# Patient Record
Sex: FEMALE | Race: WHITE | NOT HISPANIC OR LATINO | Employment: STUDENT | URBAN - METROPOLITAN AREA
[De-identification: names, ages, dates, MRNs, and addresses within clinical notes are randomized per-mention and may not be internally consistent; named-entity substitution may affect disease eponyms.]

---

## 2017-11-28 ENCOUNTER — OFFICE VISIT (OUTPATIENT)
Dept: FAMILY MEDICINE CLINIC | Facility: CLINIC | Age: 42
End: 2017-11-28

## 2017-11-28 VITALS
BODY MASS INDEX: 23.99 KG/M2 | WEIGHT: 162 LBS | RESPIRATION RATE: 18 BRPM | HEART RATE: 73 BPM | HEIGHT: 69 IN | OXYGEN SATURATION: 99 % | SYSTOLIC BLOOD PRESSURE: 98 MMHG | DIASTOLIC BLOOD PRESSURE: 64 MMHG

## 2017-11-28 DIAGNOSIS — J06.9 ACUTE URI: Primary | ICD-10-CM

## 2017-11-28 DIAGNOSIS — J01.10 ACUTE NON-RECURRENT FRONTAL SINUSITIS: ICD-10-CM

## 2017-11-28 PROCEDURE — 99213 OFFICE O/P EST LOW 20 MIN: CPT | Performed by: NURSE PRACTITIONER

## 2017-11-28 RX ORDER — AZITHROMYCIN 250 MG/1
TABLET, FILM COATED ORAL
Qty: 6 TABLET | Refills: 0 | Status: SHIPPED | OUTPATIENT
Start: 2017-11-28 | End: 2018-08-28

## 2017-11-28 NOTE — PROGRESS NOTES
"Subjective   Lucille Reynaga is a 41 y.o. female.     Chief Complaint   Patient presents with   • Sinusitis     x 6 days, sinus drainage, head pain, cough, fatigued     Social History   Substance Use Topics   • Smoking status: Never Smoker   • Smokeless tobacco: Not on file   • Alcohol use Not on file       URI    This is a new problem. The current episode started in the past 7 days (6 days). The problem has been unchanged. Associated symptoms include congestion, coughing, ear pain (right), headaches and sinus pain. She has tried antihistamine (neti pot) for the symptoms. The treatment provided mild relief.     Review of Systems   Constitutional: Positive for chills and fatigue. Negative for fever.   HENT: Positive for congestion, ear pain (right) and sinus pain.    Respiratory: Positive for cough.    Neurological: Positive for headaches.   All other systems reviewed and are negative.    Physical Exam   Gen: mildly ill appearing, alert  Ears: Tm's bulging without redness  Nose:  Congestion  Throat:  Red without exudate, some drainage, tonsils okay  Neck: no LAD  Lung: good air movement, regular RR  Heart: RR without murmur  Skin: no rash.    The following portions of the patient's history were reviewed and updated as appropriate: allergies, current medications,past medical hx, family hx, past social history an...    Chief Complaint   Patient presents with   • Sinusitis     x 6 days, sinus drainage, head pain, cough, fatigued       Vitals:    11/28/17 1409   BP: 98/64   BP Location: Left arm   Patient Position: Sitting   Cuff Size: Adult   Pulse: 73   Resp: 18   SpO2: 99%   Weight: 162 lb (73.5 kg)   Height: 69\" (175.3 cm)       Assessment/Plan   Problems Addressed this Visit        Respiratory    Acute URI - Primary    Relevant Medications    azithromycin (ZITHROMAX) 250 MG tablet      Other Visit Diagnoses     Acute non-recurrent frontal sinusitis        Relevant Medications    azithromycin (ZITHROMAX) 250 MG " tablet               Tylenol or Advil as needed for pain, fever, muscle aches  Plenty of fluids  Hand washing discussed  Off work or school note given if needed.  Warm tea for throat.      Cecilia JI  Family Practice  [place], [place]  [facility]

## 2019-11-18 ENCOUNTER — OFFICE VISIT (OUTPATIENT)
Dept: FAMILY MEDICINE CLINIC | Age: 44
End: 2019-11-18
Payer: COMMERCIAL

## 2019-11-18 VITALS
OXYGEN SATURATION: 98 % | SYSTOLIC BLOOD PRESSURE: 110 MMHG | WEIGHT: 200 LBS | HEART RATE: 74 BPM | BODY MASS INDEX: 30.31 KG/M2 | HEIGHT: 68 IN | DIASTOLIC BLOOD PRESSURE: 80 MMHG

## 2019-11-18 DIAGNOSIS — E28.2 PCOS (POLYCYSTIC OVARIAN SYNDROME): Primary | ICD-10-CM

## 2019-11-18 DIAGNOSIS — F31.9 BIPOLAR 1 DISORDER (HCC): ICD-10-CM

## 2019-11-18 DIAGNOSIS — N94.3 PMS (PREMENSTRUAL SYNDROME): ICD-10-CM

## 2019-11-18 DIAGNOSIS — N94.6 DYSMENORRHEA: ICD-10-CM

## 2019-11-18 PROCEDURE — 99205 OFFICE O/P NEW HI 60 MIN: CPT | Performed by: FAMILY MEDICINE

## 2019-11-18 RX ORDER — LITHIUM CARBONATE 600 MG/1
600 CAPSULE ORAL DAILY
COMMUNITY

## 2019-11-18 RX ORDER — METFORMIN HYDROCHLORIDE 500 MG/1
1500 TABLET, EXTENDED RELEASE ORAL
Qty: 90 TABLET | Refills: 5 | Status: SHIPPED | OUTPATIENT
Start: 2019-11-18 | End: 2020-04-06

## 2019-11-18 RX ORDER — ESCITALOPRAM OXALATE 20 MG/1
20 TABLET ORAL DAILY
COMMUNITY

## 2019-11-18 SDOH — HEALTH STABILITY: MENTAL HEALTH: HOW OFTEN DO YOU HAVE A DRINK CONTAINING ALCOHOL?: MONTHLY OR LESS

## 2019-11-18 SDOH — HEALTH STABILITY: MENTAL HEALTH: HOW MANY STANDARD DRINKS CONTAINING ALCOHOL DO YOU HAVE ON A TYPICAL DAY?: 1 OR 2

## 2019-11-18 ASSESSMENT — PATIENT HEALTH QUESTIONNAIRE - PHQ9
1. LITTLE INTEREST OR PLEASURE IN DOING THINGS: 0
SUM OF ALL RESPONSES TO PHQ9 QUESTIONS 1 & 2: 0
SUM OF ALL RESPONSES TO PHQ QUESTIONS 1-9: 0
SUM OF ALL RESPONSES TO PHQ QUESTIONS 1-9: 0
2. FEELING DOWN, DEPRESSED OR HOPELESS: 0

## 2019-11-18 ASSESSMENT — ENCOUNTER SYMPTOMS
EYES NEGATIVE: 1
RESPIRATORY NEGATIVE: 1
ALLERGIC/IMMUNOLOGIC NEGATIVE: 1
GASTROINTESTINAL NEGATIVE: 1

## 2020-04-06 ENCOUNTER — TELEMEDICINE (OUTPATIENT)
Dept: FAMILY MEDICINE CLINIC | Age: 45
End: 2020-04-06
Payer: COMMERCIAL

## 2020-04-06 PROBLEM — E28.2 PCOS (POLYCYSTIC OVARIAN SYNDROME): Status: ACTIVE | Noted: 2020-04-06

## 2020-04-06 PROBLEM — E28.8 OTHER OVARIAN DYSFUNCTION: Status: ACTIVE | Noted: 2020-04-06

## 2020-04-06 PROBLEM — N97.9 FEMALE INFERTILITY: Status: ACTIVE | Noted: 2020-04-06

## 2020-04-06 PROCEDURE — 99214 OFFICE O/P EST MOD 30 MIN: CPT | Performed by: FAMILY MEDICINE

## 2020-04-06 RX ORDER — LETROZOLE 2.5 MG/1
TABLET, FILM COATED ORAL
Qty: 8 TABLET | Refills: 5 | Status: SHIPPED | OUTPATIENT
Start: 2020-04-06 | End: 2020-09-23

## 2020-04-06 NOTE — PROGRESS NOTES
20 mg by mouth daily       No current facility-administered medications for this visit. There is no immunization history on file for this patient. Social History     Tobacco Use    Smoking status: Never Smoker    Smokeless tobacco: Never Used   Substance Use Topics    Alcohol use: Yes     Alcohol/week: 1.0 standard drinks     Types: 1 Glasses of wine per week     Frequency: Monthly or less     Drinks per session: 1 or 2    Drug use: Never        Review of Systems no new problems. Objective:   Physical Exam  Constitutional:       General: She is not in acute distress. Appearance: Normal appearance. She is not ill-appearing. Pulmonary:      Effort: Pulmonary effort is normal.   Skin:     General: Skin is warm. Neurological:      General: No focal deficit present. Mental Status: She is alert and oriented to person, place, and time. Mental status is at baseline. Psychiatric:         Mood and Affect: Mood normal.         Behavior: Behavior normal.         Thought Content: Thought content normal.         Judgment: Judgment normal.         Assessment:      1. PCOS (polycystic ovarian syndrome)  - no direct therapy presently. Cycles under 35 days most of the time. Could add OTC supplements to her efforts to eat healthy and be active, if she wishes. Did not discuss that at length this visit. 2. Other ovarian dysfunction- luteal progesterone defect 2/20  - new diagnosis for her. discussed [employer] treatment for this- luteal Prometrium 200mg peak plus 3-12 each cycle, starting this current cycle. Start femara 20 mg day 2 after neg upt. discussed mechanism of this drug for aiding ovulation  Monitor peak plus 7 progesterone and estradiol levels each cycle, starting NEXT cycle. 3. Female infertility (TTC since 12/18)  - unable to do SFA and HSG now anyway due to [facility] [employer] closures.   Will provide orders for these if not pregnant with medical therapy as above by the time of her next visit after 3 cycles. Plan:      F/u after 3 femara cycles. I spent a total of 25 minutes face to face with the patient and greater than 50% of the time was spent in discussing disease pathophysiology, motivational counseling, answering questions, addressing concerns, reviewing labs and/or other studies with the patient as well as coordinating care.             Osman Ma MD

## 2020-09-23 ENCOUNTER — TELEMEDICINE (OUTPATIENT)
Dept: FAMILY MEDICINE CLINIC | Age: 45
End: 2020-09-23
Payer: COMMERCIAL

## 2020-09-23 PROCEDURE — 99214 OFFICE O/P EST MOD 30 MIN: CPT | Performed by: FAMILY MEDICINE

## 2020-09-23 RX ORDER — LETROZOLE 2.5 MG/1
TABLET, FILM COATED ORAL
Qty: 10 TABLET | Refills: 5 | Status: SHIPPED | OUTPATIENT
Start: 2020-09-23

## 2020-09-23 RX ORDER — METFORMIN HYDROCHLORIDE 500 MG/1
1500 TABLET, EXTENDED RELEASE ORAL
Qty: 90 TABLET | Refills: 5 | Status: SHIPPED | OUTPATIENT
Start: 2020-09-23

## 2020-09-23 NOTE — PROGRESS NOTES
2020    TELEHEALTH EVALUATION -- Audio/Visual (During [facility]-66 public health emergency)    HPI:    Carlie Fleming (:  1975) has requested an audio/video evaluation for the following concern(s):    Chief Complaint   Patient presents with    Other     fertility check      virtual video visit with Tani Hylton and George Maddox for PCOS and luteal dysfunction. Seeking pregnancy since . Last visit . Still charting Spivey method. I do not have a picture of it to review. PCOS: having menstrual cycles regularly, 26-32 days. No meds for this. Adhering to lower carb diet, walking daily. Is seeing functional nutritionist, has eliminated gluten and dairy from diet. Current treatment: Femara 20 mg day 2, progesterone 200 mg peak plus 3-12. Last peak plus 7 done at [address] [place] [address] in [place] have been very good:   Component      Latest Ref Rng & Units 8/10/2020 8/10/2020 2020 2020           2:15 PM  2:15 PM  2:34 PM  2:34 PM   Progesterone      ng/mL  21.7  23.0   Estradiol, Sensitive      pg/mL 156  156        She does feels less dysmenorrhea since starting luteal progesterone therapy. She has been on 500 mg of B6 for mucus, but stopped this at the recommendation of her [address] [place] [address] doctor out of concern for peripheral neuropathy. Mucus has decreased since stopping it. Last cycle only had 3 days of mucus, so that is very limited. She did not feel any symptoms of neuropathy (no numbness, tingling or weakness)    She and her  have not yet completed SFA/HSG. Needs orders for these. She is seeing functional medicine specialist for fatigue, allergies  Seeing neurologist for chronic migraines.     Review of Systems As above     Patient Active Problem List   Diagnosis    Bipolar 1 disorder ([facility] [address] - on Lithium; Dr Derrick Hanna PCOS (polycystic ovarian syndrome)    Other ovarian dysfunction- luteal progesterone defect     Female infertility (TTC since )        Prior to pregnant after 6-9 months   - add B6 500 mg back to her regimen for mucus. Return in about 3 months (around 12/23/2020) for follow up fertility. Rebeca Rausch is a 38 y.o. female being evaluated by a Virtual Visit (video visit) encounter to address concerns as mentioned above. A caregiver was present when appropriate. Due to this being a TeleHealth encounter (During [facility] public health emergency), evaluation of the following organ systems was limited: Vitals/Constitutional/EENT/Resp/CV/GI//MS/Neuro/Skin/Heme-Lymph-Imm. Pursuant to the emergency declaration under the [address] [place] [address], [address] [place] [address] authority and the [employer] and [employer] Act, this Virtual Visit was conducted with patient's (and/or legal guardian's) consent, to reduce the patient's risk of exposure to COVID-19 and provide necessary medical care. The patient (and/or legal guardian) has also been advised to contact this office for worsening conditions or problems, and seek emergency medical treatment and/or call 911 if deemed necessary. Patient identification was verified at the start of the visit: Yes    Total time spent on this encounter: 21 or more minutes were spent on the digital evaluation and management of this patient. Services were provided through a video synchronous discussion virtually to substitute for in-person clinic visit. Patient and provider were located at their individual homes. --Cathey Eisenmenger, MD on 9/23/2020 at 11:35 AM    An electronic signature was used to authenticate this note.

## 2020-11-30 ENCOUNTER — OFFICE VISIT (OUTPATIENT)
Dept: ORTHOPEDIC SURGERY | Facility: CLINIC | Age: 45
End: 2020-11-30

## 2020-11-30 VITALS — HEART RATE: 82 BPM | BODY MASS INDEX: 30.31 KG/M2 | OXYGEN SATURATION: 98 % | HEIGHT: 68 IN | WEIGHT: 199.96 LBS

## 2020-11-30 DIAGNOSIS — M25.572 ACUTE LEFT ANKLE PAIN: Primary | ICD-10-CM

## 2020-11-30 DIAGNOSIS — S82.65XA CLOSED NONDISPLACED FRACTURE OF LATERAL MALLEOLUS OF LEFT FIBULA, INITIAL ENCOUNTER: ICD-10-CM

## 2020-11-30 PROCEDURE — 27786 TREATMENT OF ANKLE FRACTURE: CPT | Performed by: PHYSICIAN ASSISTANT

## 2020-11-30 PROCEDURE — 99214 OFFICE O/P EST MOD 30 MIN: CPT | Performed by: PHYSICIAN ASSISTANT

## 2020-11-30 NOTE — PROGRESS NOTES
[facility] Note    Subjective     Chief Complaint   Patient presents with   • Left Ankle - Pain     Twisted while loading car 11/28/2020   DOI: 11/28/2020    HPI  Lucille Mejía is a 44 y.o. female.  Patient presents for evaluation of her left ankle.  Yesterday when she was loading her car she had a twisting injury that resulted in significant pain and swelling to the lateral aspect of the ankle.  She noted a pop when she had the injury.  She had difficulty with weightbearing.  She was seen in the urgent care yesterday and diagnosed with a distal fibula fracture.  She was placed in a short boot and referred to orthopedics for further evaluation and treatment.    Currently she endorses a pain scale of 1-2/10 at rest and 9-10/10 when weightbearing.  Severity the pain mild to severe depending on activity.  Quality pain stabbing, shooting.  All pain is localized lateral aspect of the ankle.  Pain worse with walking or standing.  Patient denies any numbness or tingling into the extremity.      Patient does report history of significant ankle sprain when younger.    No reported fever, chills, night sweats or other constitutional symptoms.    Past Medical History:   Diagnosis Date   • Bipolar 1 disorder (CMS/HCC)    • Depression       Past Surgical History:   Procedure Laterality Date   • FOOT SURGERY     • LIPOMA EXCISION        History reviewed. No pertinent family history.  Social History     Socioeconomic History   • Marital status: Single     Spouse name: Not on file   • Number of children: Not on file   • Years of education: Not on file   • Highest education level: Not on file   Tobacco Use   • Smoking status: Never Smoker   • Smokeless tobacco: Never Used   Substance and Sexual Activity   • Alcohol use: No   • Drug use: Defer   • Sexual activity: Defer      Current Outpatient Medications on File Prior to Visit   Medication Sig Dispense Refill   • Ascorbic Acid (VITAMIN C) 500 MG capsule  "Take  by mouth.     • Cholecalciferol (VITAMIN D3) 5000 UNIT/ML liquid Take  by mouth.     • escitalopram (LEXAPRO) 20 MG tablet Take  by mouth.     • letrozole (FEMARA) 2.5 MG tablet Take  by mouth Daily.     • lithium carbonate 300 MG capsule Take 300 mg by mouth 3 (Three) Times a Day With Meals.     • Progesterone 100 MG suppository Insert  into the vagina.     • Pyrantel Pamoate 144 (50 Base) MG/ML suspension Take 20 mL by mouth and repeat in two weeks. 40 mL 0     No current facility-administered medications on file prior to visit.       Allergies   Allergen Reactions   • Amoxicillin-Pot Clavulanate    • Dexamethasone    • Sulfa Antibiotics         The following portions of the patient's history were reviewed and updated as appropriate: allergies, current medications, past family history, past medical history, past social history, past surgical history and problem list.    Review of Systems   Constitutional: Negative.    HENT: Negative.    Eyes: Negative.    Respiratory: Negative.    Cardiovascular: Negative.    Gastrointestinal: Negative.    Endocrine: Negative.    Genitourinary: Negative.    Musculoskeletal: Positive for arthralgias.   Skin: Negative.    Allergic/Immunologic: Negative.    Neurological: Negative.    Hematological: Negative.    Psychiatric/Behavioral: Negative.         Objective      Physical Exam  Pulse 82   Ht 171.5 cm (67.52\")   Wt 90.7 kg (199 lb 15.3 oz)   LMP 11/15/2020   SpO2 98%   BMI 30.84 kg/m²     Body mass index is 30.84 kg/m².    GENERAL APPEARANCE: awake, alert & oriented x 3, in no acute distress and well developed, well nourished  PSYCH: normal mood and affect  LUNGS:  breathing nonlabored, no wheezing  EYES: sclera anicteric, pupils equal  CARDIOVASCULAR: palpable pulses. Capillary refill less than 2 seconds  INTEGUMENTARY: skin intact, no clubbing, cyanosis  NEUROLOGIC:  Normal Sensation         Ortho Exam  Peripheral Vascular:  Lower Extremity:  Inspection:  Left--rapid " capillary refill  Palpation:  Dorsalis pedis pulse:  Left--normal    Neurologic  Sensory:    Light Touch:     Left foot:  Dorsal intact and plantar intact    Overall Assessment of Muscle Strength and Tone:  Lower Extremities:       Left:  Tibialis anterior--5/5    Gastroc soleus--5/5    EHL--5/5    FHL--5/5    Musculoskeletal  Lower Extremity  Ankle/Foot:  Inspection and Palpation:     Left:  Tenderness: Distal fibula, lateral ankle.  Negative to medial ankle.    Swelling: Lateral ankle    Effusion:  None    Crepitus:  None     ROM: All ranges of motion causes exacerbation of pain   Left: Plantarflexion--30    Dorsiflexion--10    Inversion--5    Eversion--5    Instability:     Left: Anterior drawer test--negative laxity but positive pain    Squeeze test--negative    Unable to heel or toe walk secondary to pain.      Imaging/Studies  Dr. Banegas and I reviewed plain film imaging performed on 11/29/2020.    EXAMINATION: XR ANKLE 3+ VW LEFT-      INDICATION: twisted left ankle yesterday, swollen and unable to bear  weight.  tender lateral mallelous.      COMPARISON: NONE     FINDINGS: There is cortical irregularity of the distal fibula below the  level of the ankle mortise, with the appearance of fracture. There is  significant overlying soft tissue edema. No prior comparison is  available and fracture fragments appear somewhat sclerotic. Ankle  mortise is symmetric otherwise with an intact talar dome. No additional  acute fracture or dislocation is identified.     IMPRESSION:  Probable acute fracture of the distal fibula below the ankle  mortise with considerable overlying soft tissue edema.     This report was finalized on 11/29/2020 4:31 PM by Sb Storm.    Assessment/Plan        ICD-10-CM ICD-9-CM   1. Acute left ankle pain  M25.572 719.47   2. Closed nondisplaced fracture of lateral malleolus of left fibula, initial encounter  S82.65XA 824.2       No orders of the defined types were placed in this  encounter.       -Left ankle pain due to a nondisplaced fracture distal fibula/lateral malleolus.  -Patient to continue wearing short boot.  -Recommend crutches to assist with ambulation.  Patient reports she has crutches but she is also ordered a knee scooter.  At this time she is weightbearing as tolerated with the boot.  -Discussed the importance of ice and elevation to help with swelling control.  -Recommend Tylenol/acetaminophen and ibuprofen as needed for pain and swelling control.  -Also recommended compression socks to help assist with inflammation/swelling control.  Patient was provided a handout on where she could purchase these.  -Follow-up 4 weeks for repeat evaluation, sooner if issues arise or symptoms worsen/change.  Patient will require repeat imaging of her left ankle at that appointment.  Anticipate being able to transition to [employer] [facility] for lace up ankle brace at that appointment.  Also anticipate possible referral to PT if needed.  -Questions and concerns answered.    History, exam and imaging all discussed Dr. Banegas who agrees with the above assessment and plan.      Medical Decision Making  Management Options : over-the-counter medicine and close treatment of fracture or dislocation  Data/Risk: radiology tests    Nolvia Song PA-C  11/30/20  16:21 EST         EMR Dragon/Transcription disclaimer:  Much of this encounter note is an electronic transcription of spoken language to printed text. Electronic transcription of spoken language may permit erroneous, or at times, nonsensical words or phrases to be inadvertently transcribed. Although I have reviewed the note for such errors, some may still exist.

## 2020-12-10 ENCOUNTER — HOSPITAL ENCOUNTER (EMERGENCY)
Facility: HOSPITAL | Age: 45
Discharge: HOME | End: 2020-12-10
Attending: EMERGENCY MEDICINE | Admitting: EMERGENCY MEDICINE

## 2020-12-10 VITALS
TEMPERATURE: 98.6 F | HEIGHT: 67 IN | OXYGEN SATURATION: 97 % | RESPIRATION RATE: 16 BRPM | DIASTOLIC BLOOD PRESSURE: 77 MMHG | WEIGHT: 200 LBS | HEART RATE: 78 BPM | SYSTOLIC BLOOD PRESSURE: 107 MMHG | BODY MASS INDEX: 31.39 KG/M2

## 2020-12-10 DIAGNOSIS — T78.40XA ALLERGIC REACTION, INITIAL ENCOUNTER: Primary | ICD-10-CM

## 2020-12-10 PROCEDURE — 25010000002 DIPHENHYDRAMINE PER 50 MG: Performed by: EMERGENCY MEDICINE

## 2020-12-10 PROCEDURE — 99284 EMERGENCY DEPT VISIT MOD MDM: CPT

## 2020-12-10 PROCEDURE — 96374 THER/PROPH/DIAG INJ IV PUSH: CPT

## 2020-12-10 PROCEDURE — 99283 EMERGENCY DEPT VISIT LOW MDM: CPT

## 2020-12-10 PROCEDURE — 96375 TX/PRO/DX INJ NEW DRUG ADDON: CPT

## 2020-12-10 PROCEDURE — 63710000001 DIPHENHYDRAMINE PER 50 MG: Performed by: EMERGENCY MEDICINE

## 2020-12-10 RX ORDER — EPINEPHRINE 0.3 MG/.3ML
0.3 INJECTION SUBCUTANEOUS ONCE
Qty: 1 EACH | Refills: 0 | Status: SHIPPED | OUTPATIENT
Start: 2020-12-10 | End: 2020-12-11

## 2020-12-10 RX ORDER — FAMOTIDINE 10 MG/ML
20 INJECTION, SOLUTION INTRAVENOUS ONCE
Status: COMPLETED | OUTPATIENT
Start: 2020-12-10 | End: 2020-12-10

## 2020-12-10 RX ORDER — DIPHENHYDRAMINE HCL 25 MG
25 CAPSULE ORAL ONCE
Status: COMPLETED | OUTPATIENT
Start: 2020-12-10 | End: 2020-12-10

## 2020-12-10 RX ORDER — SODIUM CHLORIDE 0.9 % (FLUSH) 0.9 %
10 SYRINGE (ML) INJECTION AS NEEDED
Status: DISCONTINUED | OUTPATIENT
Start: 2020-12-10 | End: 2020-12-10 | Stop reason: HOSPADM

## 2020-12-10 RX ORDER — DIPHENHYDRAMINE HYDROCHLORIDE 50 MG/ML
25 INJECTION INTRAMUSCULAR; INTRAVENOUS ONCE
Status: COMPLETED | OUTPATIENT
Start: 2020-12-10 | End: 2020-12-10

## 2020-12-10 RX ADMIN — DIPHENHYDRAMINE HYDROCHLORIDE 25 MG: 50 INJECTION INTRAMUSCULAR; INTRAVENOUS at 05:49

## 2020-12-10 RX ADMIN — FAMOTIDINE 20 MG: 10 INJECTION INTRAVENOUS at 05:51

## 2020-12-10 RX ADMIN — DIPHENHYDRAMINE HYDROCHLORIDE 25 MG: 25 CAPSULE ORAL at 06:49

## 2020-12-10 NOTE — ED PROVIDER NOTES
Subjective   44-year-old female with a history of mental illness presents for evaluation of sensation of tongue swelling.  The patient states that she recently ordered prepackaged delivered food off the Internet.  She ate it for the first time last night and states that she enjoyed her beef and broccoli dinner that she made with her freshly prepared ingredients.  She felt well when she went to bed but approximately 2 hours before coming to the emergency department began experiencing a sensation of swelling of her tongue.  She felt that her airway could potentially be compromised and subsequently came to the emergency department to be evaluated.  She denies any wheezing.  No rashes.  She was able to tolerate p.o. at home before coming to the emergency department.  Aside from her new foods, she denies any other new environmental exposures or potential triggers for her symptoms.  She states that her symptoms improved spontaneously prior to coming to the emergency department.          Review of Systems   HENT:        Tongue swelling sensation   All other systems reviewed and are negative.      Past Medical History:   Diagnosis Date   • Bipolar 1 disorder (CMS/HCC)    • Depression        Allergies   Allergen Reactions   • Amoxicillin-Pot Clavulanate    • Dexamethasone    • Sulfa Antibiotics        Past Surgical History:   Procedure Laterality Date   • FOOT SURGERY     • LIPOMA EXCISION         History reviewed. No pertinent family history.    Social History     Socioeconomic History   • Marital status: Single     Spouse name: Not on file   • Number of children: Not on file   • Years of education: Not on file   • Highest education level: Not on file   Tobacco Use   • Smoking status: Never Smoker   • Smokeless tobacco: Never Used   Substance and Sexual Activity   • Alcohol use: No   • Drug use: Defer   • Sexual activity: Defer           Objective   Physical Exam  Vitals signs and nursing note reviewed.   Constitutional:   "     General: She is not in acute distress.     Appearance: She is well-developed. She is not diaphoretic.      Comments: Well-appearing female in no acute distress   HENT:      Head: Normocephalic and atraumatic.      Comments: Oropharynx is clear, no tongue swelling noted, no mucous membrane lesions present, uvula is midline, normal voice  Eyes:      Pupils: Pupils are equal, round, and reactive to light.   Neck:      Musculoskeletal: Normal range of motion and neck supple.   Cardiovascular:      Rate and Rhythm: Normal rate and regular rhythm.      Heart sounds: Normal heart sounds. No murmur. No friction rub. No gallop.    Pulmonary:      Effort: Pulmonary effort is normal. No respiratory distress.      Breath sounds: Normal breath sounds. No wheezing or rales.   Abdominal:      General: Bowel sounds are normal. There is no distension.      Palpations: Abdomen is soft. There is no mass.      Tenderness: There is no abdominal tenderness. There is no guarding or rebound.   Musculoskeletal: Normal range of motion.      Right lower leg: No edema.      Left lower leg: No edema.   Skin:     General: Skin is warm and dry.      Findings: No erythema or rash.   Neurological:      General: No focal deficit present.      Mental Status: She is alert and oriented to person, place, and time.   Psychiatric:         Mood and Affect: Mood normal.         Thought Content: Thought content normal.         Judgment: Judgment normal.         Procedures           ED Course  ED Course as of Dec 10 0839   Thu Dec 10, 2020   0837 44-year-old female with a history of mental illness presents for evaluation of \"tongue swelling sensation\" this morning that she attributes to a potential food allergy.  On arrival to the ED, the patient is very well-appearing.  Vital signs are reassuring.  Her oropharynx is clear with no signs of tongue swelling.  Voice is normal.  No airway compromise noted.  Her lungs are clear.  No rash present.  The patient " was given Benadryl and Pepcid and observed in the emergency department for approximately 3 hours.  She had no airway issues throughout her stay in the emergency department.  I feel that she is appropriate for discharge at this time.  I encouraged her to use Benadryl as directed as needed. Additionally, she was given a prescription for an EpiPen as needed.  I advised her to follow-up with her primary care physician within the next week.  Agreeable with plan and given appropriate strict return precautions.    [DD]      ED Course User Index  [DD] Gary Maradiaga MD                                   No results found for this or any previous visit (from the past 24 hour(s)).  Note: In addition to lab results from this visit, the labs listed above may include labs taken at another facility or during a different encounter within the last 24 hours. Please correlate lab times with ED admission and discharge times for further clarification of the services performed during this visit.    No orders to display     Vitals:    12/10/20 0700 12/10/20 0818 12/10/20 0820 12/10/20 0823   BP: 100/76  107/77    BP Location:       Patient Position:       Pulse: 77 78 78    Resp:    16   Temp:       TempSrc:       SpO2: 98% 97%     Weight:       Height:         Medications   sodium chloride 0.9 % flush 10 mL (has no administration in time range)   diphenhydrAMINE (BENADRYL) injection 25 mg (25 mg Intravenous Given 12/10/20 0549)   famotidine (PEPCID) injection 20 mg (20 mg Intravenous Given 12/10/20 0551)   diphenhydrAMINE (BENADRYL) capsule 25 mg (25 mg Oral Given 12/10/20 0649)     ECG/EMG Results (last 24 hours)     ** No results found for the last 24 hours. **        No orders to display               MDM    Final diagnoses:   Allergic reaction, initial encounter            Gary Maradiaga MD  12/10/20 0876

## 2020-12-10 NOTE — DISCHARGE INSTRUCTIONS
Follow up with one of the [facility] Primary Care Providers below to setup primary care. If you need assistance coordinating a primary care appointment with a [facility] Primary Care Provider, please contact the Primary Care Coordinators at [phone] for appointment scheduling.    [facility], Primary Care   [address] , [address]   [place], [place] [address] [phone]    [facility] Internal Medicine & Endocrinology  [address], [address]  [place] [address] [phone]    [facility] Family Medicine  [address], [address]   [place] [address] [phone]    [facility] Primary Care  [address], [address]  [place], [place] [address] [phone]    [facility], Primary Care,   [address], [address]   [place], [place] [address] [phone]    [facility] Primary Care  [address]  [place], [place] [id number] [phone]    [facility], Primary Care  [address], [address]  [place], [place] [address] [phone]    [facility] Internal Medicine & Pediatrics  [address], [address]   [place] [address] [phone]    [facility], Primary Care  [address] [address] [phone]      [facility] Primary Care  [address], [address]   [place] [address] [phone]    [facility] Family Medicine  [address] [place] [address] [phone]

## 2020-12-11 ENCOUNTER — HOSPITAL ENCOUNTER (EMERGENCY)
Facility: HOSPITAL | Age: 45
Discharge: HOME | End: 2020-12-11
Attending: EMERGENCY MEDICINE | Admitting: EMERGENCY MEDICINE

## 2020-12-11 ENCOUNTER — APPOINTMENT (OUTPATIENT)
Dept: CT IMAGING | Facility: HOSPITAL | Age: 45
End: 2020-12-11

## 2020-12-11 VITALS
HEIGHT: 68 IN | TEMPERATURE: 98.3 F | DIASTOLIC BLOOD PRESSURE: 74 MMHG | BODY MASS INDEX: 30.31 KG/M2 | OXYGEN SATURATION: 96 % | HEART RATE: 80 BPM | SYSTOLIC BLOOD PRESSURE: 118 MMHG | WEIGHT: 200 LBS | RESPIRATION RATE: 18 BRPM

## 2020-12-11 DIAGNOSIS — R09.89 GLOBUS PHARYNGEUS: Primary | ICD-10-CM

## 2020-12-11 LAB
ALBUMIN SERPL-MCNC: 4.2 G/DL (ref 3.5–5.2)
ALBUMIN/GLOB SERPL: 1.1 G/DL
ALP SERPL-CCNC: 82 U/L (ref 39–117)
ALT SERPL W P-5'-P-CCNC: 19 U/L (ref 1–33)
ANION GAP SERPL CALCULATED.3IONS-SCNC: 10 MMOL/L (ref 5–15)
AST SERPL-CCNC: 18 U/L (ref 1–32)
BASOPHILS # BLD AUTO: 0.03 10*3/MM3 (ref 0–0.2)
BASOPHILS NFR BLD AUTO: 0.3 % (ref 0–1.5)
BILIRUB SERPL-MCNC: 0.3 MG/DL (ref 0–1.2)
BUN SERPL-MCNC: 9 MG/DL (ref 6–20)
BUN/CREAT SERPL: 15.3 (ref 7–25)
CALCIUM SPEC-SCNC: 10 MG/DL (ref 8.6–10.5)
CHLORIDE SERPL-SCNC: 106 MMOL/L (ref 98–107)
CO2 SERPL-SCNC: 24 MMOL/L (ref 22–29)
CREAT SERPL-MCNC: 0.59 MG/DL (ref 0.57–1)
DEPRECATED RDW RBC AUTO: 41.7 FL (ref 37–54)
EOSINOPHIL # BLD AUTO: 0.44 10*3/MM3 (ref 0–0.4)
EOSINOPHIL NFR BLD AUTO: 4.1 % (ref 0.3–6.2)
ERYTHROCYTE [DISTWIDTH] IN BLOOD BY AUTOMATED COUNT: 12.7 % (ref 12.3–15.4)
GFR SERPL CREATININE-BSD FRML MDRD: 111 ML/MIN/1.73
GLOBULIN UR ELPH-MCNC: 3.8 GM/DL
GLUCOSE SERPL-MCNC: 81 MG/DL (ref 65–99)
HCT VFR BLD AUTO: 45.6 % (ref 34–46.6)
HGB BLD-MCNC: 14.7 G/DL (ref 12–15.9)
IMM GRANULOCYTES # BLD AUTO: 0.05 10*3/MM3 (ref 0–0.05)
IMM GRANULOCYTES NFR BLD AUTO: 0.5 % (ref 0–0.5)
LYMPHOCYTES # BLD AUTO: 2.02 10*3/MM3 (ref 0.7–3.1)
LYMPHOCYTES NFR BLD AUTO: 18.6 % (ref 19.6–45.3)
MCH RBC QN AUTO: 29 PG (ref 26.6–33)
MCHC RBC AUTO-ENTMCNC: 32.2 G/DL (ref 31.5–35.7)
MCV RBC AUTO: 89.9 FL (ref 79–97)
MONOCYTES # BLD AUTO: 0.69 10*3/MM3 (ref 0.1–0.9)
MONOCYTES NFR BLD AUTO: 6.4 % (ref 5–12)
NEUTROPHILS NFR BLD AUTO: 7.63 10*3/MM3 (ref 1.7–7)
NEUTROPHILS NFR BLD AUTO: 70.1 % (ref 42.7–76)
NRBC BLD AUTO-RTO: 0 /100 WBC (ref 0–0.2)
PLATELET # BLD AUTO: 274 10*3/MM3 (ref 140–450)
PMV BLD AUTO: 10.2 FL (ref 6–12)
POTASSIUM SERPL-SCNC: 4.3 MMOL/L (ref 3.5–5.2)
PROT SERPL-MCNC: 8 G/DL (ref 6–8.5)
RBC # BLD AUTO: 5.07 10*6/MM3 (ref 3.77–5.28)
SODIUM SERPL-SCNC: 140 MMOL/L (ref 136–145)
WBC # BLD AUTO: 10.86 10*3/MM3 (ref 3.4–10.8)

## 2020-12-11 PROCEDURE — 85025 COMPLETE CBC W/AUTO DIFF WBC: CPT | Performed by: NURSE PRACTITIONER

## 2020-12-11 PROCEDURE — 80053 COMPREHEN METABOLIC PANEL: CPT | Performed by: NURSE PRACTITIONER

## 2020-12-11 PROCEDURE — 25010000002 IOPAMIDOL 61 % SOLUTION: Performed by: EMERGENCY MEDICINE

## 2020-12-11 PROCEDURE — 96375 TX/PRO/DX INJ NEW DRUG ADDON: CPT

## 2020-12-11 PROCEDURE — 25010000002 DIPHENHYDRAMINE PER 50 MG: Performed by: NURSE PRACTITIONER

## 2020-12-11 PROCEDURE — 99284 EMERGENCY DEPT VISIT MOD MDM: CPT

## 2020-12-11 PROCEDURE — 70491 CT SOFT TISSUE NECK W/DYE: CPT

## 2020-12-11 PROCEDURE — 96374 THER/PROPH/DIAG INJ IV PUSH: CPT

## 2020-12-11 RX ORDER — DIPHENHYDRAMINE HYDROCHLORIDE 50 MG/ML
25 INJECTION INTRAMUSCULAR; INTRAVENOUS ONCE
Status: COMPLETED | OUTPATIENT
Start: 2020-12-11 | End: 2020-12-11

## 2020-12-11 RX ORDER — FAMOTIDINE 10 MG/ML
20 INJECTION, SOLUTION INTRAVENOUS ONCE
Status: COMPLETED | OUTPATIENT
Start: 2020-12-11 | End: 2020-12-11

## 2020-12-11 RX ORDER — SODIUM CHLORIDE 0.9 % (FLUSH) 0.9 %
10 SYRINGE (ML) INJECTION AS NEEDED
Status: DISCONTINUED | OUTPATIENT
Start: 2020-12-11 | End: 2020-12-11 | Stop reason: HOSPADM

## 2020-12-11 RX ADMIN — SODIUM CHLORIDE 500 ML: 9 INJECTION, SOLUTION INTRAVENOUS at 20:42

## 2020-12-11 RX ADMIN — DIPHENHYDRAMINE HYDROCHLORIDE 25 MG: 50 INJECTION INTRAMUSCULAR; INTRAVENOUS at 19:32

## 2020-12-11 RX ADMIN — IOPAMIDOL 75 ML: 612 INJECTION, SOLUTION INTRAVENOUS at 20:24

## 2020-12-11 RX ADMIN — FAMOTIDINE 20 MG: 10 INJECTION INTRAVENOUS at 19:32

## 2020-12-12 NOTE — DISCHARGE INSTRUCTIONS
Continue to avoid any high histamine foods of concern.  Continue Benadryl 25 mg 4 times a day for the next 3 days as well as Pepcid.  Follow-up with your primary care provider to monitor your recovery.  Thank you and Ingrid Pitts    Follow up with one of the [facility] Primary Care Providers below to setup primary care. If you need assistance coordinating a primary care appointment with a [facility] Primary Care Provider, please contact the Primary Care Coordinators at [phone] for appointment scheduling.    [facility], Primary Care   [address], [address]   [place] [address]  [phone]    [facility] Internal Medicine & Endocrinology  [address], [address]  [place] [address] [phone]    [facility] Family Medicine  [address], [address]   [place] [address] [phone]    [facility] Primary Care  [address], [address]  [place], [place] [id number] [phone]    [facility], Primary Care,   [address], [address]   [place], [place] [address] [phone]    [facility] Primary Care  [address], [address]  [place], [place] [id number] [phone]    [facility], Primary Care  [address], [address]  [place], [place] [address] [phone]    [facility] Internal Medicine & Pediatrics  [address], [address]   [place] [address] [phone]    [facility], Primary Care  [address], [place] [address] [phone]      [facility] Primary Care  [address], [address]   [place] [address] [phone]    [facility] Family Medicine  [address] [place] [address] [address]. [place] [id number] [phone]  Follow up with one of the physician centers below to setup primary  care.    [facility], [phone], [address], [address], [place], [facility]    [facility], [phone], [address], [address] [place] [address], [phone], [address] [place] [address];     [facility], [phone], [address] [place] [address]

## 2020-12-12 NOTE — ED PROVIDER NOTES
"Subjective   Patient presents to the emergency department with sensation of \"a golf ball in the back of my throat\".  Patient states that she was seen in the emergency department yesterday brought by EMS for suspected allergic reaction.  Patient states that she has a longstanding \"histamine problem\".  Which she describes as a \"histamine intolerance\" for which she is seen by functional medicine providers out of [facility].  \"Basically it is like I have a constant low level allergic reaction\".  Patient states that she consumed a new food the night before last.  Patient states that she did some Internet reading and that this substance, called TAMARI, \"has 10 times amount of histamine as other foods\".  She states that when she was seen in the ER yesterday, she responded very well to Pepcid and Benadryl.  However, \"last night I woke up gasping for air and was relieved only by sitting upright and putting my travel pillow around my neck\".  Patient states she took 20 mL of liquid Benadryl and she thinks that the liquid Benadryl \"mixed with the phlegm in my throat and made it difficult to swallow\".  Patient believes that she continues to have allergic reaction with the sensation of foreign body and swelling in her tongue and throat.      History provided by:  Patient   used: No    Allergic Reaction  Presenting symptoms: difficulty swallowing    Presenting symptoms: no swelling and no wheezing    Severity:  Mild  Duration:  2 days  Prior allergic episodes:  Food/nut allergies  Context: not animal exposure, not chemicals, not dairy/milk products and not eggs    Relieved by: Benadryl and Pepcid yesterday.  Worsened by:  Nothing      Review of Systems   HENT: Positive for trouble swallowing.    Respiratory: Positive for shortness of breath. Negative for cough, choking, wheezing and stridor.    Cardiovascular: Negative for chest pain and leg swelling.   All other systems reviewed and are " negative.      Past Medical History:   Diagnosis Date   • Bipolar 1 disorder (CMS/HCC)    • Depression        Allergies   Allergen Reactions   • Amoxicillin-Pot Clavulanate    • Dexamethasone    • Sulfa Antibiotics        Past Surgical History:   Procedure Laterality Date   • FOOT SURGERY     • LIPOMA EXCISION         History reviewed. No pertinent family history.    Social History     Socioeconomic History   • Marital status: Single     Spouse name: Not on file   • Number of children: Not on file   • Years of education: Not on file   • Highest education level: Not on file   Tobacco Use   • Smoking status: Never Smoker   • Smokeless tobacco: Never Used   Substance and Sexual Activity   • Alcohol use: No   • Drug use: Defer   • Sexual activity: Defer           Objective   Physical Exam  Vitals signs and nursing note reviewed.   Constitutional:       General: She is not in acute distress.     Appearance: Normal appearance. She is well-developed. She is not ill-appearing.      Comments: Patient is a verbose and detailed historian.  Her vital signs are normal.  She is oxygenating very well requiring no airway support.   HENT:      Head: Normocephalic.      Right Ear: External ear normal.      Left Ear: External ear normal.      Nose: Nose normal.      Mouth/Throat:      Mouth: Mucous membranes are moist.      Pharynx: No oropharyngeal exudate.   Eyes:      Conjunctiva/sclera: Conjunctivae normal.   Neck:      Musculoskeletal: Normal range of motion and neck supple. No muscular tenderness.   Cardiovascular:      Rate and Rhythm: Normal rate and regular rhythm.      Heart sounds: No murmur.   Pulmonary:      Effort: Pulmonary effort is normal. No tachypnea, accessory muscle usage or respiratory distress.      Breath sounds: Normal breath sounds. No stridor.   Abdominal:      General: Bowel sounds are normal. There is no distension.      Palpations: Abdomen is soft.      Tenderness: There is no abdominal tenderness.    Musculoskeletal: Normal range of motion.         General: No swelling, tenderness or deformity.   Skin:     General: Skin is warm and dry.      Capillary Refill: Capillary refill takes less than 2 seconds.      Coloration: Skin is not pale.      Findings: No lesion.   Neurological:      General: No focal deficit present.      Mental Status: She is alert and oriented to person, place, and time.      Comments: No Neurosensory deficit or focal weakness     Psychiatric:         Mood and Affect: Mood is anxious.         Behavior: Behavior normal.         Thought Content: Thought content normal.         Procedures           ED Course  ED Course as of Dec 11 2102   Fri Dec 11, 2020   1800 Perc and wells score negative.       [MS]   2050 Patient's work-up is reassuring.  I had a discussion with her about parameters for concern that would warrant return to the emergency department.  Patient understands and concurs with outpatient plan of care while maintaining Pepcid and Benadryl for the next several days.    [MS]      ED Course User Index  [MS] RoxyTri, MARGY            Recent Results (from the past 24 hour(s))   Comprehensive Metabolic Panel    Collection Time: 12/11/20  7:22 PM    Specimen: Blood   Result Value Ref Range    Glucose 81 65 - 99 mg/dL    BUN 9 6 - 20 mg/dL    Creatinine 0.59 0.57 - 1.00 mg/dL    Sodium 140 136 - 145 mmol/L    Potassium 4.3 3.5 - 5.2 mmol/L    Chloride 106 98 - 107 mmol/L    CO2 24.0 22.0 - 29.0 mmol/L    Calcium 10.0 8.6 - 10.5 mg/dL    Total Protein 8.0 6.0 - 8.5 g/dL    Albumin 4.20 3.50 - 5.20 g/dL    ALT (SGPT) 19 1 - 33 U/L    AST (SGOT) 18 1 - 32 U/L    Alkaline Phosphatase 82 39 - 117 U/L    Total Bilirubin 0.3 0.0 - 1.2 mg/dL    eGFR Non African Amer 111 >60 mL/min/1.73    Globulin 3.8 gm/dL    A/G Ratio 1.1 g/dL    BUN/Creatinine Ratio 15.3 7.0 - 25.0    Anion Gap 10.0 5.0 - 15.0 mmol/L   CBC Auto Differential    Collection Time: 12/11/20  7:22 PM    Specimen: Blood    Result Value Ref Range    WBC 10.86 (H) 3.40 - 10.80 10*3/mm3    RBC 5.07 3.77 - 5.28 10*6/mm3    Hemoglobin 14.7 12.0 - 15.9 g/dL    Hematocrit 45.6 34.0 - 46.6 %    MCV 89.9 79.0 - 97.0 fL    MCH 29.0 26.6 - 33.0 pg    MCHC 32.2 31.5 - 35.7 g/dL    RDW 12.7 12.3 - 15.4 %    RDW-SD 41.7 37.0 - 54.0 fl    MPV 10.2 6.0 - 12.0 fL    Platelets 274 140 - 450 10*3/mm3    Neutrophil % 70.1 42.7 - 76.0 %    Lymphocyte % 18.6 (L) 19.6 - 45.3 %    Monocyte % 6.4 5.0 - 12.0 %    Eosinophil % 4.1 0.3 - 6.2 %    Basophil % 0.3 0.0 - 1.5 %    Immature Grans % 0.5 0.0 - 0.5 %    Neutrophils, Absolute 7.63 (H) 1.70 - 7.00 10*3/mm3    Lymphocytes, Absolute 2.02 0.70 - 3.10 10*3/mm3    Monocytes, Absolute 0.69 0.10 - 0.90 10*3/mm3    Eosinophils, Absolute 0.44 (H) 0.00 - 0.40 10*3/mm3    Basophils, Absolute 0.03 0.00 - 0.20 10*3/mm3    Immature Grans, Absolute 0.05 0.00 - 0.05 10*3/mm3    nRBC 0.0 0.0 - 0.2 /100 WBC     Note: In addition to lab results from this visit, the labs listed above may include labs taken at another facility or during a different encounter within the last 24 hours. Please correlate lab times with ED admission and discharge times for further clarification of the services performed during this visit.    CT Soft Tissue Neck With Contrast   Final Result   Normal CT of the neck soft tissue.               Signer Name: Vinh Song MD    Signed: 12/11/2020 8:39 PM    Workstation Name: [id number]     [facility]        Vitals:    12/11/20 1849 12/11/20 2000 12/11/20 2011 12/11/20 2046   BP:  110/72  118/74   BP Location:    Left arm   Patient Position:    Lying   Pulse: 85 82  80   Resp:       Temp:       TempSrc:       SpO2: 98% 99% 98% 96%   Weight:       Height:         Medications   sodium chloride 0.9 % flush 10 mL (has no administration in time range)   sodium chloride 0.9 % bolus 500 mL (500 mL Intravenous New Bag 12/11/20 2042)   diphenhydrAMINE (BENADRYL) injection 25 mg (25 mg  Intravenous Given 12/11/20 1932)   famotidine (PEPCID) injection 20 mg (20 mg Intravenous Given 12/11/20 1932)   iopamidol (ISOVUE-300) 61 % injection 100 mL (75 mL Intravenous Given 12/11/20 2024)     ECG/EMG Results (last 24 hours)     ** No results found for the last 24 hours. **        No orders to display                                         MDM    Final diagnoses:   Globus pharyngeus            Tri Ramsey APRN  12/11/20 2101       Tri Ramsey APRN  12/11/20 2102

## 2020-12-28 ENCOUNTER — OFFICE VISIT (OUTPATIENT)
Dept: ORTHOPEDIC SURGERY | Facility: CLINIC | Age: 45
End: 2020-12-28

## 2020-12-28 VITALS — OXYGEN SATURATION: 98 % | HEIGHT: 68 IN | WEIGHT: 199.96 LBS | HEART RATE: 120 BPM | BODY MASS INDEX: 30.31 KG/M2

## 2020-12-28 DIAGNOSIS — S82.65XD CLOSED NONDISPLACED FRACTURE OF LATERAL MALLEOLUS OF LEFT FIBULA WITH ROUTINE HEALING, SUBSEQUENT ENCOUNTER: Primary | ICD-10-CM

## 2020-12-28 PROCEDURE — 99024 POSTOP FOLLOW-UP VISIT: CPT | Performed by: PHYSICIAN ASSISTANT

## 2020-12-28 NOTE — PROGRESS NOTES
"    [facility] [place] Orthopa[facility] Surgery Clinic Note        Subjective     CC: Follow-up (4 weeks follow up Closed nondisplaced fracture of lateral malleolus of left fibula DOI: 11/28/2020)      ADELA Mejía is a 45 y.o. female.  Patient returns for follow-up evaluation of her left ankle.  She is when wearing the boot as directed.  She notes improvement of symptoms.    Currently she endorses a pain scale of 1-2/10.  No reported numbness or tingling into the extremity.  She still notes discomfort/pain with walking, prolonged standing and trying to climb stairs.    Overall, patient's symptoms are improved.    ROS:    Constiutional:Pt denies fever, chills, nausea, or vomiting.  MSK:as above        Objective      Past Medical History  Past Medical History:   Diagnosis Date   • Bipolar 1 disorder (CMS/HCC)    • Depression    • Personal history of other medical treatment 12/2020    pt states she has \"histamine intolerance\"         Physical Exam  Pulse 120   Ht 171.5 cm (67.52\")   Wt 90.7 kg (199 lb 15.3 oz)   SpO2 98%   BMI 30.84 kg/m²     Body mass index is 30.84 kg/m².    Patient is well nourished and well developed.        Ortho Exam  Left ankle  Skin: Grossly intact without any redness, warmth or swelling.  Tenderness: Very minimal discomfort noted distal tip of fibula.  No tenderness noted medially.  Motion: Dorsiflexion 10 degrees, plantarflexion 40 degrees.  Motor/sensory: Grossly intact C5-T1.      Imaging/Labs/EMG Reviewed:  Ordered left ankle plain films.  Imaging read by Dr. Hdz.    Imaging Results (Last 24 Hours)     Procedure Component Value Units Date/Time    XR Ankle 3+ View Left [[id number]] Resulted: 12/28/20 1358     Updated: 12/28/20 1359    Narrative:      Left Ankle Radiographs  Indication: left ankle pain  Views: AP, mortise and lateral of the left ankle    Comparison: 11/29/2020    Findings:   Small avulsion fracture at the tip of the lateral malleolus, with old   appearing calcification " adjacent to this at the tip of the lateral   malleolus, with no significant change compared to the previous films, with   old appearing changes in the medial malleolus.          Assessment:  1. Closed nondisplaced fracture of lateral malleolus of left fibula with routine healing, subsequent encounter        Plan:  1. Nondisplaced fracture distal fibula/lateral malleolus, stable and healing.  2. Patient was transitioned from a short boot to an Aircast stirrup today.  3. May continue with weightbearing as tolerated.  4. No formal PT as her symptoms are improving.  If she continues to note weakness/pain/discomfort asked her next visit then will consider formal PT referral at that time.  5. Patient is wearing compression socks, recommend she continue with these.  6. Follow-up 4 weeks for repeat evaluation to include preclinic imaging of her left ankle.  7. Questions and concerns answered.      Nolvia Song PA-C  12/28/20  14:18 CHRIS Healy disclaimer:  Much of this encounter note is an electronic transcription/translation of spoken language to printed text. The electronic translation of spoken language may permit erroneous, or at times, nonsensical words or phrases to be inadvertently transcribed; Although I have reviewed the note for such errors, some may still exist.

## 2021-01-25 ENCOUNTER — OFFICE VISIT (OUTPATIENT)
Dept: ORTHOPEDIC SURGERY | Facility: CLINIC | Age: 46
End: 2021-01-25

## 2021-01-25 VITALS — BODY MASS INDEX: 29.1 KG/M2 | HEIGHT: 68 IN | WEIGHT: 192 LBS | HEART RATE: 105 BPM | OXYGEN SATURATION: 95 %

## 2021-01-25 DIAGNOSIS — S82.65XD CLOSED NONDISPLACED FRACTURE OF LATERAL MALLEOLUS OF LEFT FIBULA WITH ROUTINE HEALING, SUBSEQUENT ENCOUNTER: ICD-10-CM

## 2021-01-25 DIAGNOSIS — Z09 FRACTURE FOLLOW-UP: Primary | ICD-10-CM

## 2021-01-25 PROCEDURE — 99024 POSTOP FOLLOW-UP VISIT: CPT | Performed by: PHYSICIAN ASSISTANT

## 2021-01-25 NOTE — PROGRESS NOTES
"    [facility] [place] [place] Orthopaedic Surgery Clinic Note        Subjective     Follow-up (Closed nondisplaced fracture of lateral malleolus of left fibula with routine healing)  DOI: 11/28/2020    ADELA Mejía is a 45 y.o. female.  Patient returns for follow-up evaluation of her left ankle.  She notes significant improvement of symptoms.  She is gone from wearing the Aircast stirrup to a neoprene brace to the ankle.    She endorses a pain scale maximum of 1/10.  No numbness or tingling into the extremity.  No mechanical symptoms to the ankle.  She still has some stiffness.  Symptoms are worse with walking and standing but they have improved.    Overall, patient's symptoms are improved.    No reported fever, chills, night sweats or other constitutional symptoms.        Objective      Physical Exam  Pulse 105   Ht 171.5 cm (67.52\")   Wt 87.1 kg (192 lb)   SpO2 95%   BMI 29.61 kg/m²     Body mass index is 29.61 kg/m².        Ortho Exam  Left ankle  Skin: Grossly intact without redness, warmth or swelling.  Tenderness: No tenderness noted to the tip of the fibula.  She does have some mild discomfort noted to ATFL.  Motion: 10 degrees dorsiflexion to 40 degrees plantarflexion.  Strength: 4+/5 peroneals, 5/5 anterior tib, gastroc, EHL, FHL.  Motor/sensory: Grossly intact C5-T1.      Imaging/Studies  Ordered left ankle plain films.  Imaging read by Dr. Banegas.    Imaging Results (Last 24 Hours)     Procedure Component Value Units Date/Time    XR Ankle 3+ View Left [[id number]] Resulted: 01/25/21 1546     Updated: 01/25/21 1547    Narrative:      Left Ankle X-Ray  Indication: Pain  Views: AP, Lateral, Mortise    Findings:   Healing distal fibula avulsion fracture  No bony lesion  Soft tissues normal  Normal joint spaces with mortise well-aligned, no evidence of syndesmosis   widening     prior studies available for comparison.            Assessment:  1. Fracture follow-up    2. Closed nondisplaced fracture of lateral " malleolus of left fibula with routine healing, subsequent encounter        Plan:  1. Nondisplaced fracture left distal fibula, stable with continued healing noted.  2. Patient may continue with her neoprene brace for stability and support.  3. I offered to send the patient to formal PT she politely declined.  I did show her the [employer] website with ankle rehab stretching and strengthening exercises.  If she changes her mind regarding formal PT she will contact the clinic.  4. Recommend continue use of compression socks.  5. Recommend over-the-counter pain medication as needed.  6. Follow-up 6 weeks for repeat evaluation, sooner if issues arise or symptoms worsen/change.  Please do imaging of the left ankle.  7. Questions and concerns answered.      Nolvia Song PA-C  01/26/21  09:19 EST      Dragon disclaimer:  Much of this encounter note is an electronic transcription/translation of spoken language to printed text. The electronic translation of spoken language may permit erroneous, or at times, nonsensical words or phrases to be inadvertently transcribed; Although I have reviewed the note for such errors, some may still exist.

## 2021-02-03 ENCOUNTER — OFFICE VISIT (OUTPATIENT)
Dept: INTERNAL MEDICINE | Facility: CLINIC | Age: 46
End: 2021-02-03

## 2021-02-03 VITALS
BODY MASS INDEX: 30.92 KG/M2 | OXYGEN SATURATION: 97 % | TEMPERATURE: 97 F | DIASTOLIC BLOOD PRESSURE: 74 MMHG | SYSTOLIC BLOOD PRESSURE: 112 MMHG | RESPIRATION RATE: 19 BRPM | HEIGHT: 67 IN | HEART RATE: 91 BPM | WEIGHT: 197 LBS

## 2021-02-03 DIAGNOSIS — G43.009 MIGRAINE WITHOUT AURA AND WITHOUT STATUS MIGRAINOSUS, NOT INTRACTABLE: ICD-10-CM

## 2021-02-03 DIAGNOSIS — R53.83 OTHER FATIGUE: ICD-10-CM

## 2021-02-03 DIAGNOSIS — F31.9 BIPOLAR 1 DISORDER (HCC): Primary | ICD-10-CM

## 2021-02-03 DIAGNOSIS — Z91.018 FOOD ALLERGY: ICD-10-CM

## 2021-02-03 PROBLEM — E28.2 PCOS (POLYCYSTIC OVARIAN SYNDROME): Status: ACTIVE | Noted: 2020-04-06

## 2021-02-03 PROBLEM — E28.8 OTHER OVARIAN DYSFUNCTION: Status: ACTIVE | Noted: 2020-04-06

## 2021-02-03 PROCEDURE — 99213 OFFICE O/P EST LOW 20 MIN: CPT | Performed by: PHYSICIAN ASSISTANT

## 2021-02-03 RX ORDER — LITHIUM CARBONATE 300 MG/1
2 TABLET, FILM COATED, EXTENDED RELEASE ORAL DAILY
COMMUNITY
Start: 2021-01-14 | End: 2021-02-03 | Stop reason: SDUPTHER

## 2021-02-03 RX ORDER — SACCHAROMYCES BOULARDII 250 MG
250 CAPSULE ORAL 2 TIMES DAILY
COMMUNITY
End: 2022-09-30

## 2021-02-03 RX ORDER — DIPHENHYDRAMINE HCL 25 MG
25 TABLET ORAL EVERY 6 HOURS PRN
COMMUNITY

## 2021-02-03 NOTE — PROGRESS NOTES
"Chief Complaint   Patient presents with   • Establish Care     Previous PCP    • Immunizations     Discuss COVID        Subjective       History of Present Illness     Lucille Mejía is a 45 y.o. female. She presents as a new patient to establish care. Current concerns include fatigue, and possible histamine intolerance.     1) Fatigue, chronic. She has seen [facility] Dr. Mariola Casarez MD in functional medicine via telehealth as well as nutritionist. They are working on a possible diagnosis of histamine intolerance. Pt is allergic to ragweed, but has not had food allergy testing. She reports that she has had a reaction to tamari, an asian spice and was seen at [facility] ED on 12/10/2021 and again 12/11/2021 when she had an anaphylactic reaction and couldn't breathe and had swelling of her tongue. She had an unremarkable CMP, CBC with tiny elevation in WBC at 10.86 which is almost negligible. She feels she also has reactions to other foods but no anaphylaxis or rash. She feels that her sx may be related to \"histamine intolerance\" or \"mast cell activation syndrome.\" She does take benadryl and pepcid which help with food reactions. She will continue to follow with [facility] functional medicine on these issues.     2) Chronic migraines, has about 1 migraine per week. She uses TENs unit specifically for migraines, and also takes excedrin migraine which helps. Had acupuncture for this a few times which helped initially but no longer providing relief.     3) Bipolar disorder, managed by Dr. Ledesma in psychiatry out of [place], [place] and currently well-controlled on Lexapro and lithium.       Are you currently seeing any other doctors or specialists? Nolvia Song-- orthopedics; Dr. Ledesma-- psychiatry ([place]); Dr. Mariola Casarez-- functional medicine at [facility]   Are you currently taking any OTC medications or herbal medications? Only as noted in med list     Sleep: 8-10 hours/ night "   Diet: very healthy, per pt  Exercise: walking 2 days/ week     Most recent colonoscopy: n/a, never had  Most recent mammogram: 2017, normal per pt   Most recent pap smear: ?5 years, normal per pt  First day of last menses: regular, consistent, LMP 1/20/2021    Regular dental visits: Yes, UTD  Regular eye exams: Yes, UTD, wears reading glasses only       The following portions of the patient's history were reviewed and updated as appropriate: allergies, current medications, past family history, past medical history, past social history, past surgical history and problem list.    Allergies   Allergen Reactions   • Amoxicillin-Pot Clavulanate GI Intolerance   • Dexamethasone Mental Status Change     Manic episodes   • Sulfa Antibiotics Hives     Social History     Tobacco Use   • Smoking status: Never Smoker   • Smokeless tobacco: Never Used   Substance Use Topics   • Alcohol use: No     Past Surgical History:   Procedure Laterality Date   • FOOT SURGERY Right     Love's neuroma   • LIPOMA EXCISION       History reviewed. No pertinent family history.      Current Outpatient Medications:   •  diphenhydrAMINE (BENADRYL) 25 MG tablet, Take 25 mg by mouth Every 6 (Six) Hours As Needed for Itching., Disp: , Rfl:   •  escitalopram (LEXAPRO) 20 MG tablet, Take  by mouth., Disp: , Rfl:   •  lithium carbonate 300 MG capsule, Take 600 mg by mouth Daily., Disp: , Rfl:   •  saccharomyces boulardii (FLORASTOR) 250 MG capsule, Take 250 mg by mouth 2 (Two) Times a Day. PRN, Disp: , Rfl:   •  Ascorbic Acid (VITAMIN C) 500 MG capsule, Take  by mouth., Disp: , Rfl:   •  Cholecalciferol (VITAMIN D3) 5000 UNIT/ML liquid, Take  by mouth., Disp: , Rfl:   •  EPINEPHRINE HCL IJ, Inject  as directed., Disp: , Rfl:   •  letrozole (FEMARA) 2.5 MG tablet, Take  by mouth Every 30 (Thirty) Days. Pt states on 2nd day of her period is when she takes this medication, Disp: , Rfl:   •  Progesterone 100 MG suppository, Insert  into the vagina.,  Disp: , Rfl:     Patient Active Problem List   Diagnosis   • Fatigue   • Allergic rhinitis   • Bipolar 1 disorder (CMS/HCC)   • Other ovarian dysfunction       Review of Systems   Constitutional: Positive for fatigue. Negative for chills and fever.   HENT: Negative for congestion, ear pain, sore throat and trouble swallowing.    Eyes: Negative for pain and visual disturbance.   Respiratory: Negative for cough, shortness of breath and wheezing.    Cardiovascular: Negative for chest pain and palpitations.   Gastrointestinal: Negative for abdominal pain, diarrhea, nausea and vomiting.   Genitourinary: Negative for dysuria and hematuria.   Musculoskeletal: Negative for back pain.   Skin: Negative for rash.   Allergic/Immunologic: Positive for environmental allergies and food allergies. Negative for immunocompromised state.   Neurological: Positive for headache. Negative for dizziness, syncope and weakness.   Psychiatric/Behavioral: Positive for depressed mood (well-controlled). Negative for sleep disturbance. The patient is not nervous/anxious.        Objective   Vitals:    02/03/21 1447   BP: 112/74   Pulse: 91   Resp: 19   Temp: 97 °F (36.1 °C)   SpO2: 97%     Physical Exam  Constitutional:       Appearance: Normal appearance. She is well-developed.   HENT:      Head: Normocephalic and atraumatic.      Right Ear: Tympanic membrane, ear canal and external ear normal.      Left Ear: Tympanic membrane, ear canal and external ear normal.      Nose: Nose normal.      Mouth/Throat:      Mouth: Mucous membranes are moist.      Pharynx: Oropharynx is clear.   Eyes:      Conjunctiva/sclera: Conjunctivae normal.   Neck:      Musculoskeletal: Neck supple.      Thyroid: No thyromegaly.      Vascular: No carotid bruit.   Cardiovascular:      Rate and Rhythm: Normal rate and regular rhythm.      Heart sounds: No murmur.   Pulmonary:      Effort: Pulmonary effort is normal.      Breath sounds: Normal breath sounds. No wheezing or  "rales.   Abdominal:      Palpations: Abdomen is soft. There is no mass.      Tenderness: There is no abdominal tenderness.   Lymphadenopathy:      Cervical: No cervical adenopathy.   Skin:     Findings: No rash.   Neurological:      Mental Status: She is alert.   Psychiatric:         Behavior: Behavior normal.               Assessment/Plan   Diagnoses and all orders for this visit:    1. Bipolar 1 disorder (CMS/HCC) (Primary)    2. Other fatigue    3. Migraine without aura and without status migrainosus, not intractable    4. Food allergy      Patient will continue to follow with her functional medicine doctor for \"histamine intolerance.\" If she needs further labs, I'm happy to order these for her but at the present time it does not appear she has any distinct allergies to food except for tamari, which would likely not appear on any of our food allergen panels. Pt will let me know if she would like additional food allergy testing in the future, although I feel this is best managed by her MD at [facility] who has been working with her on these issues.   Continue all routine meds.              Return in about 6 months (around 8/3/2021) for Annual physical.  "

## 2021-03-18 ENCOUNTER — OFFICE VISIT (OUTPATIENT)
Dept: ORTHOPEDIC SURGERY | Facility: CLINIC | Age: 46
End: 2021-03-18

## 2021-03-18 VITALS
BODY MASS INDEX: 29.7 KG/M2 | HEIGHT: 67 IN | SYSTOLIC BLOOD PRESSURE: 108 MMHG | WEIGHT: 189.2 LBS | DIASTOLIC BLOOD PRESSURE: 77 MMHG | HEART RATE: 76 BPM

## 2021-03-18 DIAGNOSIS — S93.492D SPRAIN OF ANTERIOR TALOFIBULAR LIGAMENT OF LEFT ANKLE, SUBSEQUENT ENCOUNTER: ICD-10-CM

## 2021-03-18 DIAGNOSIS — S82.65XD CLOSED NONDISPLACED FRACTURE OF LATERAL MALLEOLUS OF LEFT FIBULA WITH ROUTINE HEALING, SUBSEQUENT ENCOUNTER: Primary | ICD-10-CM

## 2021-03-18 PROCEDURE — 99024 POSTOP FOLLOW-UP VISIT: CPT | Performed by: PHYSICIAN ASSISTANT

## 2021-03-18 NOTE — PROGRESS NOTES
"    [facility] Note        Subjective     CC: Follow-up (7 weeks follow up; Closed nondisplaced fracture of lateral malleolus of left fibula with routine healing (DOI: 11/28/2020))      ADELA Mejía is a 45 y.o. female.  Patient returns for follow-up evaluation of her left ankle.  She has been working with PT.  She does note improvement of her symptoms.  She still has some mild discomfort to the ankle.  No reported numbness or tingling.    Patient endorses a pain scale of 2/10.  She still has some mild associated swelling to the lateral aspect of the ankle.  It usually worse with walking or stair climbing.    Overall, patient's symptoms are improved.    ROS:    Constiutional:Pt denies fever, chills, nausea, or vomiting.  MSK:as above        Objective      Past Medical History  Past Medical History:   Diagnosis Date   • Bipolar 1 disorder (CMS/[facility])    • Depression    • Personal history of other medical treatment 12/2020    pt states she has \"histamine intolerance\"         Physical Exam  /77   Pulse 76   Ht 170.2 cm (67.01\")   Wt 85.8 kg (189 lb 3.2 oz)   BMI 29.63 kg/m²     Body mass index is 29.63 kg/m².    Patient is well nourished and well developed.        Ortho Exam  Left ankle  Skin: Grossly intact without redness, warmth or swelling.  Tenderness: No tenderness noted to the tip of the fibula.  She does have some mild discomfort noted to ATFL and along peroneal tendons.  Motion: 10 degrees dorsiflexion to 40 degrees plantarflexion.  Strength: 4+/5 peroneals, 5/5 anterior tib, gastroc, EHL, FHL.  Testing: Anterior drawer, talar tilt and forced external rotation negative for any laxity.  Motor/sensory: Grossly intact C5-T1.      Imaging/Labs/EMG Reviewed:  Ordered left ankle plain films.  Imaging read by Dr. Meyers.    Indication: Closed treatment left ankle fracture     Comparison: Todays xrays were compared to previous xrays from 1/25/2021     IMPRESSION:      Left " ankle three views: Radiographs demonstrate consolidated lateral malleolar ankle fracture.  Ankle mortise remains symmetric.  No significant changes in alignment compared to prior radiographs.      Assessment:  1. Closed nondisplaced fracture of lateral malleolus of left fibula with routine healing, subsequent encounter    2. Sprain of anterior talofibular ligament of left ankle, subsequent encounter        Plan:  1. Nondisplaced fracture left distal fibula, healed.  2. Sprain ATFL, stable.  3. Patient to continue working with formal PT until she is met all their discharge criteria.  4. Recommend continue use of compression socks.  5. Recommend over-the-counter pain medication as needed.  6. Follow-up as needed.  7. Questions and concerns answered.      Nolvia Song PA-C  03/22/21  12:58 EDT      Dragon disclaimer:  Much of this encounter note is an electronic transcription/translation of spoken language to printed text. The electronic translation of spoken language may permit erroneous, or at times, nonsensical words or phrases to be inadvertently transcribed; Although I have reviewed the note for such errors, some may still exist.

## 2021-08-06 ENCOUNTER — OFFICE VISIT (OUTPATIENT)
Dept: INTERNAL MEDICINE | Facility: CLINIC | Age: 46
End: 2021-08-06

## 2021-08-06 VITALS
WEIGHT: 185.2 LBS | HEART RATE: 93 BPM | OXYGEN SATURATION: 97 % | TEMPERATURE: 98 F | BODY MASS INDEX: 28.07 KG/M2 | SYSTOLIC BLOOD PRESSURE: 108 MMHG | DIASTOLIC BLOOD PRESSURE: 70 MMHG | RESPIRATION RATE: 19 BRPM | HEIGHT: 68 IN

## 2021-08-06 DIAGNOSIS — L29.0 ANAL ITCHING: ICD-10-CM

## 2021-08-06 DIAGNOSIS — Z00.00 ENCOUNTER FOR HEALTH MAINTENANCE EXAMINATION: Primary | ICD-10-CM

## 2021-08-06 PROBLEM — M79.7 FIBROMYALGIA: Status: ACTIVE | Noted: 2021-07-14

## 2021-08-06 PROBLEM — D89.40 MAST CELL ACTIVATION SYNDROME: Status: ACTIVE | Noted: 2021-07-14

## 2021-08-06 PROCEDURE — 99396 PREV VISIT EST AGE 40-64: CPT | Performed by: PHYSICIAN ASSISTANT

## 2021-08-06 NOTE — PROGRESS NOTES
Chief Complaint   Patient presents with   • Annual Exam     non-fasting, w/o pap Seen at [facility]   • GI Problem     Discuss pin worms       Subjective       History of Present Illness     Lucille Mejía is a 45 y.o. female. She presents for her annual physical. Current concerns include pinworms. 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 She has itching at rectal region. She has had stool study previously but no pinworms found, through [facility] functional medicine doctor. Recently tried mebendozole which helped, and has also tried albendazole, Flagyl, Ivermetctin, Pyrental pamoate in the past with limited success.    Are you currently seeing any other doctors or specialists? Dr. Casarez-- Functional Medicine at [facility]; Betsy Thomason; Esha Ledesma-- psychiatry; [facility]   Are you currently taking any OTC medications or herbal medications? As noted in med list    Most recent colonoscopy: n/a  Most recent mammogram: 2017, per pt  Most recent pap smear: 5+ years ago   First day of last menses: regular, consistent, 7/19/2021 LMP    Regular dental visits: Yes, UTD  Regular eye exams: Yes, UTD    PHQ-2 Depression Screening  Little interest or pleasure in doing things? 0   Feeling down, depressed, or hopeless? 0   PHQ-2 Total Score 0         The following portions of the patient's history were reviewed and updated as appropriate: allergies, current medications, past medical history, past social history and problem list.    Allergies   Allergen Reactions   • Amoxicillin-Pot Clavulanate GI Intolerance   • Dexamethasone Mental Status Change     Manic episodes   • Sulfa Antibiotics Hives     Social History     Tobacco Use   • Smoking status: Never Smoker   • Smokeless tobacco: Never Used   Substance Use Topics   • Alcohol use: No     Past Surgical History:   Procedure Laterality Date   • FOOT SURGERY Right     Love's neuroma   •  LIPOMA EXCISION       History reviewed. No pertinent family history.      Current Outpatient Medications:   •  Cholecalciferol (VITAMIN D3) 5000 UNIT/ML liquid, Take  by mouth., Disp: , Rfl:   •  diphenhydrAMINE (BENADRYL) 25 MG tablet, Take 25 mg by mouth Every 6 (Six) Hours As Needed for Itching., Disp: , Rfl:   •  EPINEPHRINE HCL IJ, Inject  as directed., Disp: , Rfl:   •  escitalopram (LEXAPRO) 20 MG tablet, Take  by mouth., Disp: , Rfl:   •  letrozole (FEMARA) 2.5 MG tablet, Take  by mouth Every 30 (Thirty) Days. Pt states on 2nd day of her period is when she takes this medication, Disp: , Rfl:   •  lithium (LITHOBID) 300 MG CR tablet, , Disp: , Rfl:   •  lithium carbonate 300 MG capsule, Take 600 mg by mouth Daily., Disp: , Rfl:   •  Progesterone 100 MG suppository, Insert  into the vagina., Disp: , Rfl:   •  saccharomyces boulardii (FLORASTOR) 250 MG capsule, Take 250 mg by mouth 2 (Two) Times a Day. PRN, Disp: , Rfl:     Patient Active Problem List   Diagnosis   • Fatigue   • Allergic rhinitis   • Bipolar 1 disorder (CMS/HCC)   • Other ovarian dysfunction   • Fibromyalgia   • Mast cell activation syndrome (CMS/HCC)       Review of Systems   Constitutional: Positive for fatigue. Negative for chills and fever.   HENT: Positive for congestion and sinus pressure. Negative for ear pain and sore throat.    Eyes: Negative for blurred vision and pain.   Respiratory: Positive for cough. Negative for shortness of breath and wheezing.         +BROWN   Cardiovascular: Negative for chest pain, palpitations and leg swelling.   Gastrointestinal: Negative for abdominal pain, blood in stool, diarrhea, nausea and vomiting.        +anal itching   Endocrine: Negative for cold intolerance and heat intolerance.   Genitourinary: Negative for breast lump, breast pain and hematuria.   Musculoskeletal: Positive for back pain and myalgias. Negative for neck pain.   Allergic/Immunologic: Positive for environmental allergies and  immunocompromised state.   Psychiatric/Behavioral: Positive for depressed mood. The patient is nervous/anxious.        Objective   Vitals:    08/06/21 1117   BP: 108/70   Pulse: 93   Resp: 19   Temp: 98 °F (36.7 °C)   SpO2: 97%     Physical Exam  Vitals reviewed.   Constitutional:       Appearance: She is well-developed.   HENT:      Head: Normocephalic and atraumatic.      Right Ear: Tympanic membrane, ear canal and external ear normal. No tenderness.      Left Ear: Tympanic membrane, ear canal and external ear normal. No tenderness.      Nose: Nose normal.      Mouth/Throat:      Mouth: Mucous membranes are moist. No oral lesions.      Pharynx: Oropharynx is clear. Uvula midline.   Eyes:      General: No scleral icterus.     Conjunctiva/sclera: Conjunctivae normal.      Pupils: Pupils are equal, round, and reactive to light.   Neck:      Thyroid: No thyroid mass or thyromegaly.      Vascular: No carotid bruit.      Trachea: Trachea normal.   Cardiovascular:      Rate and Rhythm: Normal rate and regular rhythm.      Pulses: Normal pulses.           Radial pulses are 2+ on the right side and 2+ on the left side.        Dorsalis pedis pulses are 2+ on the right side and 2+ on the left side.      Heart sounds: Normal heart sounds. No murmur heard.   No gallop.    Pulmonary:      Effort: Pulmonary effort is normal.      Breath sounds: Normal breath sounds. No wheezing or rales.   Chest:      Chest wall: No deformity or tenderness.      Breasts:         Right: No mass.         Left: No mass.   Abdominal:      General: Bowel sounds are normal. There is no distension.      Palpations: Abdomen is soft. There is no mass.      Tenderness: There is no abdominal tenderness.   Musculoskeletal:         General: No deformity. Normal range of motion.      Cervical back: Normal range of motion and neck supple.   Lymphadenopathy:      Cervical: No cervical adenopathy.   Skin:     General: Skin is warm and dry.      Findings: No  rash.      Comments: No atypical nevi.    Neurological:      Mental Status: She is alert and oriented to person, place, and time.   Psychiatric:         Behavior: Behavior normal.               Assessment/Plan   Diagnoses and all orders for this visit:    1. Encounter for health maintenance examination (Primary)    2. Anal itching        Pt declines mammogram today and is aware of the risks of undiagnosed breast cancer.   Labs performed 6/25/2021 at OSH and reviewed with pt today.   Pt concern for ongoing pinworms infection. She would like to see GI for this. We discussed that it would be best to confirm her dx before referral to GI. She is going to call me to place ova/ parasite stool study when she feels she is in a pinworms flare, as her sx have been better since recent mebendozole tx. We also discussed she may have pruritis ani without pinworms, so I would want to investigate this further. Pt with good understanding and in agreement with plan.            Patient education discussed during this visit:  - avoidance of texting while driving and the need for wearing seatbelt  - use of sunscreen  - healthy sleep habits and appropriate amount of sleep  - H2O consumption, well-balanced diet  - exercise routine which includes at least 150 minutes of cardio per week + muscle strengthening exercises  - immunizations including annual flu vaccination      Return in about 6 months (around 2/6/2022) for Follow up.

## 2021-08-12 ENCOUNTER — LAB (OUTPATIENT)
Dept: INTERNAL MEDICINE | Facility: CLINIC | Age: 46
End: 2021-08-12

## 2021-08-12 DIAGNOSIS — B80 PINWORMS: Primary | ICD-10-CM

## 2021-08-12 DIAGNOSIS — B80 PINWORMS: ICD-10-CM

## 2021-08-12 PROCEDURE — 87177 OVA AND PARASITES SMEARS: CPT | Performed by: PHYSICIAN ASSISTANT

## 2021-08-12 PROCEDURE — 87209 SMEAR COMPLEX STAIN: CPT | Performed by: PHYSICIAN ASSISTANT

## 2021-08-16 LAB
O+P SPEC MICRO: NORMAL
O+P STL TRI STN: NORMAL

## 2022-03-31 ENCOUNTER — OFFICE VISIT (OUTPATIENT)
Dept: INTERNAL MEDICINE | Facility: CLINIC | Age: 47
End: 2022-03-31

## 2022-03-31 VITALS
HEIGHT: 68 IN | DIASTOLIC BLOOD PRESSURE: 60 MMHG | TEMPERATURE: 98 F | OXYGEN SATURATION: 97 % | WEIGHT: 189 LBS | SYSTOLIC BLOOD PRESSURE: 110 MMHG | BODY MASS INDEX: 28.64 KG/M2 | RESPIRATION RATE: 16 BRPM | HEART RATE: 87 BPM

## 2022-03-31 DIAGNOSIS — G43.009 MIGRAINE WITHOUT AURA AND WITHOUT STATUS MIGRAINOSUS, NOT INTRACTABLE: ICD-10-CM

## 2022-03-31 DIAGNOSIS — R53.82 CHRONIC FATIGUE: Primary | ICD-10-CM

## 2022-03-31 DIAGNOSIS — D89.40 MAST CELL ACTIVATION SYNDROME: ICD-10-CM

## 2022-03-31 PROCEDURE — 99213 OFFICE O/P EST LOW 20 MIN: CPT | Performed by: PHYSICIAN ASSISTANT

## 2022-08-09 ENCOUNTER — OFFICE VISIT (OUTPATIENT)
Dept: INTERNAL MEDICINE | Facility: CLINIC | Age: 47
End: 2022-08-09

## 2022-08-09 VITALS
DIASTOLIC BLOOD PRESSURE: 68 MMHG | BODY MASS INDEX: 31.33 KG/M2 | OXYGEN SATURATION: 97 % | RESPIRATION RATE: 16 BRPM | HEART RATE: 87 BPM | SYSTOLIC BLOOD PRESSURE: 98 MMHG | WEIGHT: 199.6 LBS | TEMPERATURE: 98 F | HEIGHT: 67 IN

## 2022-08-09 DIAGNOSIS — Z11.59 ENCOUNTER FOR HEPATITIS C SCREENING TEST FOR LOW RISK PATIENT: ICD-10-CM

## 2022-08-09 DIAGNOSIS — D89.40 MAST CELL ACTIVATION SYNDROME: ICD-10-CM

## 2022-08-09 DIAGNOSIS — R53.83 OTHER FATIGUE: ICD-10-CM

## 2022-08-09 DIAGNOSIS — Z13.220 SCREENING FOR LIPID DISORDERS: ICD-10-CM

## 2022-08-09 DIAGNOSIS — Z12.31 ENCOUNTER FOR SCREENING MAMMOGRAM FOR MALIGNANT NEOPLASM OF BREAST: ICD-10-CM

## 2022-08-09 DIAGNOSIS — Z00.00 ENCOUNTER FOR HEALTH MAINTENANCE EXAMINATION: Primary | ICD-10-CM

## 2022-08-09 DIAGNOSIS — M79.7 FIBROMYALGIA: ICD-10-CM

## 2022-08-09 PROCEDURE — 99396 PREV VISIT EST AGE 40-64: CPT | Performed by: PHYSICIAN ASSISTANT

## 2022-08-09 RX ORDER — CARIPRAZINE 1.5 MG/1
CAPSULE, GELATIN COATED ORAL
COMMUNITY
Start: 2022-07-08

## 2022-08-09 RX ORDER — KETOTIFEN FUMARATE 100 %
POWDER (GRAM) MISCELLANEOUS
Status: CANCELLED | OUTPATIENT
Start: 2022-08-09

## 2022-08-09 RX ORDER — DIVALPROEX SODIUM 250 MG/1
250-500 TABLET, EXTENDED RELEASE ORAL DAILY
COMMUNITY
Start: 2022-05-18

## 2022-08-09 RX ORDER — NALTREXONE HYDROCHLORIDE 50 MG/1
0.25 TABLET, FILM COATED ORAL DAILY
Status: CANCELLED | OUTPATIENT
Start: 2022-08-09

## 2022-08-09 NOTE — PROGRESS NOTES
Chief Complaint   Patient presents with   • Annual Exam   • Depression       Subjective       History of Present Illness     Lucille Mejía is a 46 y.o. female. She presents for an annual examination.      Migraines  The patient states that her migraines have improved since starting Depakote.    Depression   She reports experiencing significant fatigue and depression. She confirms that she is still seeing her psychiatrist, Dr. Esha Ledesma. Changes to her medications have been made. She states that she has anxiety, but the anxiety is associated with depression. She feels like she is going through perimenopause, but her menstrual periods have been regular. She expresses that she is having low moods, and her thoughts tend to be negative. She is still taking lithium 300 mg, Vraylar 1.5 mg 3 days a week, and Lexapro. The patient is still taking naltrexone as well.    Diarrhea   She notes intermittent diarrhea, but she tends to associate it with MCAS, a food reaction or her nervous system. She avoids certain food to prevent diarrhea. She notes having diarrhea yesterday, but associates it with stress from traveling.     Mast cell activation syndrome.  She sees Betsy Thomason, a naturopathic provider, for her mast cell activation syndrome.    Health maintenance   Her diet is somewhat healthy. She walks 5 to 6 days a week, but she gets occasional fatigue. She denies any chest pain or shortness of breath. She is up to date with her Pap smear. She notes breast tenderness when she is on her menstrual period. Her last mammogram was in 2017. She denies having a colonoscopy. She is up to date on her dental visits and eye exams.     Are you currently seeing any other doctors or specialists? Dr. Casarez recently left practice, pt has not yet re-established with new MD-- Functional Medicine at [facility]; Betsy Thomason; Esha Ledesma-- psychiatry; EdwigeD-- OBGYN   Are you currently taking any OTC medications or  herbal medications? As noted in med list    Diet: somewhat healthy, per pt  Exercise: walking 5 days/ week       Most recent colonoscopy: never had   Most recent mammogram: 2017  Most recent pap smear: 6/17/2021  First day of last menses: regular, consistent     Regular dental visits: Yes, UTD  Regular eye exams: Yes, UTD    The following portions of the patient's history were reviewed and updated as appropriate: allergies, current medications, past family history, past medical history, past social history and problem list.    Allergies   Allergen Reactions   • Amoxicillin-Pot Clavulanate GI Intolerance   • Dexamethasone Mental Status Change     Manic episodes   • Sulfa Antibiotics Hives     Social History     Tobacco Use   • Smoking status: Never Smoker   • Smokeless tobacco: Never Used   Substance Use Topics   • Alcohol use: No     Past Surgical History:   Procedure Laterality Date   • FOOT SURGERY Right     Love's neuroma   • LIPOMA EXCISION       History reviewed. No pertinent family history.      Current Outpatient Medications:   •  diphenhydrAMINE (BENADRYL) 25 MG tablet, Take 25 mg by mouth Every 6 (Six) Hours As Needed for Itching., Disp: , Rfl:   •  divalproex (DEPAKOTE ER) 250 MG 24 hr tablet, Take 250-500 mg by mouth Daily., Disp: , Rfl:   •  escitalopram (LEXAPRO) 20 MG tablet, Take  by mouth., Disp: , Rfl:   •  Ketotifen Fumarate powder, , Disp: , Rfl:   •  NALTREXONE HCL PO, Take 0.25 mg by mouth Daily., Disp: , Rfl:   •  Vraylar 1.5 MG capsule capsule, , Disp: , Rfl:   •  EPINEPHRINE HCL IJ, Inject  as directed., Disp: , Rfl:   •  lithium (LITHOBID) 300 MG CR tablet, , Disp: , Rfl:   •  lithium carbonate 150 MG capsule, , Disp: , Rfl:   •  saccharomyces boulardii (FLORASTOR) 250 MG capsule, Take 250 mg by mouth 2 (Two) Times a Day. PRN, Disp: , Rfl:     Patient Active Problem List   Diagnosis   • Fatigue   • Allergic rhinitis   • Bipolar 1 disorder (HCC)   • Other ovarian dysfunction   •  Fibromyalgia   • Mast cell activation syndrome (HCC)       Review of Systems   Constitutional: Positive for fatigue. Negative for chills and fever.   HENT: Negative for congestion, ear pain and sore throat.    Eyes: Negative for pain.   Respiratory: Negative for cough, shortness of breath and wheezing.    Cardiovascular: Negative for chest pain and palpitations.   Gastrointestinal: Positive for diarrhea. Negative for abdominal pain, nausea and vomiting.   Genitourinary: Negative for breast lump, breast pain, dysuria and hematuria.   Musculoskeletal: Positive for myalgias. Negative for back pain.   Skin: Negative for rash.   Neurological: Positive for headache. Negative for dizziness, syncope and weakness.   Psychiatric/Behavioral: Positive for depressed mood and stress. The patient is not nervous/anxious.        Objective   Vitals:    08/09/22 1006   BP: 98/68   Pulse: 87   Resp: 16   Temp: 98 °F (36.7 °C)   SpO2: 97%     Body mass index is 31.33 kg/m².    Physical Exam  Constitutional:       Appearance: Normal appearance. She is well-developed.   HENT:      Head: Normocephalic and atraumatic.      Right Ear: Tympanic membrane, ear canal and external ear normal.      Left Ear: Tympanic membrane, ear canal and external ear normal.      Nose: Nose normal.   Eyes:      Conjunctiva/sclera: Conjunctivae normal.      Pupils: Pupils are equal, round, and reactive to light.   Neck:      Thyroid: No thyromegaly.      Vascular: No carotid bruit.   Cardiovascular:      Rate and Rhythm: Normal rate and regular rhythm.      Heart sounds: No murmur heard.  Pulmonary:      Effort: Pulmonary effort is normal.      Breath sounds: Normal breath sounds. No wheezing or rales.   Abdominal:      Tenderness: There is no abdominal tenderness.   Musculoskeletal:      Cervical back: Normal range of motion and neck supple.   Lymphadenopathy:      Cervical: No cervical adenopathy.   Skin:     Findings: No rash.   Psychiatric:         Behavior:  Behavior normal.               Assessment & Plan   Diagnoses and all orders for this visit:    1. Encounter for health maintenance examination (Primary)  -     CBC & Differential; Future  -     Comprehensive Metabolic Panel; Future  -     Hepatitis C Antibody; Future  -     Lipid Panel; Future  -     Vitamin D 25 Hydroxy; Future  -     TSH; Future    2. Mast cell activation syndrome (HCC)    3. Fibromyalgia  -     CBC & Differential; Future  -     Comprehensive Metabolic Panel; Future    4. Other fatigue  -     CBC & Differential; Future  -     Comprehensive Metabolic Panel; Future  -     Vitamin D 25 Hydroxy; Future  -     TSH; Future    5. Encounter for screening mammogram for malignant neoplasm of breast  -     Mammo Screening Digital Tomosynthesis Bilateral With CAD; Future    6. Encounter for hepatitis C screening test for low risk patient  -     Hepatitis C Antibody; Future    7. Screening for lipid disorders  -     Lipid Panel; Future        1. Fatigue  - She will obtain fasting labs within the next week.    2. Depression  - She will continue to follow up with her psychiatrist.    3. Migraines  - She will continue to take Depakote as prescribed.       Patient education discussed during this visit:  - avoidance of texting while driving and the need for wearing seatbelt  - use of sunscreen  - healthy sleep habits and appropriate amount of sleep  - H2O consumption, well-balanced diet  - exercise routine which includes at least 150 minutes of cardio per week + muscle strengthening exercises  - immunizations including annual flu vaccination      Return in about 6 months (around 2/9/2023) for Follow up- Shanika .     Transcribed from ambient dictation for Daisha Randhawa PA-C by Lucinda Shafer .  08/09/22   15:23 EDT    Patient verbalized consent to the visit recording.  I have personally performed the services described in this document as transcribed by the above individual, and it is both accurate and complete.   Daisha Randhawa PA-C  8/11/2022  15:40 EDT

## 2022-08-12 ENCOUNTER — LAB (OUTPATIENT)
Dept: INTERNAL MEDICINE | Facility: CLINIC | Age: 47
End: 2022-08-12

## 2022-08-12 DIAGNOSIS — Z11.59 ENCOUNTER FOR HEPATITIS C SCREENING TEST FOR LOW RISK PATIENT: ICD-10-CM

## 2022-08-12 DIAGNOSIS — Z13.220 SCREENING FOR LIPID DISORDERS: ICD-10-CM

## 2022-08-12 DIAGNOSIS — R53.83 OTHER FATIGUE: ICD-10-CM

## 2022-08-12 DIAGNOSIS — Z00.00 ENCOUNTER FOR HEALTH MAINTENANCE EXAMINATION: ICD-10-CM

## 2022-08-12 DIAGNOSIS — M79.7 FIBROMYALGIA: ICD-10-CM

## 2022-08-12 LAB
ALBUMIN SERPL-MCNC: 4.3 G/DL (ref 3.5–5.2)
ALBUMIN/GLOB SERPL: 2 G/DL
ALP SERPL-CCNC: 57 U/L (ref 39–117)
ALT SERPL W P-5'-P-CCNC: 38 U/L (ref 1–33)
ANION GAP SERPL CALCULATED.3IONS-SCNC: 12.1 MMOL/L (ref 5–15)
AST SERPL-CCNC: 29 U/L (ref 1–32)
BASOPHILS # BLD AUTO: 0.02 10*3/MM3 (ref 0–0.2)
BASOPHILS NFR BLD AUTO: 0.3 % (ref 0–1.5)
BILIRUB SERPL-MCNC: 0.3 MG/DL (ref 0–1.2)
BUN SERPL-MCNC: 7 MG/DL (ref 6–20)
BUN/CREAT SERPL: 11.9 (ref 7–25)
CALCIUM SPEC-SCNC: 9.4 MG/DL (ref 8.6–10.5)
CHLORIDE SERPL-SCNC: 103 MMOL/L (ref 98–107)
CO2 SERPL-SCNC: 21.9 MMOL/L (ref 22–29)
CREAT SERPL-MCNC: 0.59 MG/DL (ref 0.57–1)
DEPRECATED RDW RBC AUTO: 42.1 FL (ref 37–54)
EGFRCR SERPLBLD CKD-EPI 2021: 113.4 ML/MIN/1.73
EOSINOPHIL # BLD AUTO: 0.47 10*3/MM3 (ref 0–0.4)
EOSINOPHIL NFR BLD AUTO: 6.5 % (ref 0.3–6.2)
ERYTHROCYTE [DISTWIDTH] IN BLOOD BY AUTOMATED COUNT: 12.4 % (ref 12.3–15.4)
GLOBULIN UR ELPH-MCNC: 2.2 GM/DL
GLUCOSE SERPL-MCNC: 77 MG/DL (ref 65–99)
HCT VFR BLD AUTO: 40 % (ref 34–46.6)
HGB BLD-MCNC: 13.1 G/DL (ref 12–15.9)
IMM GRANULOCYTES # BLD AUTO: 0.06 10*3/MM3 (ref 0–0.05)
IMM GRANULOCYTES NFR BLD AUTO: 0.8 % (ref 0–0.5)
LYMPHOCYTES # BLD AUTO: 1.73 10*3/MM3 (ref 0.7–3.1)
LYMPHOCYTES NFR BLD AUTO: 23.9 % (ref 19.6–45.3)
MCH RBC QN AUTO: 30.3 PG (ref 26.6–33)
MCHC RBC AUTO-ENTMCNC: 32.8 G/DL (ref 31.5–35.7)
MCV RBC AUTO: 92.4 FL (ref 79–97)
MONOCYTES # BLD AUTO: 0.65 10*3/MM3 (ref 0.1–0.9)
MONOCYTES NFR BLD AUTO: 9 % (ref 5–12)
NEUTROPHILS NFR BLD AUTO: 4.3 10*3/MM3 (ref 1.7–7)
NEUTROPHILS NFR BLD AUTO: 59.5 % (ref 42.7–76)
NRBC BLD AUTO-RTO: 0 /100 WBC (ref 0–0.2)
PLATELET # BLD AUTO: 264 10*3/MM3 (ref 140–450)
PMV BLD AUTO: 10.8 FL (ref 6–12)
POTASSIUM SERPL-SCNC: 4.6 MMOL/L (ref 3.5–5.2)
PROT SERPL-MCNC: 6.5 G/DL (ref 6–8.5)
RBC # BLD AUTO: 4.33 10*6/MM3 (ref 3.77–5.28)
SODIUM SERPL-SCNC: 137 MMOL/L (ref 136–145)
WBC NRBC COR # BLD: 7.23 10*3/MM3 (ref 3.4–10.8)

## 2022-08-12 PROCEDURE — 80061 LIPID PANEL: CPT | Performed by: PHYSICIAN ASSISTANT

## 2022-08-12 PROCEDURE — 86803 HEPATITIS C AB TEST: CPT | Performed by: PHYSICIAN ASSISTANT

## 2022-08-12 PROCEDURE — 82306 VITAMIN D 25 HYDROXY: CPT | Performed by: PHYSICIAN ASSISTANT

## 2022-08-12 PROCEDURE — 80050 GENERAL HEALTH PANEL: CPT | Performed by: PHYSICIAN ASSISTANT

## 2022-08-13 LAB
25(OH)D3 SERPL-MCNC: 27.5 NG/ML (ref 30–100)
CHOLEST SERPL-MCNC: 255 MG/DL (ref 0–200)
HCV AB SER DONR QL: NORMAL
HDLC SERPL-MCNC: 45 MG/DL (ref 40–60)
LDLC SERPL CALC-MCNC: 164 MG/DL (ref 0–100)
LDLC/HDLC SERPL: 3.58 {RATIO}
TRIGL SERPL-MCNC: 244 MG/DL (ref 0–150)
TSH SERPL DL<=0.05 MIU/L-ACNC: 3.32 UIU/ML (ref 0.27–4.2)
VLDLC SERPL-MCNC: 46 MG/DL (ref 5–40)

## 2022-08-18 ENCOUNTER — TELEPHONE (OUTPATIENT)
Dept: INTERNAL MEDICINE | Facility: CLINIC | Age: 47
End: 2022-08-18

## 2022-08-18 PROBLEM — E55.9 VITAMIN D DEFICIENCY: Status: ACTIVE | Noted: 2022-08-18

## 2022-08-18 PROBLEM — E78.49 OTHER HYPERLIPIDEMIA: Status: ACTIVE | Noted: 2022-08-18

## 2022-08-18 NOTE — TELEPHONE ENCOUNTER
Please call patient let her know she has mild Vit D deficiency, which could be contributing to her fatigue. Please see if she is taking is taking a Vit D supplement. If not, would like her to begin OTC Vit D3, 2000 units daily with breakfast to improve this.   Her cholesterol is also quite high, placing her in high risk category for future cardiovascular event. She would benefit from beginning a low dose statin, along with changes in diet and increased exercise. Please let me know if she is in agreement to begin a statin so I can send in.

## 2022-08-24 NOTE — TELEPHONE ENCOUNTER
"Spoke with patient, informed that Ginny said \"let her know she has mild Vit D deficiency, which could be contributing to her fatigue. Please see if she is taking is taking a Vit D supplement. If not, would like her to begin OTC Vit D3, 2000 units daily with breakfast to improve this.   Her cholesterol is also quite high, placing her in high risk category for future cardiovascular event. She would benefit from beginning a low dose statin, along with changes in diet and increased exercise. Please let me know if she is in agreement to begin a statin so I can send in.\"      Verbalized understanding and states she will start the Vitamin D3 supplement but that she is not going to take a statin at this time.  Encouraged diet and exercise then.  Pt is scheduled to follow up with MARGY Voss.  "

## 2022-08-31 ENCOUNTER — APPOINTMENT (OUTPATIENT)
Dept: CT IMAGING | Facility: HOSPITAL | Age: 47
End: 2022-08-31

## 2022-08-31 ENCOUNTER — HOSPITAL ENCOUNTER (EMERGENCY)
Facility: HOSPITAL | Age: 47
Discharge: HOME | End: 2022-08-31
Attending: EMERGENCY MEDICINE | Admitting: EMERGENCY MEDICINE

## 2022-08-31 VITALS
OXYGEN SATURATION: 99 % | BODY MASS INDEX: 31.39 KG/M2 | SYSTOLIC BLOOD PRESSURE: 114 MMHG | DIASTOLIC BLOOD PRESSURE: 72 MMHG | TEMPERATURE: 98.1 F | HEIGHT: 67 IN | RESPIRATION RATE: 16 BRPM | HEART RATE: 88 BPM | WEIGHT: 200 LBS

## 2022-08-31 DIAGNOSIS — G43.909 MIGRAINE WITHOUT STATUS MIGRAINOSUS, NOT INTRACTABLE, UNSPECIFIED MIGRAINE TYPE: Primary | ICD-10-CM

## 2022-08-31 LAB
HOLD SPECIMEN: NORMAL
WHOLE BLOOD HOLD COAG: NORMAL
WHOLE BLOOD HOLD SPECIMEN: NORMAL

## 2022-08-31 PROCEDURE — 99283 EMERGENCY DEPT VISIT LOW MDM: CPT

## 2022-08-31 PROCEDURE — 25010000002 METOCLOPRAMIDE PER 10 MG: Performed by: EMERGENCY MEDICINE

## 2022-08-31 PROCEDURE — 96375 TX/PRO/DX INJ NEW DRUG ADDON: CPT

## 2022-08-31 PROCEDURE — 25010000002 DIPHENHYDRAMINE PER 50 MG: Performed by: EMERGENCY MEDICINE

## 2022-08-31 PROCEDURE — 70450 CT HEAD/BRAIN W/O DYE: CPT

## 2022-08-31 PROCEDURE — 25010000002 KETOROLAC TROMETHAMINE PER 15 MG: Performed by: EMERGENCY MEDICINE

## 2022-08-31 PROCEDURE — 96374 THER/PROPH/DIAG INJ IV PUSH: CPT

## 2022-08-31 RX ORDER — DIPHENHYDRAMINE HYDROCHLORIDE 50 MG/ML
25 INJECTION INTRAMUSCULAR; INTRAVENOUS ONCE
Status: COMPLETED | OUTPATIENT
Start: 2022-08-31 | End: 2022-08-31

## 2022-08-31 RX ORDER — METOCLOPRAMIDE HYDROCHLORIDE 5 MG/ML
10 INJECTION INTRAMUSCULAR; INTRAVENOUS ONCE
Status: COMPLETED | OUTPATIENT
Start: 2022-08-31 | End: 2022-08-31

## 2022-08-31 RX ORDER — SODIUM CHLORIDE 0.9 % (FLUSH) 0.9 %
10 SYRINGE (ML) INJECTION AS NEEDED
Status: DISCONTINUED | OUTPATIENT
Start: 2022-08-31 | End: 2022-08-31 | Stop reason: HOSPADM

## 2022-08-31 RX ORDER — KETOROLAC TROMETHAMINE 15 MG/ML
15 INJECTION, SOLUTION INTRAMUSCULAR; INTRAVENOUS ONCE
Status: COMPLETED | OUTPATIENT
Start: 2022-08-31 | End: 2022-08-31

## 2022-08-31 RX ADMIN — METOCLOPRAMIDE 10 MG: 5 INJECTION, SOLUTION INTRAMUSCULAR; INTRAVENOUS at 17:41

## 2022-08-31 RX ADMIN — KETOROLAC TROMETHAMINE 15 MG: 15 INJECTION, SOLUTION INTRAMUSCULAR; INTRAVENOUS at 17:41

## 2022-08-31 RX ADMIN — DIPHENHYDRAMINE HYDROCHLORIDE 25 MG: 50 INJECTION, SOLUTION INTRAMUSCULAR; INTRAVENOUS at 17:41

## 2022-08-31 RX ADMIN — SODIUM CHLORIDE 1000 ML: 9 INJECTION, SOLUTION INTRAVENOUS at 17:40

## 2022-09-01 NOTE — DISCHARGE INSTRUCTIONS
Make sure to drink plenty of fluids.    Take Tylenol or ibuprofen to help with headache.    Follow-up with neurology for outpatient evaluation.

## 2022-09-29 NOTE — PROGRESS NOTES
Follow Up Office Visit      Date: 2022   Patient Name: Lucille Mejía  : 1975   MRN: [id number]     Chief Complaint:    Chief Complaint   Patient presents with   • Migraine     3 years       History of Present Illness: Lucille Mejía is a 46 y.o. female with past medical history of allergic rhinitis, hyperlipidemia, vitamin D deficiency, bipolar 1 on Vraylar and lithium, mast cell activation syndrome, fibromyalgia who is here today to follow up with our clinic for migraines.    HPI   Vitamin D deficiency:  Taking supplement? yes    Headaches  Has appointment with neurologist in November  Very allergic to ragweed, which she feels is contributing to her symptoms.  Mom history of bad migraines     HEADACHE HISTORY   Age at onset: Headaches age 36. Similar to current.   Character: Sharp character around eye brows, non pulsatile  Duration: 1-2d  Intensity:  Peak   Frequency: 9 at peak, 0 down  Location: above the eye, posterior     Associated symptoms:   Photophobia: +  Phonophobia: +  Nausea: -  Vomiting: -  Osmophobia: No   Kinesiophobia: -  Neck tightness/pain: yes  Nasal congestion: +  Visual symptoms: -  Other symptoms: None     Alleviating factors: Cephaly helps. Excedrin migraine or ibuprofen  Exacerbating factors: as above  Pain awaken at night: no  Triggers: Stress  Family history of headache: Strong in all siblings and parents  Head / neck injury: no    Sleep: Sleeping 9-10 hours a day, improves headache.   Did home sleep test, was recommend in patient sleep study in past.     Workup to date: CT scan of head      CURRENT ACUTE MANAGEMENT: excedrin migraine, sleep  CURRENT PREVENTION: depakote 1000mg daily  PREVIOUSLY FAILED ACUTE MANAGEMENT: rizatriptan  PREVIOUSLY FAILED PREVENTION: none      Esha [place], psychiatry. Was prescribed depakote by her for both mood stabilization and migraine prevention, currently 1000mg daily. Increased this week, feels that this is  doing better.     Does report snoring and morning headaches. Previously had home overnight pulse oximeter done through  that was abnormal. Reports she was recommended to obtain an official sleep study, but was unable to have this performed due to covid 19 pandemic.      Subjective      Review of Systems:   Review of Systems   Constitutional: Negative for fatigue and fever.   HENT: Positive for congestion. Negative for ear pain and trouble swallowing.    Eyes: Positive for photophobia. Negative for pain.   Respiratory: Negative for cough and shortness of breath.    Cardiovascular: Negative for chest pain, palpitations and leg swelling.   Gastrointestinal: Negative for nausea and vomiting.   Endocrine: Negative for polydipsia and polyuria.   Musculoskeletal: Positive for neck pain (tight muscles).   Skin: Negative for rash and wound.   Neurological: Positive for headache. Negative for dizziness and confusion.       I have reviewed the patients family history, social history, past medical history, past surgical history and have updated it as appropriate.     Medications:     Current Outpatient Medications:   •  diphenhydrAMINE (BENADRYL) 25 MG tablet, Take 25 mg by mouth Every 6 (Six) Hours As Needed for Itching., Disp: , Rfl:   •  divalproex (DEPAKOTE ER) 250 MG 24 hr tablet, Take 250-500 mg by mouth Daily., Disp: , Rfl:   •  EPINEPHRINE HCL IJ, Inject  as directed., Disp: , Rfl:   •  escitalopram (LEXAPRO) 20 MG tablet, Take  by mouth., Disp: , Rfl:   •  Ketotifen Fumarate powder, , Disp: , Rfl:   •  lithium (LITHOBID) 300 MG CR tablet, , Disp: , Rfl:   •  NALTREXONE HCL PO, Take 0.25 mg by mouth Daily., Disp: , Rfl:   •  Vraylar 1.5 MG capsule capsule, , Disp: , Rfl:   •  Rimegepant Sulfate (NURTEC) 75 MG tablet dispersible tablet, Take 1 tablet by mouth 1 (One) Time As Needed (headache) for up to 1 dose., Disp: 2 tablet, Rfl: 0  •  SUMAtriptan (IMITREX) 50 MG tablet, Take 1 tablet by mouth Every 2 (Two) Hours As  "Needed for Migraine for up to 10 doses. Take one tablet at onset of headache. May repeat dose one time in 2 hours if headache not relieved., Disp: 10 tablet, Rfl: 0    Allergies:   Allergies   Allergen Reactions   • Amoxicillin-Pot Clavulanate GI Intolerance   • Dexamethasone Mental Status Change     Manic episodes   • Sulfa Antibiotics Hives       Objective     Physical Exam: Please see above  Vital Signs:   Vitals:    09/30/22 0901   BP: 112/64   BP Location: Left arm   Patient Position: Sitting   Cuff Size: Adult   Pulse: 80   Resp: 20   Temp: 97.8 °F (36.6 °C)   TempSrc: Temporal   Weight: 91.7 kg (202 lb 4 oz)   Height: 170.2 cm (67\")   PainSc:   2     Body mass index is 31.68 kg/m².  BMI is >= 30 and <35. (Class 1 Obesity). The following options were offered after discussion;: will discuss at future appointment       Physical Exam  Vitals reviewed.   Constitutional:       General: She is not in acute distress.     Appearance: Normal appearance. She is obese. She is not ill-appearing or toxic-appearing.      Comments: Prefers room to be dark due to current headache     HENT:      Head: Normocephalic and atraumatic.      Right Ear: External ear normal.      Left Ear: External ear normal.      Nose: Nose normal. No congestion.      Mouth/Throat:      Mouth: Mucous membranes are moist.      Pharynx: No oropharyngeal exudate or posterior oropharyngeal erythema.   Eyes:      General: No scleral icterus.     Extraocular Movements: Extraocular movements intact.      Pupils: Pupils are equal, round, and reactive to light.   Cardiovascular:      Rate and Rhythm: Normal rate and regular rhythm.      Pulses: Normal pulses.      Heart sounds: No murmur heard.    No friction rub. No gallop.   Pulmonary:      Effort: Pulmonary effort is normal. No respiratory distress.      Breath sounds: Normal breath sounds. No stridor. No wheezing or rales.   Abdominal:      General: Abdomen is flat. Bowel sounds are normal.      " Palpations: Abdomen is soft.   Musculoskeletal:         General: No swelling or tenderness. Normal range of motion.      Cervical back: Normal range of motion. No rigidity.      Comments: Tight shoulder and neck musculature bilaterally   Lymphadenopathy:      Cervical: No cervical adenopathy.   Skin:     General: Skin is warm and dry.      Capillary Refill: Capillary refill takes less than 2 seconds.      Findings: No erythema or rash.   Neurological:      General: No focal deficit present.      Mental Status: She is alert and oriented to person, place, and time.      Cranial Nerves: No cranial nerve deficit.      Sensory: No sensory deficit.      Motor: No weakness.      Gait: Gait normal.   Psychiatric:         Mood and Affect: Mood normal.         Behavior: Behavior normal.         Judgment: Judgment normal.         Procedures    Results:   Labs:   TSH   Date Value Ref Range Status   08/12/2022 3.320 0.270 - 4.200 uIU/mL Final   Personally reviewed CMP dated 8/12/2022 notable for mild anion gap acidosis and slight elevation of AST at 38.  TSH on same date within normal limits.  Total cholesterol elevated with elevation of LDL to 164, VLDL of 46, triglycerides of 244.  Vitamin D same date Mobic 27.5    Imaging:   CT head without contrast dated 8/31/2022 with no acute findings.  I personally reviewed this.    Assessment / Plan      Assessment/Plan:   Problem List Items Addressed This Visit        Endocrine and Metabolic    Vitamin D deficiency    Current Assessment & Plan     Continue OTC supplementation with 1000IU daily            Neuro    Migraine without aura and without status migrainosus, not intractable    Overview     Started age 36. Worsened over past 3 years. Triggers thought to be stress and allergies. Takes Benadryl regularly for mast cell activation syndrome (following with [facility])         Current Assessment & Plan     Headaches are worsening.  Advised to keep a headache diary.  Counseled  regarding lifestyle modifications.  Medication changes per orders.  Follow up with neurology as scheduled in November   Patient states that she has previously failed Maxalt.  Has strong family history of migraines and all siblings and parents.  Currently on Depakote 1000 mg for both mood stabilization and migraine prevention per her psychiatrist.  Does feel this is improved headaches.  Willing to trial sumatriptan hand today.  Will prescribe and provided handouts as well as discussed appropriate dosing.  Patient without history of hypertension, CAD, chest pain, stroke.  Also provided samples of Nurtec, to be used following day of sumatriptan not effective.             Relevant Medications    SUMAtriptan (IMITREX) 50 MG tablet    Rimegepant Sulfate (NURTEC) 75 MG tablet dispersible tablet    Other Relevant Orders    Ambulatory Referral to Sleep Medicine       Other    History of sleep study    Overview     Reports abnormal overnight pulse oximetry in the past, estimated 2020.  Never had official sleep study but this was recommended.         Current Assessment & Plan     Currently endorses snoring and morning headache.  Will refer to sleep medicine for official sleep study.         Relevant Orders    Ambulatory Referral to Sleep Medicine      Other Visit Diagnoses     Screening for colorectal cancer    -  Primary    Relevant Orders    Ambulatory Referral For Screening Colonoscopy    Snoring        Relevant Orders    Ambulatory Referral to Sleep Medicine            Follow Up:   Return in about 4 months (around 1/30/2023) for Recheck migraines, sleep study, .        Irineo Hernandez MD   [facility] [place] [facility]

## 2022-09-30 ENCOUNTER — OFFICE VISIT (OUTPATIENT)
Dept: INTERNAL MEDICINE | Facility: CLINIC | Age: 47
End: 2022-09-30

## 2022-09-30 ENCOUNTER — TELEPHONE (OUTPATIENT)
Dept: INTERNAL MEDICINE | Facility: CLINIC | Age: 47
End: 2022-09-30

## 2022-09-30 VITALS
SYSTOLIC BLOOD PRESSURE: 112 MMHG | DIASTOLIC BLOOD PRESSURE: 64 MMHG | RESPIRATION RATE: 20 BRPM | HEART RATE: 80 BPM | WEIGHT: 202.25 LBS | HEIGHT: 67 IN | BODY MASS INDEX: 31.74 KG/M2 | TEMPERATURE: 97.8 F

## 2022-09-30 DIAGNOSIS — Z92.89 HISTORY OF SLEEP STUDY: ICD-10-CM

## 2022-09-30 DIAGNOSIS — Z12.12 SCREENING FOR COLORECTAL CANCER: Primary | ICD-10-CM

## 2022-09-30 DIAGNOSIS — Z12.11 SCREENING FOR COLORECTAL CANCER: Primary | ICD-10-CM

## 2022-09-30 DIAGNOSIS — R06.83 SNORING: ICD-10-CM

## 2022-09-30 DIAGNOSIS — G43.009 MIGRAINE WITHOUT AURA AND WITHOUT STATUS MIGRAINOSUS, NOT INTRACTABLE: ICD-10-CM

## 2022-09-30 DIAGNOSIS — E55.9 VITAMIN D DEFICIENCY: ICD-10-CM

## 2022-09-30 PROCEDURE — 99214 OFFICE O/P EST MOD 30 MIN: CPT | Performed by: STUDENT IN AN ORGANIZED HEALTH CARE EDUCATION/TRAINING PROGRAM

## 2022-09-30 RX ORDER — ERGOCALCIFEROL 1.25 MG/1
50000 CAPSULE ORAL WEEKLY
Qty: 5 CAPSULE | Refills: 2 | Status: CANCELLED | OUTPATIENT
Start: 2022-09-30

## 2022-09-30 RX ORDER — SUMATRIPTAN 50 MG/1
50 TABLET, FILM COATED ORAL
Qty: 10 TABLET | Refills: 0 | Status: SHIPPED | OUTPATIENT
Start: 2022-09-30 | End: 2022-11-28 | Stop reason: SDUPTHER

## 2022-09-30 NOTE — PATIENT INSTRUCTIONS
Health Maintenance, Female  Adopting a healthy lifestyle and getting preventive care can go a long way to promote health and wellness. Talk with your health care provider about what schedule of regular examinations is right for you. This is a good chance for you to check in with your provider about disease prevention and staying healthy.  In between checkups, there are plenty of things you can do on your own. Experts have done a lot of research about which lifestyle changes and preventive measures are most likely to keep you healthy. Ask your health care provider for more information.  Weight and diet  Eat a healthy diet  Be sure to include plenty of vegetables, fruits, low-fat dairy products, and lean protein.  Do not eat a lot of foods high in solid fats, added sugars, or salt.  Get regular exercise. This is one of the most important things you can do for your health.  Most adults should exercise for at least 150 minutes each week. The exercise should increase your heart rate and make you sweat (moderate-intensity exercise).  Most adults should also do strengthening exercises at least twice a week. This is in addition to the moderate-intensity exercise.     Maintain a healthy weight  Body mass index (BMI) is a measurement that can be used to identify possible weight problems. It estimates body fat based on height and weight. Your health care provider can help determine your BMI and help you achieve or maintain a healthy weight.  For females 20 years of age and older:  A BMI below 18.5 is considered underweight.  A BMI of 18.5 to 24.9 is normal.  A BMI of 25 to 29.9 is considered overweight.  A BMI of 30 and above is considered obese.     Watch levels of cholesterol and blood lipids  You should start having your blood tested for lipids and cholesterol at 20 years of age, then have this test every 5 years.  You may need to have your cholesterol levels checked more often if:  Your lipid or cholesterol levels are  high.  You are older than 50 years of age.  You are at high risk for heart disease.     Cancer screening  Lung Cancer  Lung cancer screening is recommended for adults 55-80 years old who are at high risk for lung cancer because of a history of smoking.  A yearly low-dose CT scan of the lungs is recommended for people who:  Currently smoke.  Have quit within the past 15 years.  Have at least a 30-pack-year history of smoking. A pack year is smoking an average of one pack of cigarettes a day for 1 year.  Yearly screening should continue until it has been 15 years since you quit.  Yearly screening should stop if you develop a health problem that would prevent you from having lung cancer treatment.     Breast Cancer  Practice breast self-awareness. This means understanding how your breasts normally appear and feel.  It also means doing regular breast self-exams. Let your health care provider know about any changes, no matter how small.  If you are in your 20s or 30s, you should have a clinical breast exam (CBE) by a health care provider every 1-3 years as part of a regular health exam.  If you are 40 or older, have a CBE every year. Also consider having a breast X-ray (mammogram) every year.  If you have a family history of breast cancer, talk to your health care provider about genetic screening.  If you are at high risk for breast cancer, talk to your health care provider about having an MRI and a mammogram every year.  Breast cancer gene (BRCA) assessment is recommended for women who have family members with BRCA-related cancers. BRCA-related cancers include:  Breast.  Ovarian.  Tubal.  Peritoneal cancers.  Results of the assessment will determine the need for genetic counseling and BRCA1 and BRCA2 testing.     Cervical Cancer  Your health care provider may recommend that you be screened regularly for cancer of the pelvic organs (ovaries, uterus, and vagina). This screening involves a pelvic examination, including  checking for microscopic changes to the surface of your cervix (Pap test). You may be encouraged to have this screening done every 3 years, beginning at age 21.  For women ages 30-65, health care providers may recommend pelvic exams and Pap testing every 3 years, or they may recommend the Pap and pelvic exam, combined with testing for human papilloma virus (HPV), every 5 years. Some types of HPV increase your risk of cervical cancer. Testing for HPV may also be done on women of any age with unclear Pap test results.  Other health care providers may not recommend any screening for nonpregnant women who are considered low risk for pelvic cancer and who do not have symptoms. Ask your health care provider if a screening pelvic exam is right for you.  If you have had past treatment for cervical cancer or a condition that could lead to cancer, you need Pap tests and screening for cancer for at least 20 years after your treatment. If Pap tests have been discontinued, your risk factors (such as having a new sexual partner) need to be reassessed to determine if screening should resume. Some women have medical problems that increase the chance of getting cervical cancer. In these cases, your health care provider may recommend more frequent screening and Pap tests.     Colorectal Cancer  This type of cancer can be detected and often prevented.  Routine colorectal cancer screening usually begins at 50 years of age and continues through 75 years of age.  Your health care provider may recommend screening at an earlier age if you have risk factors for colon cancer.  Your health care provider may also recommend using home test kits to check for hidden blood in the stool.  A small camera at the end of a tube can be used to examine your colon directly (sigmoidoscopy or colonoscopy). This is done to check for the earliest forms of colorectal cancer.  Routine screening usually begins at age 50.  Direct examination of the colon should  be repeated every 5-10 years through 75 years of age. However, you may need to be screened more often if early forms of precancerous polyps or small growths are found.     Skin Cancer  Check your skin from head to toe regularly.  Tell your health care provider about any new moles or changes in moles, especially if there is a change in a mole's shape or color.  Also tell your health care provider if you have a mole that is larger than the size of a pencil eraser.  Always use sunscreen. Apply sunscreen liberally and repeatedly throughout the day.  Protect yourself by wearing long sleeves, pants, a wide-brimmed hat, and sunglasses whenever you are outside.     Heart disease, diabetes, and high blood pressure  High blood pressure causes heart disease and increases the risk of stroke. High blood pressure is more likely to develop in:  People who have blood pressure in the high end of the normal range (130-139/85-89 mm Hg).  People who are overweight or obese.  People who are .  If you are 18-39 years of age, have your blood pressure checked every 3-5 years. If you are 40 years of age or older, have your blood pressure checked every year. You should have your blood pressure measured twice--once when you are at a hospital or clinic, and once when you are not at a hospital or clinic. Record the average of the two measurements. To check your blood pressure when you are not at a hospital or clinic, you can use:  An automated blood pressure machine at a pharmacy.  A home blood pressure monitor.  If you are between 55 years and 79 years old, ask your health care provider if you should take aspirin to prevent strokes.  Have regular diabetes screenings. This involves taking a blood sample to check your fasting blood sugar level.  If you are at a normal weight and have a low risk for diabetes, have this test once every three years after 45 years of age.  If you are overweight and have a high risk for diabetes,  consider being tested at a younger age or more often.  Preventing infection  Hepatitis B  If you have a higher risk for hepatitis B, you should be screened for this virus. You are considered at high risk for hepatitis B if:  You were born in a country where hepatitis B is common. Ask your health care provider which countries are considered high risk.  Your parents were born in a high-risk country, and you have not been immunized against hepatitis B (hepatitis B vaccine).  You have HIV or AIDS.  You use needles to inject street drugs.  You live with someone who has hepatitis B.  You have had sex with someone who has hepatitis B.  You get hemodialysis treatment.  You take certain medicines for conditions, including cancer, organ transplantation, and autoimmune conditions.     Hepatitis C  Blood testing is recommended for:  Everyone born from 1945 through 1965.  Anyone with known risk factors for hepatitis C.     Sexually transmitted infections (STIs)  You should be screened for sexually transmitted infections (STIs) including gonorrhea and chlamydia if:  You are sexually active and are younger than 24 years of age.  You are older than 24 years of age and your health care provider tells you that you are at risk for this type of infection.  Your sexual activity has changed since you were last screened and you are at an increased risk for chlamydia or gonorrhea. Ask your health care provider if you are at risk.  If you do not have HIV, but are at risk, it may be recommended that you take a prescription medicine daily to prevent HIV infection. This is called pre-exposure prophylaxis (PrEP). You are considered at risk if:  You are sexually active and do not regularly use condoms or know the HIV status of your partner(s).  You take drugs by injection.  You are sexually active with a partner who has HIV.     Talk with your health care provider about whether you are at high risk of being infected with HIV. If you choose to  begin PrEP, you should first be tested for HIV. You should then be tested every 3 months for as long as you are taking PrEP.  Pregnancy  If you are premenopausal and you may become pregnant, ask your health care provider about preconception counseling.  If you may become pregnant, take 400 to 800 micrograms (mcg) of folic acid every day.  If you want to prevent pregnancy, talk to your health care provider about birth control (contraception).  Osteoporosis and menopause  Osteoporosis is a disease in which the bones lose minerals and strength with aging. This can result in serious bone fractures. Your risk for osteoporosis can be identified using a bone density scan.  If you are 65 years of age or older, or if you are at risk for osteoporosis and fractures, ask your health care provider if you should be screened.  Ask your health care provider whether you should take a calcium or vitamin D supplement to lower your risk for osteoporosis.  Menopause may have certain physical symptoms and risks.  Hormone replacement therapy may reduce some of these symptoms and risks.  Talk to your health care provider about whether hormone replacement therapy is right for you.  Follow these instructions at home:  Schedule regular health, dental, and eye exams.  Stay current with your immunizations.  Do not use any tobacco products including cigarettes, chewing tobacco, or electronic cigarettes.  If you are pregnant, do not drink alcohol.  If you are breastfeeding, limit how much and how often you drink alcohol.  Limit alcohol intake to no more than 1 drink per day for nonpregnant women. One drink equals 12 ounces of beer, 5 ounces of wine, or 1½ ounces of hard liquor.  Do not use street drugs.  Do not share needles.  Ask your health care provider for help if you need support or information about quitting drugs.  Tell your health care provider if you often feel depressed.  Tell your health care provider if you have ever been abused or do  not feel safe at home.  This information is not intended to replace advice given to you by your health care provider. Make sure you discuss any questions you have with your health care provider.  Document Released: 07/02/2012 Document Revised: 05/25/2017 Document Reviewed: 09/20/2016  [employer] Patient Education © 2018 [employer] Exercising to Stay Healthy  To become healthy and stay healthy, it is recommended that you do moderate-intensity and vigorous-intensity exercise. You can tell that you are exercising at a moderate intensity if your heart starts beating faster and you start breathing faster but can still hold a conversation. You can tell that you are exercising at a vigorous intensity if you are breathing much harder and faster and cannot hold a conversation while exercising.  Exercising regularly is important. It has many health benefits, such as:  Improving overall fitness, flexibility, and endurance.  Increasing bone density.  Helping with weight control.  Decreasing body fat.  Increasing muscle strength.  Reducing stress and tension.  Improving overall health.  How often should I exercise?  Choose an activity that you enjoy, and set realistic goals. Your health care provider can help you make an activity plan that works for you.  Exercise regularly as told by your health care provider. This may include:  Doing strength training two times a week, such as:  Lifting weights.  Using resistance bands.  Push-ups.  Sit-ups.  Yoga.  Doing a certain intensity of exercise for a given amount of time. Choose from these options:  A total of 150 minutes of moderate-intensity exercise every week.  A total of 75 minutes of vigorous-intensity exercise every week.  A mix of moderate-intensity and vigorous-intensity exercise every week.  Children, pregnant women, people who have not exercised regularly, people who are overweight, and older adults may need to talk with a health care provider about what  activities are safe to do. If you have a medical condition, be sure to talk with your health care provider before you start a new exercise program.  What are some exercise ideas?    Moderate-intensity exercise ideas include:  Walking 1 mile (1.6 km) in about 15 minutes.  Biking.  Hiking.  Golfing.  Dancing.  Water aerobics.  Vigorous-intensity exercise ideas include:  Walking 4.5 miles (7.2 km) or more in about 1 hour.  Jogging or running 5 miles (8 km) in about 1 hour.  Biking 10 miles (16.1 km) or more in about 1 hour.  Lap swimming.  Roller-skating or in-line skating.  Cross-country skiing.  Vigorous competitive sports, such as football, basketball, and soccer.  Jumping rope.  Aerobic dancing.  What are some everyday activities that can help me to get exercise?  Yard work, such as:  Pushing a .  Raking and bagging leaves.  Washing your car.  Pushing a stroller.  Shoveling snow.  Gardening.  Washing windows or floors.  How can I be more active in my day-to-day activities?  Use stairs instead of an elevator.  Take a walk during your lunch break.  If you drive, park your car farther away from your work or school.  If you take public transportation, get off one stop early and walk the rest of the way.  Stand up or walk around during all of your indoor phone calls.  Get up, stretch, and walk around every 30 minutes throughout the day.  Enjoy exercise with a friend. Support to continue exercising will help you keep a regular routine of activity.  What guidelines can I follow while exercising?  Before you start a new exercise program, talk with your health care provider.  Do not exercise so much that you hurt yourself, feel dizzy, or get very short of breath.  Wear comfortable clothes and wear shoes with good support.  Drink plenty of water while you exercise to prevent dehydration or heat stroke.  Work out until your breathing and your heartbeat get faster.  Where to find more information  [place] Department of  Health and Human Services: www.hhs.gov  Centers for Disease Control and Prevention (CDC): www.cdc.gov  Summary  Exercising regularly is important. It will improve your overall fitness, flexibility, and endurance.  Regular exercise also will improve your overall health. It can help you control your weight, reduce stress, and improve your bone density.  Do not exercise so much that you hurt yourself, feel dizzy, or get very short of breath.  Before you start a new exercise program, talk with your health care provider.  This information is not intended to replace advice given to you by your health care provider. Make sure you discuss any questions you have with your health care provider.  Document Revised: 11/30/2018 Document Reviewed: 11/08/2018  [employer] Patient Education © 2021 [employer]

## 2022-09-30 NOTE — ASSESSMENT & PLAN NOTE
Headaches are worsening.  Advised to keep a headache diary.  Counseled regarding lifestyle modifications.  Medication changes per orders.  Follow up with neurology as scheduled in November   Patient states that she has previously failed Maxalt.  Has strong family history of migraines and all siblings and parents.  Currently on Depakote 1000 mg for both mood stabilization and migraine prevention per her psychiatrist.  Does feel this is improved headaches.  Willing to trial sumatriptan hand today.  Will prescribe and provided handouts as well as discussed appropriate dosing.  Patient without history of hypertension, CAD, chest pain, stroke.  Also provided samples of Nurtec, to be used following day of sumatriptan not effective.

## 2022-09-30 NOTE — ASSESSMENT & PLAN NOTE
Currently endorses snoring and morning headache.  Will refer to sleep medicine for official sleep study.

## 2022-10-03 NOTE — TELEPHONE ENCOUNTER
"Meant to read:  \"Take one tablet at onset of headache. May repeat dose one time in 2 hours if headache not relieved. Do not take more than 2 tablets in 24 hours\"    Thanks"

## 2022-10-11 ENCOUNTER — OFFICE VISIT (OUTPATIENT)
Dept: ORTHOPEDIC SURGERY | Facility: CLINIC | Age: 47
End: 2022-10-11

## 2022-10-11 VITALS
HEIGHT: 68 IN | DIASTOLIC BLOOD PRESSURE: 62 MMHG | BODY MASS INDEX: 30.31 KG/M2 | WEIGHT: 200 LBS | SYSTOLIC BLOOD PRESSURE: 98 MMHG

## 2022-10-11 DIAGNOSIS — S93.402A SPRAIN OF LEFT ANKLE, UNSPECIFIED LIGAMENT, INITIAL ENCOUNTER: Primary | ICD-10-CM

## 2022-10-11 PROCEDURE — 99213 OFFICE O/P EST LOW 20 MIN: CPT | Performed by: ORTHOPAEDIC SURGERY

## 2022-10-11 NOTE — PROGRESS NOTES
[facility] [place] [facility] Office Visit     Office Visit       Date: 10/11/2022   Patient Name: Lucille Mejía  MRN: [id number]  YOB: 1975    Chief Complaint:   Chief Complaint   Patient presents with   • Left Ankle - Pain       Referring Physician: Jero Garcia APRN     History of Present Illness: Lucille Mejía is a 46 y.o. female who is here today for left ankle pain.  Had an inversion injury about 1 week ago.  Has been ambulating in tall walker boot with use of cane since that time.  Says symptoms have improved since that time.  Still having some pain with ambulation.  Reports suffering distal fibular fracture on the ipsilateral ankle November 2020 which was treated nonoperatively with conservative management including physical therapy.  Patient reports that she recovered fully after about a 5-month period from that injury.      Subjective     Review of Systems   Constitutional: Negative.  Negative for chills, fatigue and fever.   HENT: Negative.  Negative for congestion and dental problem.    Eyes: Positive for photophobia. Negative for blurred vision.   Respiratory: Negative.  Negative for shortness of breath.    Cardiovascular: Negative.  Negative for leg swelling.   Gastrointestinal: Negative.  Negative for abdominal pain.   Endocrine: Negative.  Negative for polyuria.   Genitourinary: Negative.  Negative for difficulty urinating.   Musculoskeletal: Positive for arthralgias.   Skin: Negative.    Allergic/Immunologic: Negative.    Neurological: Negative.    Hematological: Negative for adenopathy. Bruises/bleeds easily.   Psychiatric/Behavioral: Negative.  Negative for behavioral problems.        Past Medical History:   Past Medical History:   Diagnosis Date   • Ankle sprain 1997    Left ankle.  Fell down a flight of stairs, bad sprain.   • Bipolar 1 disorder (HCC)    • Cervical disc disorder    •  "Depression    • Fracture of ankle 2020    Left fibula   • Fracture, fibula Nov 2020   • Knee sprain 1998    Left knee   • Low back strain 2020    Pain in sciatic nerve   • Neuroma of foot 1996    Right foot, four cryosurgeries.   • Personal history of other medical treatment 12/2020    pt states she has \"histamine intolerance\"   • Scoliosis 2004    Possible   • Stress fracture 2020    Of left fibula   • Tennis elbow 2004    From playing piano, severe injury       Past Surgical History:   Past Surgical History:   Procedure Laterality Date   • FOOT SURGERY Right     Love's neuroma   • LIPOMA EXCISION         Family History:   Family History   Problem Relation Age of Onset   • Cancer Paternal Grandmother         post-menopausal   • Cancer Maternal Aunt         post-menopausal   • Cancer Paternal Aunt         post-menopausal       Social History:   Social History     Socioeconomic History   • Marital status:    Tobacco Use   • Smoking status: Never   • Smokeless tobacco: Never   Vaping Use   • Vaping Use: Never used   Substance and Sexual Activity   • Alcohol use: No   • Drug use: Never   • Sexual activity: Yes     Partners: Male     Comment: None       Medications:   Current Outpatient Medications:   •  diphenhydrAMINE (BENADRYL) 25 MG tablet, Take 25 mg by mouth Every 6 (Six) Hours As Needed for Itching., Disp: , Rfl:   •  divalproex (DEPAKOTE ER) 250 MG 24 hr tablet, Take 250-500 mg by mouth Daily., Disp: , Rfl:   •  EPINEPHRINE HCL IJ, Inject  as directed., Disp: , Rfl:   •  escitalopram (LEXAPRO) 20 MG tablet, Take  by mouth., Disp: , Rfl:   •  Ketotifen Fumarate powder, , Disp: , Rfl:   •  lithium (LITHOBID) 300 MG CR tablet, , Disp: , Rfl:   •  NALTREXONE HCL PO, Take 0.25 mg by mouth Daily., Disp: , Rfl:   •  Rimegepant Sulfate (NURTEC) 75 MG tablet dispersible tablet, Take 1 tablet by mouth 1 (One) Time As Needed (headache) for up to 1 dose., Disp: 2 tablet, Rfl: 0  •  SUMAtriptan (IMITREX) 50 MG " "tablet, Take 1 tablet by mouth Every 2 (Two) Hours As Needed for Migraine for up to 10 doses. Take one tablet at onset of headache. May repeat dose one time in 2 hours if headache not relieved., Disp: 10 tablet, Rfl: 0  •  Vraylar 1.5 MG capsule capsule, , Disp: , Rfl:     Allergies:   Allergies   Allergen Reactions   • Amoxicillin-Pot Clavulanate GI Intolerance   • Dexamethasone Mental Status Change     Manic episodes   • Sulfa Antibiotics Hives       The following portions of the patient's history were reviewed and updated as appropriate: allergies, current medications, past family history, past medical history, past social history, past surgical history and problem list.    Objective      Vital Signs:   Vitals:    10/11/22 1250   BP: 98/62   Weight: 90.7 kg (200 lb)   Height: 172.7 cm (67.99\")          Ortho Exam:  left LE Foot and Ankle Exam:   Plantigrade foot.   Neurological exam of the superficial peroneal, deep peroneal, plantar, sural and saphenous nerves demonstrates intact sensation and normal motor function.   Peripheral pulses including posterior tibial artery and deep peroneal artery are intact and palpable. There is good perfusion to the toes.   The skin is intact throughout the foot and ankle without ulceration.   Range of motion of ankle, subtalar joint, midfoot and toes is within normal limits.   There is tenderness to palpation most pronounced lateral ankle.  Tenderness most pronounced over the ATFL and CFL.  Some tenderness over the deltoid.  No tenderness palpation about the proximal fibula.  Minimal swelling.  No ecchymosis.    Results Review:   XR Ankle 3+ View Left  Narrative: DATE OF EXAM:  10/6/2022 3:28 PM     PROCEDURE:  XR ANKLE 3+ VW LEFT-     INDICATIONS:  Trauma, left ankle pain.     COMPARISON:  3 2021.     TECHNIQUE:   A minimum of three radiologic views of the ankle were obtained.     FINDINGS:  There is soft tissue swelling over the lateral malleolus. There is no  acute fracture " or dislocation. There is deformity of the tip of the  lateral malleolus secondary to an old healed fracture. The ankle mortise  and visualized joint spaces are intact. There are no osseous lesions.      Impression:    1. Soft tissue swelling over the lateral malleolus.  2. No acute fracture or dislocation.  3. There is deformity the tip of the lateral malleolus secondary to an  old healed fracture.     This report was finalized on 10/6/2022 3:35 PM by Arnav Ugalde MD.     Left ankle x-rays at outside facility, 3 view, from October 6, no acute fracture dislocation, agree that previous healed deformity of the tip of the lateral malleolus is redemonstrated with no acute changes      Assessment / Plan      Assessment/Plan:   Diagnoses and all orders for this visit:    1. Sprain of left ankle, unspecified ligament, initial encounter (Primary)  -     Ambulatory Referral to Physical Therapy Evaluate and treat, Ortho        Discussed pathology with patient as well as treatment options at length.  Patient suffered inversion injury and has now suffered lateral ankle sprain.  Patient currently ambulating with antalgic gait in tall cam boot with assistance of cane.  Patient provided with a lace up ankle brace in clinic today.  Recommend patient wean out of boot and into ankle brace immediately with use of cane.  And wean off of cane over the next several days.  Prescription provided for physical therapy which patient can start within the next week.  Recommend patient wean out of ankle brace over the next couple of weeks during normal activities and only wear it during more strenuous activity activity.     Follow Up:   Return in about 8 weeks (around 12/6/2022), or if symptoms worsen or fail to improve.        Miles Singh MD  [facility] [place] [facility]

## 2022-10-11 NOTE — PATIENT INSTRUCTIONS
Ankle Sprain   What is an ankle sprain?  Ankle sprains are one of the most common musculoskeletal or orthopedic injuries  Ankle sprains are the most common reason for missed athletic participation  Ankle sprains can happen to anybody but are common in competitive athletes    Multiple injury mechanisms are possible but the most common is known as an inversion ankle sprain which is pictured below.   Inversion means that the foot turns inward, and the patients rolls over the outer portion of the ankle with their body weight   During a sprain, the ankle ligaments are stretched beyond their limits and can strain or tear. Sometimes the ligaments can be pulled off from the bony attachment   It is possible to have a small fracture or break in the very tip of the fibula bone as the result of an ankle sprain. This is really not the same as an ankle fracture and it can be treated the same way as other ankle sprains. Small breaks can occur elsewhere as well.   It is common to have swelling and bruising after an ankle sprain  Most patients will have pain and difficulty bearing weight immediately after the injury          Ankle sprains can range in severity from low-grade (mild) to high-grade (severe) depending on the amount of soft tissue damage. High-grade injuries will have more swelling and bruising.    What are the ankle ligaments?  In general, ligaments are fibers of strong connective tissue that connect bones to bones. They are usually located around joints and provide stability while still allowing the joint to move.   There are two ligaments on the outside of the ankle that are commonly injured during an inversion ankle sprain. The anterior talofibular ligament (ATFL) attaches the fibula bone to the ankle bone (talus). The calcaneofibular ligament (CFL) attaches the fibula bone to the heel bone (calcaneus). When these ligaments are injured (>90% of the time) it is considered a low ankle sprain. High ankle sprains are far  less common and not the topic of this material.   Sometimes a broad large ligament on the inside of the ankle is also involved. This is the deltoid ligament, and it attaches the tibia to the ankle bone (talus).           There are some patients with foot structure differences that place the ankle ligaments at increased risk for stress and strain. This may be a consideration when selecting an appropriate treatment plan.     What is the treatment for an ankle sprain?  Ankle sprains are diagnosed based on exam and history. X-rays are commonly obtained in order to rule out an ankle fracture. Magnetic resonance imaging (MRI) is NOT used acutely for diagnosis. This study rarely influences management strategy following an ankle sprain.   Nearly all isolated low ankle sprains are treated without surgery. This is regardless of the sprain severity (low-grade versus high-grade). The overwhelming majority of these patients regain functional ankle stability without pain using a structured nonsurgical treatment program. Return to full activity can take 4-8 weeks, on average.    Early weight-bearing is safe and encouraged following an ankle sprain. Some patients may be too painful and tentative early on and that is okay. Other patients may be able to get back onto their feet fairly quickly.   The treatment for an ankle sprain can be  into three phases. Patients will advance through these phases at varying rates.   Phase 1: swelling and pain control. Sometimes temporary immobilization is necessary to rest the soft tissues and improve symptoms. Immobilization usually means a walking boot or an ankle brace. It would be rare for a patient to need a cast and this is not preferred. Patients are allowed to bear as much weight as they can tolerate. Crutches or other walking aides like a knee scooter may be necessary at first. Swelling can be painful and can prolong the progression to the next phase. For this reason, a compressive  stocking or wrap is advised. Ice, elevation and anti-inflammatory medications are also routinely utilized. Ankle motion helps decrease swelling and prevent stiffness. Range of motion exercises are encouraged early on. An easy way to do this is by tracing the alphabet with the toes of the injured side. This is called the “ankle alphabet” and it is pictured below. Water therapy or pool exercises are an effective way to get the ankle joint moving and the muscles firing with less load and impact. This may be a good strategy for a patient that is             still having pain and struggling to bear full weight.    Phase 2: gait normalization, strengthening and balance retraining. Phase 2 can be started after a patient gains the ability to weight bear fully without crutches or walking aides. This may take only a couple of days for low-grade sprains or as long as 7-10 days for high-grade sprains. It is important to think about these phases blending together rather than occurring in isolation. Range of motion and swelling control will still need to be areas of focus throughout this phase of recovery. Resistance strengthening can be accomplished using elastic bands, pictured below. Re-establishing balance and a sense of trust in the injured ankle is really important. This is called proprioception training. Something as simple as single leg stance on the injured ankle while closing the eyes or focusing the eyes elsewhere can be effective. Balancing in this position until the lower leg and foot muscles get fatigued is a way to build endurance in the dynamic stabilizers of the ankle joint. There are many other higher-level balancing exercises that can be worked towards. Some patients may have trouble following a recovery program at home on their own. Formal guidance by a physical therapist may be necessary. For phase 2, a patient should no longer be using a walking boot. It is okay and sometimes helpful to use a lace-up      ankle brace for activities outside the home, such as errands.           Phase 3: agility training, return to sport and return to work. Phase 3 is a progression of phase 2 and there should be a sustained emphasis on strengthening and balance exercises. Higher impact, straight-line, activities such as power walking and jogging are now introduced. When these activities can be done with good technique and without pain or dysfunction, then more technical movements and movement patterns are incorporated. In general, it is the side-to-side cutting and pivoting movements that place the ankle at greatest risk for repeat sprain. These activities are the final component to an ankle rehabilitation program and should not be attempted until adequate strength and balance have been regained in earlier phases. This is called agility training and many patients will need to be able to comfortably perform these types of movements in order to return to sport or work. Sometimes the expertise of a physical therapist may be necessary to build a sport-specific or job-specific ankle rehabilitation program. It is okay for a patient to use a lace-up ankle brace or ankle taping as they return to competitive athletics or work. The goal is that they will ultimately be able to wean from using such devices as they continue to build strength and balance in the ankle over time.     What are the outcomes following an ankle sprain?  With nonsurgical treatment, described above, most patients regain full function and no pain after an ankle sprain.   It usually takes 4-8 weeks for full recovery although it may take longer with high-grade injuries.   It is important not to test or strain the ankle ligaments before they have had a chance to heal. This can happen if a patient returns to work, sports, or other high-demand activities too soon. This is the purpose of a controlled 3-phase treatment program.   Repeat ankle sprains can occur. This is a risk  with improper or incomplete rehabilitation.   Frequent ankle sprains, occurring with such regularity that it causes dysfunction in day-to-day activities, is called chronic ankle instability. This is a different problem and may require different treatment, including surgery.   If symptoms are not improving after an acute ankle sprain, despite 6-8 weeks of an appropriate ankle rehabilitation program, then a repeat clinic visit is warranted. An ankle MRI may be considered at this stage to assess for an explanation.     Resources  Some images were obtained from the American Academy of Orthopedic Surgery's patient education resource called OrthoInfo. Additional information can be found at www.orthoinfo.org. Search “ankle sprain”.   Diamond H, Ana M K, Natasha M, Anat T, Sejal LONG. Surgical versus functional treatment for acute ruptures of the lateral ligament complex of the ankle in young men: a randomized controlled trial. J Bone Joint Surg Am. 2010 Oct 20;92(14):2367-74.  Alexandre W, Gena IV, Russell AG, Ellermann A, Nicole C, Scott GP, Best R. Treatment of acute ankle ligament injuries: a systematic review. Arch Orthop Trauma Surg. 2013 Aug;133(8):1129-41.  Physical Therapy After an Ankle Sprain: Using the Evidence to Guide Physical Therapist Practice. J Orthop Sports Phys Ther. 2021 Apr;51(4):159-160.

## 2022-10-28 ENCOUNTER — TELEPHONE (OUTPATIENT)
Dept: ORTHOPEDIC SURGERY | Facility: CLINIC | Age: 47
End: 2022-10-28

## 2022-11-28 ENCOUNTER — OFFICE VISIT (OUTPATIENT)
Dept: NEUROLOGY | Facility: CLINIC | Age: 47
End: 2022-11-28

## 2022-11-28 VITALS
HEART RATE: 88 BPM | BODY MASS INDEX: 31.03 KG/M2 | SYSTOLIC BLOOD PRESSURE: 122 MMHG | OXYGEN SATURATION: 98 % | WEIGHT: 204 LBS | DIASTOLIC BLOOD PRESSURE: 74 MMHG

## 2022-11-28 DIAGNOSIS — G43.009 MIGRAINE WITHOUT AURA AND WITHOUT STATUS MIGRAINOSUS, NOT INTRACTABLE: Primary | ICD-10-CM

## 2022-11-28 PROCEDURE — 99204 OFFICE O/P NEW MOD 45 MIN: CPT | Performed by: PSYCHIATRY & NEUROLOGY

## 2022-11-28 RX ORDER — SUMATRIPTAN 100 MG/1
TABLET, FILM COATED ORAL
Qty: 12 TABLET | Refills: 6 | Status: SHIPPED | OUTPATIENT
Start: 2022-11-28

## 2022-11-28 NOTE — PROGRESS NOTES
"Subjective   Patient ID: Lucille Mejía is a 46 y.o. female     Chief Complaint   Patient presents with   • Migraine        History of Present Illness    46 y.o. female self referred for migraines.     Three HA days a month.  Cluster together.      Holocranial location.      Quality is throbbing.  Intensity is moderate to severe.    Imitrex and Cephaly.      Increased Depakote ER 1000 mg daily 6 weeks ago.      Reviewed medical records:    Migraines can last hours to days.  Depakote for BPAD and migraines.      Depakote effective in past.      Started in 2015.  Quality is pressure, stabbing, throbbing.     5 - 6 x month.      Assoc sx of photo/phono.      Past Medical History:   Diagnosis Date   • Ankle sprain 1997    Left ankle.  Fell down a flight of stairs, bad sprain.   • Bipolar 1 disorder (HCC)    • Cervical disc disorder    • Depression    • Fracture of ankle 2020    Left fibula   • Fracture, fibula Nov 2020   • Knee sprain 1998    Left knee   • Low back strain 2020    Pain in sciatic nerve   • Neuroma of foot 1996    Right foot, four cryosurgeries.   • Personal history of other medical treatment 12/2020    pt states she has \"histamine intolerance\"   • Scoliosis 2004    Possible   • Stress fracture 2020    Of left fibula   • Tennis elbow 2004    From playing piano, severe injury     Family History   Problem Relation Age of Onset   • Cancer Paternal Grandmother         post-menopausal   • Cancer Maternal Aunt         post-menopausal   • Cancer Paternal Aunt         post-menopausal     Social History     Socioeconomic History   • Marital status:    Tobacco Use   • Smoking status: Never   • Smokeless tobacco: Never   Vaping Use   • Vaping Use: Never used   Substance and Sexual Activity   • Alcohol use: No   • Drug use: Never   • Sexual activity: Yes     Partners: Male     Comment: None       Review of Systems   Constitutional: Negative for activity change, fatigue and unexpected weight change. "   HENT: Negative for tinnitus and trouble swallowing.    Eyes: Positive for photophobia. Negative for visual disturbance.   Respiratory: Negative for apnea, cough and choking.    Cardiovascular: Negative for leg swelling.   Gastrointestinal: Negative for nausea and vomiting.   Endocrine: Negative for cold intolerance and heat intolerance.   Genitourinary: Negative for difficulty urinating, frequency, menstrual problem and urgency.   Musculoskeletal: Negative for back pain, gait problem, myalgias and neck pain.   Skin: Negative for color change and rash.   Allergic/Immunologic: Negative for immunocompromised state.   Neurological: Positive for headaches. Negative for dizziness, tremors, seizures, syncope, facial asymmetry, speech difficulty, weakness, light-headedness and numbness.   Hematological: Negative for adenopathy. Does not bruise/bleed easily.   Psychiatric/Behavioral: Negative for behavioral problems, confusion, decreased concentration, hallucinations and sleep disturbance.       Objective     Vitals:    11/28/22 1339   BP: 122/74   Pulse: 88   SpO2: 98%   Weight: 92.5 kg (204 lb)       Neurologic Exam     Mental Status   Oriented to person, place, and time.   Speech: speech is normal   Level of consciousness: alert  Knowledge: good and consistent with education.   Normal comprehension.     Cranial Nerves   Cranial nerves II through XII intact.     CN II   Visual fields full to confrontation.   Visual acuity: normal  Right visual field deficit: none  Left visual field deficit: none     CN III, IV, VI   Pupils are equal, round, and reactive to light.  Extraocular motions are normal.   Nystagmus: none   Diplopia: none  Ophthalmoparesis: none  Upgaze: normal  Downgaze: normal  Conjugate gaze: present    CN V   Facial sensation intact.   Right corneal reflex: normal  Left corneal reflex: normal    CN VII   Right facial weakness: none  Left facial weakness: none    CN VIII   Hearing: intact    CN IX, X   Palate:  symmetric  Right gag reflex: normal  Left gag reflex: normal    CN XI   Right sternocleidomastoid strength: normal  Left sternocleidomastoid strength: normal    CN XII   Tongue: not atrophic  Fasciculations: absent  Tongue deviation: none    Motor Exam   Muscle bulk: normal  Overall muscle tone: normal    Strength   Strength 5/5 throughout.     Sensory Exam   Light touch normal.     Gait, Coordination, and Reflexes     Gait  Gait: normal    Tremor   Resting tremor: absent  Intention tremor: absent  Action tremor: absent    Reflexes   Reflexes 2+ except as noted.       Physical Exam  Eyes:      Extraocular Movements: EOM normal.      Pupils: Pupils are equal, round, and reactive to light.   Neurological:      Mental Status: She is oriented to person, place, and time.      Cranial Nerves: Cranial nerves 2-12 are intact.      Motor: Motor strength is normal.      Gait: Gait is intact.   Psychiatric:         Speech: Speech normal.         Admission on 08/31/2022, Discharged on 08/31/2022   Component Date Value Ref Range Status   • Extra Tube 08/31/2022 Hold for add-ons.   Final    Auto resulted.   • Extra Tube 08/31/2022 hold for add-on   Final    Auto resulted   • Extra Tube 08/31/2022 Hold for add-ons.   Final    Auto resulted.   • Extra Tube 08/31/2022 Hold for add-ons.   Final    Auto resulted.   • Extra Tube 08/31/2022 Hold for add-ons.   Final    Auto resulted         Assessment & Plan     Problem List Items Addressed This Visit        Neuro    Migraine without aura and without status migrainosus, not intractable - Primary (Chronic)    Overview     Started age 36. Worsened over past 3 years. Triggers thought to be stress and allergies. 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  Medication changes per orders.     Increase Imitrex 100 mg                  Relevant Medications    escitalopram (LEXAPRO) 20  MG tablet    divalproex (DEPAKOTE ER) 250 MG 24 hr tablet    Rimegepant Sulfate (NURTEC) 75 MG tablet dispersible tablet    SUMAtriptan (IMITREX) 100 MG tablet          No follow-ups on file.

## 2022-12-13 ENCOUNTER — OFFICE VISIT (OUTPATIENT)
Dept: SLEEP MEDICINE | Facility: HOSPITAL | Age: 47
End: 2022-12-13

## 2022-12-13 VITALS
HEART RATE: 92 BPM | DIASTOLIC BLOOD PRESSURE: 63 MMHG | OXYGEN SATURATION: 99 % | HEIGHT: 68 IN | WEIGHT: 209.8 LBS | SYSTOLIC BLOOD PRESSURE: 108 MMHG | BODY MASS INDEX: 31.8 KG/M2

## 2022-12-13 DIAGNOSIS — G47.19 EXCESSIVE DAYTIME SLEEPINESS: Primary | ICD-10-CM

## 2022-12-13 DIAGNOSIS — R06.83 SNORING: ICD-10-CM

## 2022-12-13 DIAGNOSIS — G47.33 OBSTRUCTIVE SLEEP APNEA, ADULT: ICD-10-CM

## 2022-12-13 PROCEDURE — 99204 OFFICE O/P NEW MOD 45 MIN: CPT | Performed by: INTERNAL MEDICINE

## 2022-12-13 NOTE — PROGRESS NOTES
"  Lucille Mejía is a 46 y.o. female.   Chief Complaint   Patient presents with   • Sleeping Problem       HPI     46 y.o. female seen in consultation at the request of Irineo Hernandez MD for evaluation of the above.     She is here primarily due to excessive daytime somnolence.    She has a past medical history significant for allergic rhinitis, dyslipidemia, vitamin D deficiency, bipolar 1 on Vraylar and lithium, mast cell activation syndrome, and fibromyalgia.    She sleeps an average of 10 hours per night.  She typically is in bed from 1 AM until 11:30 AM.  She will then often take a nap during the afternoon.  Her  notes that she is excessively somnolent.    He has noted snoring but has not noted specific apneas.  He does not note consistent snoring and only intermittent.    She does admit to morning headaches but attributes this to bruxism.  She does awaken with a dry mouth in the morning.    She denies any RLS type symptoms.  She has no parasomnias other than the bruxism.  She notes no nocturnal hallucinations or sleep paralysis.    She has a history of a home sleep apnea test at  in 2019.  Her home study was described to her as \"inconclusive\" and suggestive of mild JAVI.  A polysomnogram was recommended but never accomplished due to the pandemic.    [place] Scale is: 9/24    The patient's relevant past medical, surgical, family, and social history reviewed and updated in Epic as appropriate.    Current medications are:   Current Outpatient Medications:   •  diphenhydrAMINE (BENADRYL) 25 MG tablet, Take 25 mg by mouth Every 6 (Six) Hours As Needed for Itching., Disp: , Rfl:   •  divalproex (DEPAKOTE ER) 250 MG 24 hr tablet, Take 250-500 mg by mouth Daily., Disp: , Rfl:   •  EPINEPHRINE HCL IJ, Inject  as directed., Disp: , Rfl:   •  escitalopram (LEXAPRO) 20 MG tablet, Take  by mouth., Disp: , Rfl:   •  Ketotifen Fumarate powder, , Disp: , Rfl:   •  lithium (LITHOBID) 300 MG CR tablet, , " "Disp: , Rfl:   •  NALTREXONE HCL PO, Take 0.25 mg by mouth Daily., Disp: , Rfl:   •  Rimegepant Sulfate (NURTEC) 75 MG tablet dispersible tablet, Take 1 tablet by mouth 1 (One) Time As Needed (headache) for up to 1 dose., Disp: 2 tablet, Rfl: 0  •  SUMAtriptan (IMITREX) 100 MG tablet, Take one tablet at onset of headache. May repeat dose one time in 2 hours if headache not relieved., Disp: 12 tablet, Rfl: 6  •  Vraylar 1.5 MG capsule capsule, , Disp: , Rfl: .    Review of Systems    Review of Systems  ROS documented in patient questionnaire ×14 systems.  Reviewed with patient.  Otherwise negative except as noted in HPI.    Physical Exam    Blood pressure 108/63, pulse 92, height 171.5 cm (67.5\"), weight 95.2 kg (209 lb 12.8 oz), SpO2 99 %. Body mass index is 32.37 kg/m².    Physical Exam  Vitals and nursing note reviewed.   Constitutional:       Appearance: Normal appearance. She is well-developed.   HENT:      Head: Normocephalic and atraumatic.      Nose: Nose normal.      Mouth/Throat:      Mouth: Mucous membranes are moist.      Pharynx: Oropharynx is clear. No oropharyngeal exudate.      Comments: Class III airway  Eyes:      General: No scleral icterus.     Conjunctiva/sclera: Conjunctivae normal.   Neck:      Thyroid: No thyromegaly.      Trachea: No tracheal deviation.   Cardiovascular:      Rate and Rhythm: Normal rate and regular rhythm.      Heart sounds: No murmur heard.    No friction rub. No gallop.   Pulmonary:      Effort: Pulmonary effort is normal. No respiratory distress.      Breath sounds: No wheezing or rales.   Musculoskeletal:         General: No deformity. Normal range of motion.   Skin:     General: Skin is warm and dry.      Findings: No rash.   Neurological:      Mental Status: She is alert and oriented to person, place, and time.   Psychiatric:         Behavior: Behavior normal.         Thought Content: Thought content normal.         DATA:    Reviewed 9/23/2022 note from " "Mary    Reviewed 8/12/2022 labs including bicarbonate 22 and hemoglobin 13.1    ASSESSMENT:    Problem List Items Addressed This Visit    None  Visit Diagnoses     Excessive daytime sleepiness    -  Primary    Relevant Orders    Home Sleep Study    Snoring        Relevant Orders    Home Sleep Study    Obstructive sleep apnea, adult        Relevant Orders    Home Sleep Study          46-year-old female with a longstanding history of excessive daytime somnolence despite large amounts of sleep.  She either had a home sleep study or an overnight oximetry at  in 2019 that was \"inconclusive\" and a PSG was recommended but she never completed this due to the pandemic.  She has had continued symptoms.  Her  notes snoring and daytime somnolence.  She does have morning headaches and bruxism as well with a dry mouth at night.  Her body habitus is at increased risk for JAVI based upon her BMI of 32 and class III airway.  She should have a further evaluation for obstructive sleep apnea with a home sleep apnea test and then consideration of treatment based upon the results.    PLAN:    1. Home sleep apnea testing is an appropriate initial diagnostic step in her case.  2. I discussed the diagnostic process as well as treatment options for obstructive sleep apnea if that is diagnosed.  3. I went over the long-term cardiovascular and metabolic risks of untreated obstructive sleep apnea.  4. I recommended long-term, healthy weight loss.  5. The patient was amenable to a trial of CPAP therapy if deemed appropriate after testing complete.  6. Close sleep center follow-up.      I have reviewed the results of my evaluation and impression and discussed my recommendations in detail with the patient.    Level of Risk Moderate due to: undiagnosed new problem    Moderate risk of morbidity due to the possibility of untreated sleep apnea.    Signed by  Andres Callahan MD    December 13, 2022      CC: Irineo Hernandez MD         "  Irineo Hernandez MD

## 2023-01-06 ENCOUNTER — TELEPHONE (OUTPATIENT)
Dept: INTERNAL MEDICINE | Facility: CLINIC | Age: 48
End: 2023-01-06

## 2023-02-03 ENCOUNTER — OFFICE VISIT (OUTPATIENT)
Dept: INTERNAL MEDICINE | Facility: CLINIC | Age: 48
End: 2023-02-03
Payer: COMMERCIAL

## 2023-02-03 VITALS
SYSTOLIC BLOOD PRESSURE: 130 MMHG | DIASTOLIC BLOOD PRESSURE: 80 MMHG | WEIGHT: 208.13 LBS | TEMPERATURE: 98.4 F | HEART RATE: 89 BPM | OXYGEN SATURATION: 98 % | BODY MASS INDEX: 32.12 KG/M2 | RESPIRATION RATE: 20 BRPM

## 2023-02-03 DIAGNOSIS — G47.19 EXCESSIVE DAYTIME SLEEPINESS: ICD-10-CM

## 2023-02-03 DIAGNOSIS — Z12.12 SCREENING FOR COLORECTAL CANCER: Primary | ICD-10-CM

## 2023-02-03 DIAGNOSIS — G43.009 MIGRAINE WITHOUT AURA AND WITHOUT STATUS MIGRAINOSUS, NOT INTRACTABLE: Chronic | ICD-10-CM

## 2023-02-03 DIAGNOSIS — Z12.11 SCREENING FOR COLORECTAL CANCER: Primary | ICD-10-CM

## 2023-02-03 PROCEDURE — 99214 OFFICE O/P EST MOD 30 MIN: CPT | Performed by: STUDENT IN AN ORGANIZED HEALTH CARE EDUCATION/TRAINING PROGRAM

## 2023-02-03 RX ORDER — DIVALPROEX SODIUM 500 MG/1
TABLET, EXTENDED RELEASE ORAL
COMMUNITY
Start: 2022-12-15

## 2023-02-03 NOTE — PROGRESS NOTES
Follow Up Office Visit      Date: 2023   Patient Name: Lucille Mejía  : 1975   MRN: [id number]     Chief Complaint:    Chief Complaint   Patient presents with   • Follow-up     4 month        History of Present Illness: Lucille Mejía is a 47 y.o. female who is here today to follow up with our clinic for the following problems..    HPI    Headaches  The patient reports she is doing better. She admits the Imitrex really does help a lot. She states she has not had any more week long headaches. She admits she had a headache on Monday but she took the Imitrex and it has definitely helped. She states she is still doing the Depakote 1000mg for both the mood and migraines. She admits she is staying hydrated and sleeping good. She states she is getting migraines once every 2 weeks. She admits she has some light sensitivity that lingers. She states she feels like she is in REM sleep all the time and it makes it difficult for her to get up. She admits she feels like she is snapping right out of the deep sleep every time. She admits she is getting about 10 hours of sleep. She states she does not really want a colonoscopy and denies a family history of colon cancer. She admits her dad did have some polyps. She admits she is worried about the fasting for the colonoscopy stating when she doesn't eat she get panicky. She states she does not do the influenza vaccine. She admits she has received the COVID-19 vaccine and denies the booster shot.      Was seen by pulmonology for concern for possible annetta. Still yet to have sleep study performed. Continues to wake feeling fatigued.    Subjective      Review of Systems:   Review of Systems    I have reviewed the patients family history, social history, past medical history, past surgical history and have updated it as appropriate.     Medications:     Current Outpatient Medications:   •  diphenhydrAMINE (BENADRYL) 25 MG tablet, Take 25 mg by mouth  Every 6 (Six) Hours As Needed for Itching., Disp: , Rfl:   •  divalproex (DEPAKOTE ER) 250 MG 24 hr tablet, Take 250-500 mg by mouth Daily., Disp: , Rfl:   •  divalproex (DEPAKOTE ER) 500 MG 24 hr tablet, , Disp: , Rfl:   •  EPINEPHRINE HCL IJ, Inject  as directed., Disp: , Rfl:   •  escitalopram (LEXAPRO) 20 MG tablet, Take  by mouth., Disp: , Rfl:   •  Ketotifen Fumarate powder, , Disp: , Rfl:   •  lithium (LITHOBID) 300 MG CR tablet, , Disp: , Rfl:   •  NALTREXONE HCL PO, Take 0.25 mg by mouth Daily., Disp: , Rfl:   •  Rimegepant Sulfate (NURTEC) 75 MG tablet dispersible tablet, Take 1 tablet by mouth 1 (One) Time As Needed (headache) for up to 1 dose., Disp: 2 tablet, Rfl: 0  •  SUMAtriptan (IMITREX) 100 MG tablet, Take one tablet at onset of headache. May repeat dose one time in 2 hours if headache not relieved., Disp: 12 tablet, Rfl: 6  •  Vraylar 1.5 MG capsule capsule, , Disp: , Rfl:     Allergies:   Allergies   Allergen Reactions   • Amoxicillin-Pot Clavulanate GI Intolerance   • Dexamethasone Mental Status Change     Manic episodes   • Sulfa Antibiotics Hives       Objective     Physical Exam: Please see above  Vital Signs:   Vitals:    02/03/23 1518   BP: 130/80   BP Location: Left arm   Patient Position: Sitting   Cuff Size: Adult   Pulse: 89   Resp: 20   Temp: 98.4 °F (36.9 °C)   TempSrc: Temporal   SpO2: 98%   Weight: 94.4 kg (208 lb 2 oz)   PainSc: 0-No pain     Body mass index is 32.12 kg/m².  BMI is >= 30 and <35. (Class 1 Obesity). The following options were offered after discussion;: nutrition counseling/recommendations       Physical Exam  Vitals reviewed.   Constitutional:       General: She is not in acute distress.     Appearance: Normal appearance. She is obese. She is not ill-appearing or toxic-appearing.      Comments:      HENT:      Head: Normocephalic and atraumatic.      Right Ear: External ear normal.      Left Ear: External ear normal.   Eyes:      General: No scleral icterus.      Extraocular Movements: Extraocular movements intact.      Pupils: Pupils are equal, round, and reactive to light.   Cardiovascular:      Rate and Rhythm: Normal rate and regular rhythm.      Pulses: Normal pulses.      Heart sounds: No murmur heard.    No friction rub. No gallop.   Pulmonary:      Effort: Pulmonary effort is normal. No respiratory distress.      Breath sounds: Normal breath sounds. No stridor. No wheezing or rales.   Abdominal:      General: Abdomen is flat. There is no distension.   Musculoskeletal:         General: No swelling or tenderness. Normal range of motion.      Cervical back: Normal range of motion. No rigidity.   Skin:     General: Skin is warm and dry.      Capillary Refill: Capillary refill takes less than 2 seconds.      Findings: No erythema or rash.   Neurological:      General: No focal deficit present.      Mental Status: She is alert and oriented to person, place, and time.      Cranial Nerves: No cranial nerve deficit.      Sensory: No sensory deficit.      Motor: No weakness.      Gait: Gait normal.   Psychiatric:         Mood and Affect: Mood normal.         Behavior: Behavior normal.         Judgment: Judgment normal.         Procedures    Results:   Labs:   TSH   Date Value Ref Range Status   08/12/2022 3.320 0.270 - 4.200 uIU/mL Final        Imaging:   No valid procedures specified.     Assessment / Plan      Assessment/Plan:   Problem List Items Addressed This Visit        Neuro    Migraine without aura and without status migrainosus, not intractable (Chronic)    Overview     Started age 36. Worsened over past 3 years. Triggers thought to be stress and allergies. Takes Benadryl regularly for mast cell activation syndrome (following with [facility])         Current Assessment & Plan     Headaches are improving with treatment.  Continue current treatment regimen. Continue follow up with Neurology. Continue meds as above.             Relevant Medications    divalproex  (DEPAKOTE ER) 500 MG 24 hr tablet       Sleep    Excessive daytime sleepiness    Current Assessment & Plan     This is chronic, this is unchanged.   Encouraged follow up with sleep medicine and completion of sleep study.          Other Visit Diagnoses     Screening for colorectal cancer    -  Primary    Relevant Orders    Cologuard - Stool, Per Rectum          Follow Up:   Return in about 6 months (around 8/10/2023) for Annual.      Irineo Hernandez MD   [facility]    Transcribed from ambient dictation for Irineo Hernandez MD by Carisa Bullock.  02/03/23   17:50 EST    Patient or patient representative verbalized consent to the visit recording.  I have personally performed the services described in this document as transcribed by the above individual, and it is both accurate and complete.

## 2023-02-03 NOTE — TELEPHONE ENCOUNTER
I left a voice mail for patient  at [phone] reminding her to please reschedule her Mammogram and to please let me know her new appointment date  Voice mail ok per  Verbal

## 2023-02-03 NOTE — PATIENT INSTRUCTIONS
"Healthy Eating  Following a healthy eating pattern may help you to achieve and maintain a healthy body weight, reduce the risk of chronic disease, and live a long and productive life. It is important to follow a healthy eating pattern at an appropriate calorie level for your body. Your nutritional needs should be met primarily through food by choosing a variety of nutrient-rich foods.  What are tips for following this plan?  Reading food labels  Read labels and choose the following:  Reduced or low sodium.  Juices with 100% fruit juice.  Foods with low saturated fats and high polyunsaturated and monounsaturated fats.  Foods with whole grains, such as whole wheat, cracked wheat, brown rice, and wild rice.  Whole grains that are fortified with folic acid. This is recommended for women who are pregnant or who want to become pregnant.  Read labels and avoid the following:  Foods with a lot of added sugars. These include foods that contain brown sugar, corn sweetener, corn syrup, dextrose, fructose, glucose, high-fructose corn syrup, honey, invert sugar, lactose, malt syrup, maltose, molasses, raw sugar, sucrose, trehalose, or turbinado sugar.  Do not eat more than the following amounts of added sugar per day:  6 teaspoons (25 g) for women.  9 teaspoons (38 g) for men.  Foods that contain processed or refined starches and grains.  Refined grain products, such as white flour, degermed cornmeal, white bread, and white rice.  Shopping  Choose nutrient-rich snacks, such as vegetables, whole fruits, and nuts. Avoid high-calorie and high-sugar snacks, such as potato chips, fruit snacks, and candy.  Use oil-based dressings and spreads on foods instead of solid fats such as butter, stick margarine, or cream cheese.  Limit pre-made sauces, mixes, and \"instant\" products such as flavored rice, instant noodles, and ready-made pasta.  Try more plant-protein sources, such as tofu, tempeh, black beans, edamame, lentils, nuts, and " seeds.  Explore eating plans such as the Mediterranean diet or vegetarian diet.  Cooking  Use oil to sauté or stir-patel foods instead of solid fats such as butter, stick margarine, or lard.  Try baking, boiling, grilling, or broiling instead of frying.  Remove the fatty part of meats before cooking.  Steam vegetables in water or broth.  Meal planning    At meals, imagine dividing your plate into fourths:  One-half of your plate is fruits and vegetables.  One-fourth of your plate is whole grains.  One-fourth of your plate is protein, especially lean meats, poultry, eggs, tofu, beans, or nuts.  Include low-fat dairy as part of your daily diet.     Lifestyle  Choose healthy options in all settings, including home, work, school, restaurants, or stores.  Prepare your food safely:  Wash your hands after handling raw meats.  Keep food preparation surfaces clean by regularly washing with hot, soapy water.  Keep raw meats separate from ready-to-eat foods, such as fruits and vegetables.  , meat, poultry, and eggs to the recommended internal temperature.  Store foods at safe temperatures. In general:  Keep cold foods at 40°F (4.4°C) or below.  Keep hot foods at 140°F (60°C) or above.  Keep your freezer at 0°F (-17.8°C) or below.  Foods are no longer safe to eat when they have been between the temperatures of 40°-140°F (4.4-60°C) for more than 2 hours.  What foods should I eat?  Fruits  Aim to eat 2 cup-equivalents of fresh, canned (in natural juice), or frozen fruits each day. Examples of 1 cup-equivalent of fruit include 1 small apple, 8 large strawberries, 1 cup canned fruit, ½ cup dried fruit, or 1 cup 100% juice.  Vegetables  Aim to eat 2½-3 cup-equivalents of fresh and frozen vegetables each day, including different varieties and colors. Examples of 1 cup-equivalent of vegetables include 2 medium carrots, 2 cups raw, leafy greens, 1 cup chopped vegetable (raw or cooked), or 1 medium baked potato.  Grains  Aim  to eat 6 ounce-equivalents of whole grains each day. Examples of 1 ounce-equivalent of grains include 1 slice of bread, 1 cup ready-to-eat cereal, 3 cups popcorn, or ½ cup cooked rice, pasta, or cereal.  Meats and other proteins  Aim to eat 5-6 ounce-equivalents of protein each day. Examples of 1 ounce-equivalent of protein include 1 egg, 1/2 cup nuts or seeds, or 1 tablespoon (16 g) peanut butter. A cut of meat or fish that is the size of a deck of cards is about 3-4 ounce-equivalents.  Of the protein you eat each week, try to have at least 8 ounces come from seafood. This includes salmon, trout, herring, and anchovies.  Dairy  Aim to eat 3 cup-equivalents of fat-free or low-fat dairy each day. Examples of 1 cup-equivalent of dairy include 1 cup (240 mL) milk, 8 ounces (250 g) yogurt, 1½ ounces (44 g) natural cheese, or 1 cup (240 mL) fortified soy milk.  Fats and oils  Aim for about 5 teaspoons (21 g) per day. Choose monounsaturated fats, such as canola and olive oils, avocados, peanut butter, and most nuts, or polyunsaturated fats, such as sunflower, corn, and soybean oils, walnuts, pine nuts, sesame seeds, sunflower seeds, and flaxseed.  Beverages  Aim for six 8-oz glasses of water per day. Limit coffee to three to five 8-oz cups per day.  Limit caffeinated beverages that have added calories, such as soda and energy drinks.  Limit alcohol intake to no more than 1 drink a day for nonpregnant women and 2 drinks a day for men. One drink equals 12 oz of beer (355 mL), 5 oz of wine (148 mL), or 1½ oz of hard liquor (44 mL).  Seasoning and other foods  Avoid adding excess amounts of salt to your foods. Try flavoring foods with herbs and spices instead of salt.  Avoid adding sugar to foods.  Try using oil-based dressings, sauces, and spreads instead of solid fats.  This information is based on general [place] nutrition guidelines. For more information, visit choosemyplate.gov. Exact amounts may vary based on your  nutrition needs.  Summary  A healthy eating plan may help you to maintain a healthy weight, reduce the risk of chronic diseases, and stay active throughout your life.  Plan your meals. Make sure you eat the right portions of a variety of nutrient-rich foods.  Try baking, boiling, grilling, or broiling instead of frying.  Choose healthy options in all settings, including home, work, school, restaurants, or stores.  This information is not intended to replace advice given to you by your health care provider. Make sure you discuss any questions you have with your health care provider.  Document Revised: 04/01/2019 Document Reviewed: 04/01/2019  Elsevier Patient Education © 2021 [employer]

## 2023-02-06 NOTE — ASSESSMENT & PLAN NOTE
Headaches are improving with treatment.  Continue current treatment regimen. Continue follow up with Neurology. Continue meds as above.

## 2023-02-06 NOTE — ASSESSMENT & PLAN NOTE
This is chronic, this is unchanged.   Encouraged follow up with sleep medicine and completion of sleep study.

## 2023-06-01 ENCOUNTER — OFFICE VISIT (OUTPATIENT)
Dept: NEUROLOGY | Facility: CLINIC | Age: 48
End: 2023-06-01

## 2023-06-01 VITALS
OXYGEN SATURATION: 97 % | WEIGHT: 208.4 LBS | HEART RATE: 90 BPM | SYSTOLIC BLOOD PRESSURE: 112 MMHG | BODY MASS INDEX: 31.58 KG/M2 | HEIGHT: 68 IN | DIASTOLIC BLOOD PRESSURE: 68 MMHG

## 2023-06-01 DIAGNOSIS — G43.009 MIGRAINE WITHOUT AURA AND WITHOUT STATUS MIGRAINOSUS, NOT INTRACTABLE: Primary | Chronic | ICD-10-CM

## 2023-06-01 PROCEDURE — 99213 OFFICE O/P EST LOW 20 MIN: CPT | Performed by: PSYCHIATRY & NEUROLOGY

## 2023-06-01 RX ORDER — SUMATRIPTAN 100 MG/1
TABLET, FILM COATED ORAL
Qty: 36 TABLET | Refills: 3 | Status: SHIPPED | OUTPATIENT
Start: 2023-06-01

## 2023-06-01 RX ORDER — ALPRAZOLAM 0.5 MG/1
1 TABLET ORAL 3 TIMES DAILY
COMMUNITY
Start: 2023-03-01

## 2023-06-01 NOTE — PROGRESS NOTES
"Chief Complaint    Migraine    Subjective        [facility]     History of Present Illness    47 y.o. female returns in follow up.  Last visit on 11/28/22 continued Depakote, Nurtec, Imitrex.      Imitrex aborts in 30 minutes.      TwoHA days a month.     Holocranial location.       Quality is throbbing.  Intensity is moderate to severe.     Imitrex and Cephaly.       Depakote ER 1000 mg daily 6 weeks ago.       Reviewed medical records:     Migraines can last hours to days.  Depakote for BPAD and migraines.       Depakote effective in past.       Started in 2015.  Quality is pressure, stabbing, throbbing.      5 - 6 x month.       Assoc sx of photo/phono.      Objective   Vital Signs:  /68   Pulse 90   Ht 171.5 cm (67.5\")   Wt 94.5 kg (208 lb 6.4 oz)   SpO2 97%   BMI 32.16 kg/m²   Estimated body mass index is 32.16 kg/m² as calculated from the following:    Height as of this encounter: 171.5 cm (67.5\").    Weight as of this encounter: 94.5 kg (208 lb 6.4 oz).             Physical Exam  Eyes:      Extraocular Movements: EOM normal.      Pupils: Pupils are equal, round, and reactive to light.   Neurological:      Mental Status: She is oriented to person, place, and time.      Cranial Nerves: Cranial nerves 2-12 are intact.      Motor: Motor strength is normal.      Gait: Gait is intact.   Psychiatric:         Speech: Speech normal.          Neurologic Exam     Mental Status   Oriented to person, place, and time.   Speech: speech is normal   Level of consciousness: alert  Knowledge: good and consistent with education.   Normal comprehension.     Cranial Nerves   Cranial nerves II through XII intact.     CN II   Visual fields full to confrontation.   Visual acuity: normal  Right visual field deficit: none  Left visual field deficit: none     CN III, IV, VI   Pupils are equal, round, and reactive to light.  Extraocular motions are " normal.   Nystagmus: none   Diplopia: none  Ophthalmoparesis: none  Upgaze: normal  Downgaze: normal  Conjugate gaze: present    CN V   Facial sensation intact.   Right corneal reflex: normal  Left corneal reflex: normal    CN VII   Right facial weakness: none  Left facial weakness: none    CN VIII   Hearing: intact    CN IX, X   Palate: symmetric  Right gag reflex: normal  Left gag reflex: normal    CN XI   Right sternocleidomastoid strength: normal  Left sternocleidomastoid strength: normal    CN XII   Tongue: not atrophic  Fasciculations: absent  Tongue deviation: none    Motor Exam   Muscle bulk: normal  Overall muscle tone: normal    Strength   Strength 5/5 throughout.     Sensory Exam   Light touch normal.     Gait, Coordination, and Reflexes     Gait  Gait: normal    Tremor   Resting tremor: absent  Intention tremor: absent  Action tremor: absent    Reflexes   Reflexes 2+ except as noted.      Result Review :  The following data was reviewed by: Jesus Singh MD on 06/01/2023:  Common labs        8/12/2022    11:47   Common Labs   Glucose 77     BUN 7     Creatinine 0.59     Sodium 137     Potassium 4.6     Chloride 103     Calcium 9.4     Albumin 4.30     Total Bilirubin 0.3     Alkaline Phosphatase 57     AST (SGOT) 29     ALT (SGPT) 38     WBC 7.23     Hemoglobin 13.1     Hematocrit 40.0     Platelets 264     Total Cholesterol 255     Triglycerides 244     HDL Cholesterol 45     LDL Cholesterol  164                    Assessment and Plan   Diagnoses and all orders for this visit:    1. Migraine without aura and without status migrainosus, not intractable (Primary)  -     SUMAtriptan (IMITREX) 100 MG tablet; Take one tablet at onset of headache. May repeat dose one time in 2 hours if headache not relieved.  Dispense: 36 tablet; Refill: 3             Follow Up   No follow-ups on file.  Patient was given instructions and counseling regarding her condition or for health maintenance advice. Please see  specific information pulled into the AVS if appropriate.

## 2023-08-18 ENCOUNTER — OFFICE VISIT (OUTPATIENT)
Dept: INTERNAL MEDICINE | Facility: CLINIC | Age: 48
End: 2023-08-18
Payer: COMMERCIAL

## 2023-08-18 VITALS
TEMPERATURE: 97.8 F | HEART RATE: 88 BPM | HEIGHT: 68 IN | SYSTOLIC BLOOD PRESSURE: 118 MMHG | BODY MASS INDEX: 31.6 KG/M2 | DIASTOLIC BLOOD PRESSURE: 70 MMHG | RESPIRATION RATE: 20 BRPM | WEIGHT: 208.5 LBS

## 2023-08-18 DIAGNOSIS — Z00.00 ROUTINE HEALTH MAINTENANCE: Primary | ICD-10-CM

## 2023-08-18 DIAGNOSIS — R53.83 OTHER FATIGUE: ICD-10-CM

## 2023-08-18 DIAGNOSIS — F33.0 MILD EPISODE OF RECURRENT MAJOR DEPRESSIVE DISORDER: ICD-10-CM

## 2023-08-18 DIAGNOSIS — Z13.6 SCREENING FOR ISCHEMIC HEART DISEASE: ICD-10-CM

## 2023-08-18 DIAGNOSIS — G47.19 EXCESSIVE DAYTIME SLEEPINESS: ICD-10-CM

## 2023-08-18 DIAGNOSIS — Z12.31 BREAST CANCER SCREENING BY MAMMOGRAM: ICD-10-CM

## 2023-08-18 DIAGNOSIS — E55.9 VITAMIN D DEFICIENCY: ICD-10-CM

## 2023-08-18 LAB
BASOPHILS # BLD AUTO: 0.01 10*3/MM3 (ref 0–0.2)
BASOPHILS NFR BLD AUTO: 0.2 % (ref 0–1.5)
DEPRECATED RDW RBC AUTO: 39.4 FL (ref 37–54)
EOSINOPHIL # BLD AUTO: 0.25 10*3/MM3 (ref 0–0.4)
EOSINOPHIL NFR BLD AUTO: 4.7 % (ref 0.3–6.2)
ERYTHROCYTE [DISTWIDTH] IN BLOOD BY AUTOMATED COUNT: 12.3 % (ref 12.3–15.4)
HCT VFR BLD AUTO: 38.8 % (ref 34–46.6)
HGB BLD-MCNC: 13.2 G/DL (ref 12–15.9)
IMM GRANULOCYTES # BLD AUTO: 0.03 10*3/MM3 (ref 0–0.05)
IMM GRANULOCYTES NFR BLD AUTO: 0.6 % (ref 0–0.5)
LYMPHOCYTES # BLD AUTO: 1.7 10*3/MM3 (ref 0.7–3.1)
LYMPHOCYTES NFR BLD AUTO: 31.7 % (ref 19.6–45.3)
MCH RBC QN AUTO: 30 PG (ref 26.6–33)
MCHC RBC AUTO-ENTMCNC: 34 G/DL (ref 31.5–35.7)
MCV RBC AUTO: 88.2 FL (ref 79–97)
MONOCYTES # BLD AUTO: 0.45 10*3/MM3 (ref 0.1–0.9)
MONOCYTES NFR BLD AUTO: 8.4 % (ref 5–12)
NEUTROPHILS NFR BLD AUTO: 2.93 10*3/MM3 (ref 1.7–7)
NEUTROPHILS NFR BLD AUTO: 54.4 % (ref 42.7–76)
NRBC BLD AUTO-RTO: 0 /100 WBC (ref 0–0.2)
PLATELET # BLD AUTO: 246 10*3/MM3 (ref 140–450)
PMV BLD AUTO: 11.2 FL (ref 6–12)
RBC # BLD AUTO: 4.4 10*6/MM3 (ref 3.77–5.28)
WBC NRBC COR # BLD: 5.37 10*3/MM3 (ref 3.4–10.8)

## 2023-08-18 PROCEDURE — 99396 PREV VISIT EST AGE 40-64: CPT | Performed by: STUDENT IN AN ORGANIZED HEALTH CARE EDUCATION/TRAINING PROGRAM

## 2023-08-18 PROCEDURE — 80050 GENERAL HEALTH PANEL: CPT | Performed by: STUDENT IN AN ORGANIZED HEALTH CARE EDUCATION/TRAINING PROGRAM

## 2023-08-18 PROCEDURE — 82306 VITAMIN D 25 HYDROXY: CPT | Performed by: STUDENT IN AN ORGANIZED HEALTH CARE EDUCATION/TRAINING PROGRAM

## 2023-08-18 PROCEDURE — 82607 VITAMIN B-12: CPT | Performed by: STUDENT IN AN ORGANIZED HEALTH CARE EDUCATION/TRAINING PROGRAM

## 2023-08-18 PROCEDURE — 99214 OFFICE O/P EST MOD 30 MIN: CPT | Performed by: STUDENT IN AN ORGANIZED HEALTH CARE EDUCATION/TRAINING PROGRAM

## 2023-08-18 PROCEDURE — 80061 LIPID PANEL: CPT | Performed by: STUDENT IN AN ORGANIZED HEALTH CARE EDUCATION/TRAINING PROGRAM

## 2023-08-18 NOTE — PROGRESS NOTES
Female Physical Note      Date: 2023   Patient Name: Lucille Mejía  : 1975   MRN: [id number]     Chief Complaint:    Chief Complaint   Patient presents with    Annual Exam       History of Present Illness: Lucille Mejía is a 47 y.o. female who is here today for their annual health maintenance and physical.    HPI  Chiropractor: carmelo goodson    Vitamin C 500mg BID  Vit D 5000-90525 alternating daily    Lucille Mejía presents today for her annual physical.    The patient reports having fatigue and is sleeping a lot and does not feel refreshed when she wakes. She states she has not went to the sleep specialist due to having a full schedule. She notes having a sleep apnea test pre-covid and was diagnosed with mild apnea. She reports she started a new job as a , and is struggling with her energy levels. She states since starting the new job she has not walked as much as she use to which was 4 times a week. She notes several family members have narcolepsy. The patient reports taking melatonin 1.5 mg for sleep. She states her naturopath will be starting the patient on activated charcoal and saccharomyces boulardii for fatigue.    The patient reports she has depression that goes in tandem with her menstrual cycle, but the depression is better. She states she is still seeing Dr. Ledesma, and seen her 6 weeks ago. She notes she has discontinued taking the lithium, but is taking Vraylar 1.5 mg 4 times a week, Depakote 1000 mg daily, Lexapro 20 mg and naltrexone 0.25 mg daily. She reports she is doing talk therapy. She denies thoughts of self injury.      She continues to follow with a natropathic practitioner, Betsy Rodriguez for her mast cell activation syndrome. Previously followed with functional medicine at [facility]. States she is currently being tested for mold.    The patient reports she is a ramos and is seeing someone for voice therapy since losing  part of her voice. She states she sees Philip Hinton, chiropractor for back pain.    The patient reports she has headaches and sees Dr. Singh. She states she takes Imitrex as needed for migraines. She notes she is current up to date on eye and dental exams.    The patient reports being prescribed a Epi pen, but has never used it. 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 She notes she was treated with Benadryl and Pepcid.    The patient reports taking Vitamin C 500 mg twice a day and Vitamin D 5000-23309 alternating daily. She states she has more supplements she takes, but is uncertain what they are.    The patient reports she is hesitant to have a mammogram due to the radiation.    Subjective      Review of Systems:   Review of Systems    Past Medical History, Social History, Family History and Care Team were all reviewed with patient and updated as appropriate.     Medications:     Current Outpatient Medications:     ALPRAZolam (XANAX) 0.5 MG tablet, Take 1 tablet by mouth 3 (Three) Times a Day As Needed., Disp: , Rfl:     diphenhydrAMINE (BENADRYL) 25 MG tablet, Take 1 tablet by mouth Every 6 (Six) Hours As Needed for Itching., Disp: , Rfl:     divalproex (DEPAKOTE ER) 500 MG 24 hr tablet, Take 2 tablets by mouth Daily., Disp: , Rfl:     escitalopram (LEXAPRO) 20 MG tablet, Take  by mouth., Disp: , Rfl:     Ketotifen Fumarate powder, , Disp: , Rfl:     NALTREXONE HCL PO, Take 0.25 mg by mouth Daily., Disp: , Rfl:     SUMAtriptan (IMITREX) 100 MG tablet, Take one tablet at onset of headache. May repeat dose one time in 2 hours if headache not relieved., Disp: 36 tablet, Rfl: 3    Vraylar 1.5 MG capsule capsule, , Disp: , Rfl:     Allergies:   Allergies   Allergen Reactions    Amoxicillin-Pot Clavulanate GI Intolerance    Dexamethasone Mental Status Change     Manic episodes    Sulfa Antibiotics Hives       Immunizations:  Td/Tdap(Booster Q 10 yrs):   due  Flu (Yearly):  NA recommend this fall  Pneumonia: NA  Immunization History   Administered Date(s) Administered    COVID-19 (PFIZER) Purple Cap Monovalent 02/27/2021, 03/20/2021, 10/07/2021    DTaP 05/06/1977, 07/01/1977, 06/23/1978, 07/28/1992    Flu Vaccine Quad PF 6-35MO 11/05/2018    Fluzone Quad >6mos (Multi-dose) 11/05/2018    MMR 03/17/1978    OPV 07/01/1977, 06/23/1978, 05/06/2010    OPV UF 07/01/1977, 06/23/1978, 05/06/2010       Colorectal Screening:   UTD   Last Completed Colonoscopy            COLORECTAL CANCER SCREENING (COLOGUARD - Every 3 Years) Next due on 3/18/2026      03/18/2023  Cologuard component of Cologuard - Stool, Per Rectum                  Pap:  UTD, due 2024 , appointment 11/2023  Last Completed Pap Smear            PAP SMEAR (Every 3 Years) Next due on 6/17/2024 06/17/2021  Patient-Reported (Performed Externally) - [facility]                   Mammogram:  due  Last Completed Mammogram       This patient has no relevant Health Maintenance data.          Hep C (Age 18-79 once):  NR 1 year ago    A1c: due  Lipid panel:due  The 10-year ASCVD risk score (Chata MIRELES, et al., 2019) is: 1.5%    Values used to calculate the score:      Age: 47 years      Sex: Female      Is Non- : No      Diabetic: No      Tobacco smoker: No      Systolic Blood Pressure: 118 mmHg      Is BP treated: No      HDL Cholesterol: 45 mg/dL      Total Cholesterol: 255 mg/dL    Ophthalmologist: regularly  Dentist: regularly    Tobacco Use: Low Risk     Smoking Tobacco Use: Never    Smokeless Tobacco Use: Never    Passive Exposure: Not on file       Social History     Substance and Sexual Activity   Alcohol Use No        Social History     Substance and Sexual Activity   Drug Use Never        Diet/Physical activity:   Minimal. Not following a well balanced diet    PHQ-2 Depression Screening  Little interest or pleasure in doing things? 0-->not at all   Feeling down,  "depressed, or hopeless? 1-->several days   PHQ-2 Total Score 1     PHQ-9 Total Score: 1       Objective     Physical Exam:  Vital Signs:   Vitals:    08/18/23 1514   BP: 118/70   BP Location: Left arm   Patient Position: Sitting   Cuff Size: Adult   Pulse: 88   Resp: 20   Temp: 97.8 øF (36.6 øC)   TempSrc: Temporal   Weight: 94.6 kg (208 lb 8 oz)   Height: 172 cm (67.72\")   PainSc:   4   PainLoc: Back     Body mass index is 31.97 kg/mý.     Physical Exam  Vitals reviewed.   Constitutional:       General: She is not in acute distress.     Appearance: Normal appearance. She is obese. She is not ill-appearing or toxic-appearing.      Comments:      HENT:      Head: Normocephalic and atraumatic.      Right Ear: External ear normal.      Left Ear: External ear normal.      Nose:      Comments: Wearing mask  Eyes:      General: No scleral icterus.     Extraocular Movements: Extraocular movements intact.      Pupils: Pupils are equal, round, and reactive to light.   Cardiovascular:      Rate and Rhythm: Normal rate and regular rhythm.      Pulses: Normal pulses.      Heart sounds: No murmur heard.    No friction rub. No gallop.   Pulmonary:      Effort: Pulmonary effort is normal. No respiratory distress.      Breath sounds: Normal breath sounds. No stridor. No wheezing or rales.   Abdominal:      General: Abdomen is flat. Bowel sounds are normal. There is no distension.      Tenderness: There is no abdominal tenderness. There is no guarding.   Musculoskeletal:         General: No swelling or tenderness. Normal range of motion.      Cervical back: Normal range of motion. No rigidity.   Skin:     General: Skin is warm and dry.      Capillary Refill: Capillary refill takes less than 2 seconds.      Findings: No erythema or rash.   Neurological:      General: No focal deficit present.      Mental Status: She is alert and oriented to person, place, and time.      Cranial Nerves: No cranial nerve deficit.      Sensory: No " sensory deficit.      Motor: No weakness.      Gait: Gait normal.   Psychiatric:         Mood and Affect: Mood normal.         Behavior: Behavior normal.         Judgment: Judgment normal.       Procedures    Assessment / Plan      Assessment/Plan:   Problem List Items Addressed This Visit          Endocrine and Metabolic    Vitamin D deficiency    Relevant Orders    Vitamin D 25 hydroxy (Completed)       Mental Health    Mild episode of recurrent major depressive disorder    Current Assessment & Plan     Patient's depression is recurrent and is mild without psychosis. Their depression is currently in partial remission and the condition is worsening. This will be reassessed at the next regular appointment. F/U as described:patient will continue current medication therapy and continue follow up with psychiatry .            Sleep    Excessive daytime sleepiness    Relevant Orders    Ambulatory Referral to Sleep Medicine       Symptoms and Signs    Fatigue    Current Assessment & Plan     Chronic, worsening. I have concerns that many of her symptoms can be attributed to untreated JAVI. Will refer to sleep medicine and obtain labs as follows. Possibly chronic fatigue syndrome         Relevant Orders    TSH (Completed)    Vitamin B12 (Completed)     Other Visit Diagnoses       Routine health maintenance    -  Primary    Relevant Orders    CBC Auto Differential (Completed)    Comprehensive Metabolic Panel (Completed)    Screening for ischemic heart disease        Relevant Orders    Lipid Panel (Completed)    Breast cancer screening by mammogram        Relevant Orders    Mammo Screening Digital Tomosynthesis Bilateral With CAD            Healthcare Maintenance:  Counseling provided based on age appropriate USPSTF guidelines.       Lucille Mejía voices understanding and acceptance of this advice and will call back with any further questions or concerns. AVS with preventive healthcare tips printed for patient.          Follow Up:   Return in about 2 months (around 10/18/2023) for Recheck fatigue.      Transcribed from ambient dictation for Irineo Hernandez MD by Jailene Champagne.  08/18/23   17:11 EDT     Transcribed from ambient dictation for Irineo Hernandez MD by Jailene Champagne.  08/18/23   17:12 EDT    Patient or patient representative verbalized consent to the visit recording.  I have personally performed the services described in this document as transcribed by the above individual, and it is both accurate and complete.         MG ARIEL Fatima

## 2023-08-18 NOTE — PATIENT INSTRUCTIONS
Health Maintenance, Female  Adopting a healthy lifestyle and getting preventive care can go a long way to promote health and wellness. Talk with your health care provider about what schedule of regular examinations is right for you. This is a good chance for you to check in with your provider about disease prevention and staying healthy.  In between checkups, there are plenty of things you can do on your own. Experts have done a lot of research about which lifestyle changes and preventive measures are most likely to keep you healthy. Ask your health care provider for more information.  Weight and diet  Eat a healthy diet  Be sure to include plenty of vegetables, fruits, low-fat dairy products, and lean protein.  Do not eat a lot of foods high in solid fats, added sugars, or salt.  Get regular exercise. This is one of the most important things you can do for your health.  Most adults should exercise for at least 150 minutes each week. The exercise should increase your heart rate and make you sweat (moderate-intensity exercise).  Most adults should also do strengthening exercises at least twice a week. This is in addition to the moderate-intensity exercise.     Maintain a healthy weight  Body mass index (BMI) is a measurement that can be used to identify possible weight problems. It estimates body fat based on height and weight. Your health care provider can help determine your BMI and help you achieve or maintain a healthy weight.  For females 20 years of age and older:  A BMI below 18.5 is considered underweight.  A BMI of 18.5 to 24.9 is normal.  A BMI of 25 to 29.9 is considered overweight.  A BMI of 30 and above is considered obese.     Watch levels of cholesterol and blood lipids  You should start having your blood tested for lipids and cholesterol at 20 years of age, then have this test every 5 years.  You may need to have your cholesterol levels checked more often if:  Your lipid or cholesterol levels are  high.  You are older than 50 years of age.  You are at high risk for heart disease.     Cancer screening  Lung Cancer  Lung cancer screening is recommended for adults 55-80 years old who are at high risk for lung cancer because of a history of smoking.  A yearly low-dose CT scan of the lungs is recommended for people who:  Currently smoke.  Have quit within the past 15 years.  Have at least a 30-pack-year history of smoking. A pack year is smoking an average of one pack of cigarettes a day for 1 year.  Yearly screening should continue until it has been 15 years since you quit.  Yearly screening should stop if you develop a health problem that would prevent you from having lung cancer treatment.     Breast Cancer  Practice breast self-awareness. This means understanding how your breasts normally appear and feel.  It also means doing regular breast self-exams. Let your health care provider know about any changes, no matter how small.  If you are in your 20s or 30s, you should have a clinical breast exam (CBE) by a health care provider every 1-3 years as part of a regular health exam.  If you are 40 or older, have a CBE every year. Also consider having a breast X-ray (mammogram) every year.  If you have a family history of breast cancer, talk to your health care provider about genetic screening.  If you are at high risk for breast cancer, talk to your health care provider about having an MRI and a mammogram every year.  Breast cancer gene (BRCA) assessment is recommended for women who have family members with BRCA-related cancers. BRCA-related cancers include:  Breast.  Ovarian.  Tubal.  Peritoneal cancers.  Results of the assessment will determine the need for genetic counseling and BRCA1 and BRCA2 testing.     Cervical Cancer  Your health care provider may recommend that you be screened regularly for cancer of the pelvic organs (ovaries, uterus, and vagina). This screening involves a pelvic examination, including  checking for microscopic changes to the surface of your cervix (Pap test). You may be encouraged to have this screening done every 3 years, beginning at age 21.  For women ages 30-65, health care providers may recommend pelvic exams and Pap testing every 3 years, or they may recommend the Pap and pelvic exam, combined with testing for human papilloma virus (HPV), every 5 years. Some types of HPV increase your risk of cervical cancer. Testing for HPV may also be done on women of any age with unclear Pap test results.  Other health care providers may not recommend any screening for nonpregnant women who are considered low risk for pelvic cancer and who do not have symptoms. Ask your health care provider if a screening pelvic exam is right for you.  If you have had past treatment for cervical cancer or a condition that could lead to cancer, you need Pap tests and screening for cancer for at least 20 years after your treatment. If Pap tests have been discontinued, your risk factors (such as having a new sexual partner) need to be reassessed to determine if screening should resume. Some women have medical problems that increase the chance of getting cervical cancer. In these cases, your health care provider may recommend more frequent screening and Pap tests.     Colorectal Cancer  This type of cancer can be detected and often prevented.  Routine colorectal cancer screening usually begins at 50 years of age and continues through 75 years of age.  Your health care provider may recommend screening at an earlier age if you have risk factors for colon cancer.  Your health care provider may also recommend using home test kits to check for hidden blood in the stool.  A small camera at the end of a tube can be used to examine your colon directly (sigmoidoscopy or colonoscopy). This is done to check for the earliest forms of colorectal cancer.  Routine screening usually begins at age 50.  Direct examination of the colon should  be repeated every 5-10 years through 75 years of age. However, you may need to be screened more often if early forms of precancerous polyps or small growths are found.     Skin Cancer  Check your skin from head to toe regularly.  Tell your health care provider about any new moles or changes in moles, especially if there is a change in a mole's shape or color.  Also tell your health care provider if you have a mole that is larger than the size of a pencil eraser.  Always use sunscreen. Apply sunscreen liberally and repeatedly throughout the day.  Protect yourself by wearing long sleeves, pants, a wide-brimmed hat, and sunglasses whenever you are outside.     Heart disease, diabetes, and high blood pressure  High blood pressure causes heart disease and increases the risk of stroke. High blood pressure is more likely to develop in:  People who have blood pressure in the high end of the normal range (130-139/85-89 mm Hg).  People who are overweight or obese.  People who are .  If you are 18-39 years of age, have your blood pressure checked every 3-5 years. If you are 40 years of age or older, have your blood pressure checked every year. You should have your blood pressure measured twice--once when you are at a hospital or clinic, and once when you are not at a hospital or clinic. Record the average of the two measurements. To check your blood pressure when you are not at a hospital or clinic, you can use:  An automated blood pressure machine at a pharmacy.  A home blood pressure monitor.  If you are between 55 years and 79 years old, ask your health care provider if you should take aspirin to prevent strokes.  Have regular diabetes screenings. This involves taking a blood sample to check your fasting blood sugar level.  If you are at a normal weight and have a low risk for diabetes, have this test once every three years after 45 years of age.  If you are overweight and have a high risk for diabetes,  consider being tested at a younger age or more often.  Preventing infection  Hepatitis B  If you have a higher risk for hepatitis B, you should be screened for this virus. You are considered at high risk for hepatitis B if:  You were born in a country where hepatitis B is common. Ask your health care provider which countries are considered high risk.  Your parents were born in a high-risk country, and you have not been immunized against hepatitis B (hepatitis B vaccine).  You have HIV or AIDS.  You use needles to inject street drugs.  You live with someone who has hepatitis B.  You have had sex with someone who has hepatitis B.  You get hemodialysis treatment.  You take certain medicines for conditions, including cancer, organ transplantation, and autoimmune conditions.     Hepatitis C  Blood testing is recommended for:  Everyone born from 1945 through 1965.  Anyone with known risk factors for hepatitis C.     Sexually transmitted infections (STIs)  You should be screened for sexually transmitted infections (STIs) including gonorrhea and chlamydia if:  You are sexually active and are younger than 24 years of age.  You are older than 24 years of age and your health care provider tells you that you are at risk for this type of infection.  Your sexual activity has changed since you were last screened and you are at an increased risk for chlamydia or gonorrhea. Ask your health care provider if you are at risk.  If you do not have HIV, but are at risk, it may be recommended that you take a prescription medicine daily to prevent HIV infection. This is called pre-exposure prophylaxis (PrEP). You are considered at risk if:  You are sexually active and do not regularly use condoms or know the HIV status of your partner(s).  You take drugs by injection.  You are sexually active with a partner who has HIV.     Talk with your health care provider about whether you are at high risk of being infected with HIV. If you choose to  begin PrEP, you should first be tested for HIV. You should then be tested every 3 months for as long as you are taking PrEP.  Pregnancy  If you are premenopausal and you may become pregnant, ask your health care provider about preconception counseling.  If you may become pregnant, take 400 to 800 micrograms (mcg) of folic acid every day.  If you want to prevent pregnancy, talk to your health care provider about birth control (contraception).  Osteoporosis and menopause  Osteoporosis is a disease in which the bones lose minerals and strength with aging. This can result in serious bone fractures. Your risk for osteoporosis can be identified using a bone density scan.  If you are 65 years of age or older, or if you are at risk for osteoporosis and fractures, ask your health care provider if you should be screened.  Ask your health care provider whether you should take a calcium or vitamin D supplement to lower your risk for osteoporosis.  Menopause may have certain physical symptoms and risks.  Hormone replacement therapy may reduce some of these symptoms and risks.  Talk to your health care provider about whether hormone replacement therapy is right for you.  Follow these instructions at home:  Schedule regular health, dental, and eye exams.  Stay current with your immunizations.  Do not use any tobacco products including cigarettes, chewing tobacco, or electronic cigarettes.  If you are pregnant, do not drink alcohol.  If you are breastfeeding, limit how much and how often you drink alcohol.  Limit alcohol intake to no more than 1 drink per day for nonpregnant women. One drink equals 12 ounces of beer, 5 ounces of wine, or 1« ounces of hard liquor.  Do not use street drugs.  Do not share needles.  Ask your health care provider for help if you need support or information about quitting drugs.  Tell your health care provider if you often feel depressed.  Tell your health care provider if you have ever been abused or do  not feel safe at home.  This information is not intended to replace advice given to you by your health care provider. Make sure you discuss any questions you have with your health care provider.  Document Released: 07/02/2012 Document Revised: 05/25/2017 Document Reviewed: 09/20/2016  [employer] Patient Education c 2018 [employer] Healthy Eating  Following a healthy eating pattern may help you to achieve and maintain a healthy body weight, reduce the risk of chronic disease, and live a long and productive life. It is important to follow a healthy eating pattern at an appropriate calorie level for your body. Your nutritional needs should be met primarily through food by choosing a variety of nutrient-rich foods.  What are tips for following this plan?  Reading food labels  Read labels and choose the following:  Reduced or low sodium.  Juices with 100% fruit juice.  Foods with low saturated fats and high polyunsaturated and monounsaturated fats.  Foods with whole grains, such as whole wheat, cracked wheat, brown rice, and wild rice.  Whole grains that are fortified with folic acid. This is recommended for women who are pregnant or who want to become pregnant.  Read labels and avoid the following:  Foods with a lot of added sugars. These include foods that contain brown sugar, corn sweetener, corn syrup, dextrose, fructose, glucose, high-fructose corn syrup, honey, invert sugar, lactose, malt syrup, maltose, molasses, raw sugar, sucrose, trehalose, or turbinado sugar.  Do not eat more than the following amounts of added sugar per day:  6 teaspoons (25 g) for women.  9 teaspoons (38 g) for men.  Foods that contain processed or refined starches and grains.  Refined grain products, such as white flour, degermed cornmeal, white bread, and white rice.  Shopping  Choose nutrient-rich snacks, such as vegetables, whole fruits, and nuts. Avoid high-calorie and high-sugar snacks, such as potato chips, fruit snacks,  "and candy.  Use oil-based dressings and spreads on foods instead of solid fats such as butter, stick margarine, or cream cheese.  Limit pre-made sauces, mixes, and \"instant\" products such as flavored rice, instant noodles, and ready-made pasta.  Try more plant-protein sources, such as tofu, tempeh, black beans, edamame, lentils, nuts, and seeds.  Explore eating plans such as the Mediterranean diet or vegetarian diet.  Cooking  Use oil to saut‚ or stir-patel foods instead of solid fats such as butter, stick margarine, or lard.  Try baking, boiling, grilling, or broiling instead of frying.  Remove the fatty part of meats before cooking.  Steam vegetables in water or broth.  Meal planning    At meals, imagine dividing your plate into fourths:  One-half of your plate is fruits and vegetables.  One-fourth of your plate is whole grains.  One-fourth of your plate is protein, especially lean meats, poultry, eggs, tofu, beans, or nuts.  Include low-fat dairy as part of your daily diet.     Lifestyle  Choose healthy options in all settings, including home, work, school, restaurants, or stores.  Prepare your food safely:  Wash your hands after handling raw meats.  Keep food preparation surfaces clean by regularly washing with hot, soapy water.  Keep raw meats separate from ready-to-eat foods, such as fruits and vegetables.  , meat, poultry, and eggs to the recommended internal temperature.  Store foods at safe temperatures. In general:  Keep cold foods at 40øF (4.4øC) or below.  Keep hot foods at 140øF (60øC) or above.  Keep your freezer at 0øF (-17.8øC) or below.  Foods are no longer safe to eat when they have been between the temperatures of 40ø-140øF (4.4-60øC) for more than 2 hours.  What foods should I eat?  Fruits  Aim to eat 2 cup-equivalents of fresh, canned (in natural juice), or frozen fruits each day. Examples of 1 cup-equivalent of fruit include 1 small apple, 8 large strawberries, 1 cup canned fruit, « " cup dried fruit, or 1 cup 100% juice.  Vegetables  Aim to eat 2«-3 cup-equivalents of fresh and frozen vegetables each day, including different varieties and colors. Examples of 1 cup-equivalent of vegetables include 2 medium carrots, 2 cups raw, leafy greens, 1 cup chopped vegetable (raw or cooked), or 1 medium baked potato.  Grains  Aim to eat 6 ounce-equivalents of whole grains each day. Examples of 1 ounce-equivalent of grains include 1 slice of bread, 1 cup ready-to-eat cereal, 3 cups popcorn, or « cup cooked rice, pasta, or cereal.  Meats and other proteins  Aim to eat 5-6 ounce-equivalents of protein each day. Examples of 1 ounce-equivalent of protein include 1 egg, 1/2 cup nuts or seeds, or 1 tablespoon (16 g) peanut butter. A cut of meat or fish that is the size of a deck of cards is about 3-4 ounce-equivalents.  Of the protein you eat each week, try to have at least 8 ounces come from seafood. This includes salmon, trout, herring, and anchovies.  Dairy  Aim to eat 3 cup-equivalents of fat-free or low-fat dairy each day. Examples of 1 cup-equivalent of dairy include 1 cup (240 mL) milk, 8 ounces (250 g) yogurt, 1« ounces (44 g) natural cheese, or 1 cup (240 mL) fortified soy milk.  Fats and oils  Aim for about 5 teaspoons (21 g) per day. Choose monounsaturated fats, such as canola and olive oils, avocados, peanut butter, and most nuts, or polyunsaturated fats, such as sunflower, corn, and soybean oils, walnuts, pine nuts, sesame seeds, sunflower seeds, and flaxseed.  Beverages  Aim for six 8-oz glasses of water per day. Limit coffee to three to five 8-oz cups per day.  Limit caffeinated beverages that have added calories, such as soda and energy drinks.  Limit alcohol intake to no more than 1 drink a day for nonpregnant women and 2 drinks a day for men. One drink equals 12 oz of beer (355 mL), 5 oz of wine (148 mL), or 1« oz of hard liquor (44 mL).  Seasoning and other foods  Avoid adding excess amounts of  salt to your foods. Try flavoring foods with herbs and spices instead of salt.  Avoid adding sugar to foods.  Try using oil-based dressings, sauces, and spreads instead of solid fats.  This information is based on general [place] nutrition guidelines. For more information, visit choosemyplate.gov. Exact amounts may vary based on your nutrition needs.  Summary  A healthy eating plan may help you to maintain a healthy weight, reduce the risk of chronic diseases, and stay active throughout your life.  Plan your meals. Make sure you eat the right portions of a variety of nutrient-rich foods.  Try baking, boiling, grilling, or broiling instead of frying.  Choose healthy options in all settings, including home, work, school, restaurants, or stores.  This information is not intended to replace advice given to you by your health care provider. Make sure you discuss any questions you have with your health care provider.  Document Revised: 04/01/2019 Document Reviewed: 04/01/2019  [employer] Patient Education c 2021 [employer]    Exercising to Stay Healthy  To become healthy and stay healthy, it is recommended that you do moderate-intensity and vigorous-intensity exercise. You can tell that you are exercising at a moderate intensity if your heart starts beating faster and you start breathing faster but can still hold a conversation. You can tell that you are exercising at a vigorous intensity if you are breathing much harder and faster and cannot hold a conversation while exercising.  Exercising regularly is important. It has many health benefits, such as:  Improving overall fitness, flexibility, and endurance.  Increasing bone density.  Helping with weight control.  Decreasing body fat.  Increasing muscle strength.  Reducing stress and tension.  Improving overall health.  How often should I exercise?  Choose an activity that you enjoy, and set realistic goals. Your health care provider can help you make an activity plan that  works for you.  Exercise regularly as told by your health care provider. This may include:  Doing strength training two times a week, such as:  Lifting weights.  Using resistance bands.  Push-ups.  Sit-ups.  Yoga.  Doing a certain intensity of exercise for a given amount of time. Choose from these options:  A total of 150 minutes of moderate-intensity exercise every week.  A total of 75 minutes of vigorous-intensity exercise every week.  A mix of moderate-intensity and vigorous-intensity exercise every week.  Children, pregnant women, people who have not exercised regularly, people who are overweight, and older adults may need to talk with a health care provider about what activities are safe to do. If you have a medical condition, be sure to talk with your health care provider before you start a new exercise program.  What are some exercise ideas?    Moderate-intensity exercise ideas include:  Walking 1 mile (1.6 km) in about 15 minutes.  Biking.  Hiking.  Golfing.  Dancing.  Water aerobics.  Vigorous-intensity exercise ideas include:  Walking 4.5 miles (7.2 km) or more in about 1 hour.  Jogging or running 5 miles (8 km) in about 1 hour.  Biking 10 miles (16.1 km) or more in about 1 hour.  Lap swimming.  Roller-skating or in-line skating.  Cross-country skiing.  Vigorous competitive sports, such as football, basketball, and soccer.  Jumping rope.  Aerobic dancing.  What are some everyday activities that can help me to get exercise?  Yard work, such as:  Pushing a .  Raking and bagging leaves.  Washing your car.  Pushing a stroller.  Shoveling snow.  Gardening.  Washing windows or floors.  How can I be more active in my day-to-day activities?  Use stairs instead of an elevator.  Take a walk during your lunch break.  If you drive, park your car farther away from your work or school.  If you take public transportation, get off one stop early and walk the rest of the way.  Stand up or walk around during all  of your indoor phone calls.  Get up, stretch, and walk around every 30 minutes throughout the day.  Enjoy exercise with a friend. Support to continue exercising will help you keep a regular routine of activity.  What guidelines can I follow while exercising?  Before you start a new exercise program, talk with your health care provider.  Do not exercise so much that you hurt yourself, feel dizzy, or get very short of breath.  Wear comfortable clothes and wear shoes with good support.  Drink plenty of water while you exercise to prevent dehydration or heat stroke.  Work out until your breathing and your heartbeat get faster.  Where to find more information  [place] Department of Health and Human Services: www.hhs.gov  Centers for Disease Control and Prevention (CDC): www.cdc.gov  Summary  Exercising regularly is important. It will improve your overall fitness, flexibility, and endurance.  Regular exercise also will improve your overall health. It can help you control your weight, reduce stress, and improve your bone density.  Do not exercise so much that you hurt yourself, feel dizzy, or get very short of breath.  Before you start a new exercise program, talk with your health care provider.  This information is not intended to replace advice given to you by your health care provider. Make sure you discuss any questions you have with your health care provider.  Document Revised: 11/30/2018 Document Reviewed: 11/08/2018  [employer] Patient Education c 2021 [employer]

## 2023-08-19 LAB
25(OH)D3 SERPL-MCNC: 45.3 NG/ML (ref 30–100)
ALBUMIN SERPL-MCNC: 4 G/DL (ref 3.5–5.2)
ALBUMIN/GLOB SERPL: 1.6 G/DL
ALP SERPL-CCNC: 61 U/L (ref 39–117)
ALT SERPL W P-5'-P-CCNC: 11 U/L (ref 1–33)
ANION GAP SERPL CALCULATED.3IONS-SCNC: 9.5 MMOL/L (ref 5–15)
AST SERPL-CCNC: 13 U/L (ref 1–32)
BILIRUB SERPL-MCNC: 0.3 MG/DL (ref 0–1.2)
BUN SERPL-MCNC: 12 MG/DL (ref 6–20)
BUN/CREAT SERPL: 20.7 (ref 7–25)
CALCIUM SPEC-SCNC: 9.5 MG/DL (ref 8.6–10.5)
CHLORIDE SERPL-SCNC: 106 MMOL/L (ref 98–107)
CHOLEST SERPL-MCNC: 205 MG/DL (ref 0–200)
CO2 SERPL-SCNC: 23.5 MMOL/L (ref 22–29)
CREAT SERPL-MCNC: 0.58 MG/DL (ref 0.57–1)
EGFRCR SERPLBLD CKD-EPI 2021: 113.2 ML/MIN/1.73
GLOBULIN UR ELPH-MCNC: 2.5 GM/DL
GLUCOSE SERPL-MCNC: 119 MG/DL (ref 65–99)
HDLC SERPL-MCNC: 33 MG/DL (ref 40–60)
LDLC SERPL CALC-MCNC: 134 MG/DL (ref 0–100)
LDLC/HDLC SERPL: 3.93 {RATIO}
POTASSIUM SERPL-SCNC: 4.3 MMOL/L (ref 3.5–5.2)
PROT SERPL-MCNC: 6.5 G/DL (ref 6–8.5)
SODIUM SERPL-SCNC: 139 MMOL/L (ref 136–145)
TRIGL SERPL-MCNC: 212 MG/DL (ref 0–150)
TSH SERPL DL<=0.05 MIU/L-ACNC: 1.57 UIU/ML (ref 0.27–4.2)
VIT B12 BLD-MCNC: 719 PG/ML (ref 211–946)
VLDLC SERPL-MCNC: 38 MG/DL (ref 5–40)

## 2023-08-21 NOTE — ASSESSMENT & PLAN NOTE
Patient's depression is recurrent and is mild without psychosis. Their depression is currently in partial remission and the condition is worsening. This will be reassessed at the next regular appointment. F/U as described:patient will continue current medication therapy and continue follow up with psychiatry .

## 2023-08-21 NOTE — ASSESSMENT & PLAN NOTE
Chronic, worsening. I have concerns that many of her symptoms can be attributed to untreated JAVI. Will refer to sleep medicine and obtain labs as follows. Possibly chronic fatigue syndrome

## 2023-10-23 ENCOUNTER — OFFICE VISIT (OUTPATIENT)
Dept: INTERNAL MEDICINE | Facility: CLINIC | Age: 48
End: 2023-10-23
Payer: COMMERCIAL

## 2023-10-23 VITALS
DIASTOLIC BLOOD PRESSURE: 78 MMHG | BODY MASS INDEX: 31.76 KG/M2 | HEART RATE: 72 BPM | WEIGHT: 207.13 LBS | TEMPERATURE: 97.7 F | RESPIRATION RATE: 20 BRPM | SYSTOLIC BLOOD PRESSURE: 124 MMHG

## 2023-10-23 DIAGNOSIS — R53.82 CHRONIC FATIGUE: Primary | ICD-10-CM

## 2023-10-23 PROCEDURE — 99214 OFFICE O/P EST MOD 30 MIN: CPT | Performed by: STUDENT IN AN ORGANIZED HEALTH CARE EDUCATION/TRAINING PROGRAM

## 2023-10-23 NOTE — PROGRESS NOTES
"    Follow Up Office Visit      Date: 10/23/2023   Patient Name: Lucille Mejía  : 1975   MRN: [id number]     Chief Complaint:    Chief Complaint   Patient presents with    Follow-up     2 month fatigue        History of Present Illness: Lucille Mejía is a 47 y.o. female who is here today to follow up with chronic fatigue.    The patient is here today to follow up with chronic fatigue. She has consented to the use of RAFAEL.     Chronic fatigue.   The patient states her fatigue is about the same. She states days she works, she pushes through. She states on her off days she is \"really tired\". She states she is more tired on days she does not work, and days she does work, she \"comes home and crashes\". The patient states if she is able to nap during the day, she will have \"a few good hours toward the end of the day. She states on , she has to awake early for work. She notes on Saturday, she becomes anxious and wakes every 20 minutes. The patient states she feels as if she has narcolepsy. She states this is \"tiredness\". She notes a past medication of fibromyalgia. She denies falling asleep unintentionally while driving.     Depression.   The patient states she is \"pretty depressed right now\". She adds her \"mood crashes\" each month, and she correlates this to her menstrual cycle.  She states it has been \"bad every month\" in . She reports postmenstrual dysphoria. The patient is currently following with Dr. Ledesma, and notes an upcoming appointment. She notes she did at home hormone testing.     Back pain.   The patient reports a past history of back pain, and states she has attended physical therapy. She states this was helpful.     Health maintenance.  The patient refuses an influenzas immunization at this time.     Nervous system regulation.   The patient states she is doing a program called [employer]. She states this is nervous system regulation. She takes she is taking " supplements, but this is not involved with the [facility] program.       Fatigue  Referred to sleep medicine, has appointment 11/10/23      Subjective      Review of Systems:   Review of Systems   Constitutional:  Positive for fatigue.   Musculoskeletal:  Positive for back pain.   Psychiatric/Behavioral:  Positive for sleep disturbance and depressed mood.        I have reviewed the patients family history, social history, past medical history, past surgical history and have updated it as appropriate.     Medications:     Current Outpatient Medications:     ALPRAZolam (XANAX) 0.5 MG tablet, Take 1 tablet by mouth 3 (Three) Times a Day As Needed., Disp: , Rfl:     diphenhydrAMINE (BENADRYL) 25 MG tablet, Take 1 tablet by mouth Every 6 (Six) Hours As Needed for Itching., Disp: , Rfl:     divalproex (DEPAKOTE ER) 500 MG 24 hr tablet, Take 2 tablets by mouth Daily., Disp: , Rfl:     escitalopram (LEXAPRO) 20 MG tablet, Take  by mouth., Disp: , Rfl:     Ketotifen Fumarate powder, , Disp: , Rfl:     NALTREXONE HCL PO, Take 0.25 mg by mouth Daily., Disp: , Rfl:     SUMAtriptan (IMITREX) 100 MG tablet, Take one tablet at onset of headache. May repeat dose one time in 2 hours if headache not relieved., Disp: 36 tablet, Rfl: 3    Vraylar 1.5 MG capsule capsule, , Disp: , Rfl:     Allergies:   Allergies   Allergen Reactions    Amoxicillin-Pot Clavulanate GI Intolerance    Dexamethasone Mental Status Change     Manic episodes    Sulfa Antibiotics Hives       Objective     Physical Exam: Please see above  Vital Signs:   Vitals:    10/23/23 1533   BP: 124/78   BP Location: Left arm   Patient Position: Sitting   Cuff Size: Adult   Pulse: 72   Resp: 20   Temp: 97.7 °F (36.5 °C)   TempSrc: Temporal   Weight: 94 kg (207 lb 2 oz)   PainSc: 0-No pain     Body mass index is 31.76 kg/m².          Physical Exam  Vitals reviewed.   Constitutional:       General: She is not in acute distress.     Appearance: Normal appearance. She is  obese. She is not ill-appearing or toxic-appearing.      Comments:      HENT:      Head: Normocephalic and atraumatic.      Right Ear: External ear normal.      Left Ear: External ear normal.      Nose: Nose normal.      Mouth/Throat:      Mouth: Mucous membranes are moist.   Eyes:      General: No scleral icterus.     Extraocular Movements: Extraocular movements intact.      Pupils: Pupils are equal, round, and reactive to light.   Cardiovascular:      Rate and Rhythm: Normal rate and regular rhythm.      Pulses: Normal pulses.      Heart sounds: No murmur heard.     No friction rub. No gallop.   Pulmonary:      Effort: Pulmonary effort is normal. No respiratory distress.      Breath sounds: Normal breath sounds. No stridor. No wheezing or rales.   Abdominal:      General: Abdomen is flat. There is no distension.   Musculoskeletal:         General: No swelling or tenderness. Normal range of motion.      Cervical back: Normal range of motion. No rigidity.   Skin:     General: Skin is warm and dry.      Capillary Refill: Capillary refill takes less than 2 seconds.      Findings: No erythema or rash.   Neurological:      General: No focal deficit present.      Mental Status: She is alert and oriented to person, place, and time.      Cranial Nerves: No cranial nerve deficit.      Sensory: No sensory deficit.      Motor: No weakness.      Gait: Gait normal.   Psychiatric:         Mood and Affect: Mood normal.         Behavior: Behavior normal.         Judgment: Judgment normal.         Procedures    Results:   Labs:   TSH   Date Value Ref Range Status   08/18/2023 1.570 0.270 - 4.200 uIU/mL Final        Imaging:   No valid procedures specified.     Assessment / Plan      Assessment/Plan:   Problem List Items Addressed This Visit       Fatigue - Primary    Relevant Orders    Comprehensive metabolic panel    CBC w AUTO Differential    TSH    T4, free    Sedimentation Rate    CK    ACTH    Cortisol - AM       1. Chronic  fatigue.   - Patient's chronic fatigue is likely multifactorial from patient's poor sleep habits as well as her fibromyalgia and chronic mood disorders. Recommend close follow-up with Dr. Ledesma for psychiatry for continued medication management and alteration of her medications. Patient has upcoming evaluation with sleep medicine in November, I feel that this will be very important in determining possible etiologies of her fatigue.    - We will obtain repeat labs including CBC, CMP, TSH, free T4, ESR, 8 AM cortisol and ACTH and creatine kinase to evaluate for other possible etiologies of her chronic fatigue. If these tests are all negative, then patient likely has a diagnosis of chronic fatigue syndrome.    Follow Up:   Return in about 3 months (around 1/23/2024) for Recheck chronic, 8am labs in 2 months..        Irineo Hernandez MD   [facility] Jaylan Fatima    Transcribed from ambient dictation for Irineo Hernandez MD by Shakira Quiroz.  10/23/23   16:25 EDT    Patient or patient representative verbalized consent to the visit recording.  I have personally performed the services described in this document as transcribed by the above individual, and it is both accurate and complete.

## 2023-11-06 NOTE — PROGRESS NOTES
"Sleep Clinic Follow Up Note    Chief Complaint  Follow-up of sleep problem    Subjective     History of Present Illness (from previous encounter on 12/13/2022 with Dr. Callahan):  She is here primarily due to excessive daytime somnolence.     She has a past medical history significant for allergic rhinitis, dyslipidemia, vitamin D deficiency, bipolar 1 on Vraylar and lithium, mast cell activation syndrome, and fibromyalgia.     She sleeps an average of 10 hours per night.  She typically is in bed from 1 AM until 11:30 AM.  She will then often take a nap during the afternoon.  Her  notes that she is excessively somnolent.     He has noted snoring but has not noted specific apneas.  He does not note consistent snoring and only intermittent.     She does admit to morning headaches but attributes this to bruxism.  She does awaken with a dry mouth in the morning.     She denies any RLS type symptoms.  She has no parasomnias other than the bruxism.  She notes no nocturnal hallucinations or sleep paralysis.     She has a history of a home sleep apnea test at  in 2019.  Her home study was described to her as \"inconclusive\" and suggestive of mild JAVI.  A polysomnogram was recommended but never accomplished due to the pandemic.     [place] Scale is: 9/24  (End copied text).    -A home sleep test was ordered at her evaluation 12/13/2022 but has not yet been obtained.    Interval History:  Lucille Mejía is a 47 y.o. female returns for follow up. The patient was last seen on 12/13/2022 with Dr. Callahan.  Review of patient's self-reported questionnaire notes symptoms including snoring, daytime sleepiness and fatigue, nonrestorative sleep, grinding teeth, and decreased libido.  Patient reports having a sleep study back in 2019.  Patient typically goes to bed at 1 AM waking at noon on weekdays, and midnight waking at 8 AM on Sundays.  She estimates an average of 10 hours of sleep per night and it takes " "approximately 15 minutes for her to go to sleep.  She takes 1 to 3-hour naps.  She denies use of tobacco, alcohol, or illicit/recreational drugs.  Patient drinks 1 cup decaf tea daily. She feels a little better since she was last seen. Her naps are not quite as long now.; mostly 1 hour instead of 2-3.     The patient reports the following changes to their medical and medication history since they were last seen:  None      Further details are as follows:      [place] Scale is (out of 24): Total score: 9     Weight:  Current Weight: 205 lb    Weight change in the last year:  loss: 5 lbs    The patient's relevant past medical, surgical, family, and social history reviewed and updated in Epic as appropriate.    PMH:    Past Medical History:   Diagnosis Date    Ankle sprain 1997    Left ankle.  Fell down a flight of stairs, bad sprain.    Asthma 2015    Bipolar 1 disorder     Cervical disc disorder     Depression     Fracture of ankle 2020    Left fibula    Fracture, fibula Nov 2020    Headache 2019    Hyperlipidemia 2022    Knee sprain 1998    Left knee    Low back strain 2020    Pain in sciatic nerve    Neuroma of foot 1996    Right foot, four cryosurgeries.    Personal history of other medical treatment 12/2020    pt states she has \"histamine intolerance\"    Scoliosis 2004    Possible    Stress fracture 2020    Of left fibula    Tennis elbow 2004    From playing piano, severe injury     Past Surgical History:   Procedure Laterality Date    FOOT SURGERY Right     Love's neuroma    LIPOMA EXCISION       OB History    No obstetric history on file.       Allergies   Allergen Reactions    Amoxicillin-Pot Clavulanate GI Intolerance    Dexamethasone Mental Status Change     Manic episodes    Sulfa Antibiotics Hives       MEDS:  Prior to Admission medications    Medication Sig Start Date End Date Taking? Authorizing Provider   ALPRAZolam (XANAX) 0.5 MG tablet Take 1 tablet by mouth 3 (Three) Times a Day As Needed. 3/1/23   " "Simeon Kumar MD   diphenhydrAMINE (BENADRYL) 25 MG tablet Take 1 tablet by mouth Every 6 (Six) Hours As Needed for Itching.    Simeon Kumar MD   divalproex (DEPAKOTE ER) 500 MG 24 hr tablet Take 2 tablets by mouth Daily. 12/15/22   Simeon Kumar MD   escitalopram (LEXAPRO) 20 MG tablet Take  by mouth. 12/4/15   Simeon Kumar MD   Ketotifen Fumarate powder     Emergency, Nurse Talon, RN   NALTREXONE HCL PO Take 0.25 mg by mouth Daily.    Simeon Kumar MD   SUMAtriptan (IMITREX) 100 MG tablet Take one tablet at onset of headache. May repeat dose one time in 2 hours if headache not relieved. 6/1/23   Jesus Singh MD   Vraylar 1.5 MG capsule capsule  7/8/22   Simeon Kumar MD         FH:  Family History   Problem Relation Age of Onset    Depression Mother     Vision loss Mother     Anxiety disorder Father     Depression Father     Hearing loss Father     Hyperlipidemia Father     Stroke Father     Cancer Maternal Aunt         post-menopausal    Cancer Paternal Aunt         post-menopausal    Mental illness Paternal Aunt         Bipolar    Mental illness Paternal Aunt         Cyclothemia    Developmental Disability Paternal Uncle     Cancer Paternal Grandmother         post-menopausal    Narcolepsy Paternal Grandmother     Narcolepsy Paternal Cousin        Objective   Vital Signs:  Ht 171.5 cm (67.52\")   Wt 93 kg (205 lb)   BMI 31.61 kg/m²     Patient's (Body mass index is 31.61 kg/m².) indicates that they are obese (BMI >30) with health related conditions that include obstructive sleep apnea, hypertension, coronary heart disease, diabetes mellitus, and dyslipidemias . Weight is improving with lifestyle modifications. BMI is is above average; BMI management plan is completed.          Physical Exam  Constitutional:       General: She is not in acute distress.     Appearance: Normal appearance.   Neurological:      General: No focal deficit present.      Mental " Status: She is alert and oriented to person, place, and time.   Psychiatric:         Mood and Affect: Mood normal.         Behavior: Behavior normal.         Thought Content: Thought content normal.         Judgment: Judgment normal.               Result Review :                Assessment and Plan  Lucille Mejía is a 47 y.o. female who returns for follow-up of fatigue.  At her previous visit a home sleep test was ordered but has not yet been obtained.  Patient continues to have quite a bit of difficulty with daytime fatigue, nonrestorative sleep, and symptoms including snoring that are concerning for sleep disordered breathing/obstructive sleep apnea.  Patient last had a sleep study in 2019 at [facility] at which time she was found with mild obstructive sleep apnea.  She did not start PAP therapy at that time.  And in lab PSG was recommended but this was about the start of the pandemic.  I have asked her to attempt to find her previous sleep study as we may be able to forego another sleep test and order PAP therapy.  For now, I will order a new home sleep test and have asked to return for follow-up after study for further recommendations.    Diagnoses and all orders for this visit:    1. Obstructive sleep apnea, adult (Primary)  -     Home Sleep Study; Future    2. Snoring  -     Home Sleep Study; Future    3. Excessive daytime sleepiness  -     Home Sleep Study; Future    4. Obesity (BMI 30.0-34.9)                Follow Up  Return for Follow up after study.  Patient was given instructions and counseling regarding her condition or for health maintenance advice. Please see specific information pulled into the AVS if appropriate.       MARGY Russo, ACNP-BC  Pulmonology, Critical Care, and Sleep Medicine

## 2023-11-10 ENCOUNTER — TELEMEDICINE (OUTPATIENT)
Dept: SLEEP MEDICINE | Facility: HOSPITAL | Age: 48
End: 2023-11-10
Payer: COMMERCIAL

## 2023-11-10 VITALS — HEIGHT: 68 IN | BODY MASS INDEX: 31.07 KG/M2 | WEIGHT: 205 LBS

## 2023-11-10 DIAGNOSIS — R06.83 SNORING: ICD-10-CM

## 2023-11-10 DIAGNOSIS — G47.33 OBSTRUCTIVE SLEEP APNEA, ADULT: Primary | ICD-10-CM

## 2023-11-10 DIAGNOSIS — E66.9 OBESITY (BMI 30.0-34.9): ICD-10-CM

## 2023-11-10 DIAGNOSIS — G47.19 EXCESSIVE DAYTIME SLEEPINESS: ICD-10-CM

## 2023-11-13 ENCOUNTER — HOSPITAL ENCOUNTER (OUTPATIENT)
Dept: MAMMOGRAPHY | Facility: HOSPITAL | Age: 48
Discharge: HOME | End: 2023-11-13
Admitting: STUDENT IN AN ORGANIZED HEALTH CARE EDUCATION/TRAINING PROGRAM
Payer: COMMERCIAL

## 2023-11-13 DIAGNOSIS — Z12.31 BREAST CANCER SCREENING BY MAMMOGRAM: ICD-10-CM

## 2023-11-13 PROCEDURE — 77067 SCR MAMMO BI INCL CAD: CPT

## 2023-11-13 PROCEDURE — 77063 BREAST TOMOSYNTHESIS BI: CPT

## 2023-11-16 PROCEDURE — 77063 BREAST TOMOSYNTHESIS BI: CPT | Performed by: RADIOLOGY

## 2023-11-16 PROCEDURE — 77067 SCR MAMMO BI INCL CAD: CPT | Performed by: RADIOLOGY

## 2023-11-16 NOTE — PROGRESS NOTES
Mammogram showed some cystic changes which are typically found to be benign changes, but this requires more imaging to fully characterize these changes. The breast imaging center should reach out to assist in scheduling this. If you don't hear from them in the coming weeks please let us know. Please continue your care as discussed at your last visit.      Best,  Dr. Hernandez

## 2023-11-29 ENCOUNTER — PATIENT MESSAGE (OUTPATIENT)
Dept: INTERNAL MEDICINE | Facility: CLINIC | Age: 48
End: 2023-11-29
Payer: COMMERCIAL

## 2023-11-30 NOTE — TELEPHONE ENCOUNTER
From: Lucille Mejía  To: Irineo Hernandez  Sent: 11/29/2023 7:19 PM EST  Subject: Test for Cancer    Hello Dr. Hernandez,    I hope you are doing well. I have my follow up appointment from the mammogram scheduled for 12/18.    I wanted to ask you about something. I keep seeing advertisements on [employer] for something called Dayanna, which is a test using tears that claims to be able to detect cancer in early stages. Their website is here: https://dayanna.[email or web address]/    I was wondering about this because I have heard that it can be hard for mammograms to detect cancer in women with dense breasts, which I definitely have. I don't normally get my medical advice from [employer], but I wondered if a test like this might provide additional information that could be helpful in getting a correct diagnosis.    Thank you very much,    Lucille Mejía

## 2023-12-18 ENCOUNTER — TRANSCRIBE ORDERS (OUTPATIENT)
Dept: ADMINISTRATIVE | Facility: HOSPITAL | Age: 48
End: 2023-12-18
Payer: COMMERCIAL

## 2023-12-18 ENCOUNTER — HOSPITAL ENCOUNTER (OUTPATIENT)
Dept: MAMMOGRAPHY | Facility: HOSPITAL | Age: 48
Discharge: HOME | End: 2023-12-18
Payer: COMMERCIAL

## 2023-12-18 ENCOUNTER — HOSPITAL ENCOUNTER (OUTPATIENT)
Dept: ULTRASOUND IMAGING | Facility: HOSPITAL | Age: 48
Discharge: HOME | End: 2023-12-18
Payer: COMMERCIAL

## 2023-12-18 DIAGNOSIS — R92.8 ABNORMAL MAMMOGRAM: ICD-10-CM

## 2023-12-18 DIAGNOSIS — R92.8 ABNORMAL MAMMOGRAM: Primary | ICD-10-CM

## 2023-12-18 PROCEDURE — 77066 DX MAMMO INCL CAD BI: CPT | Performed by: RADIOLOGY

## 2023-12-18 PROCEDURE — 77066 DX MAMMO INCL CAD BI: CPT

## 2023-12-18 PROCEDURE — 76642 ULTRASOUND BREAST LIMITED: CPT | Performed by: RADIOLOGY

## 2023-12-18 PROCEDURE — G0279 TOMOSYNTHESIS, MAMMO: HCPCS

## 2023-12-18 PROCEDURE — 77062 BREAST TOMOSYNTHESIS BI: CPT | Performed by: RADIOLOGY

## 2023-12-18 PROCEDURE — 76642 ULTRASOUND BREAST LIMITED: CPT

## 2023-12-19 NOTE — PROGRESS NOTES
Mammogram and US with abnormalities that require further evaluation. Recommend keeping appointment for biopsy to obtain further information on 1/5/24 as scheduled. Please let us know if you have any questions or concerns. Please continue your care as discussed at your last visit.      Best,  Dr. Hernandez

## 2024-01-19 ENCOUNTER — HOSPITAL ENCOUNTER (OUTPATIENT)
Dept: MAMMOGRAPHY | Facility: HOSPITAL | Age: 49
Discharge: HOME | End: 2024-01-19
Payer: COMMERCIAL

## 2024-01-19 DIAGNOSIS — R92.8 ABNORMAL MAMMOGRAM: ICD-10-CM

## 2024-01-19 PROCEDURE — C1819 TISSUE LOCALIZATION-EXCISION: HCPCS

## 2024-01-19 PROCEDURE — 25010000002 LIDOCAINE 1 % SOLUTION: Performed by: STUDENT IN AN ORGANIZED HEALTH CARE EDUCATION/TRAINING PROGRAM

## 2024-01-19 RX ORDER — LIDOCAINE HYDROCHLORIDE AND EPINEPHRINE 10; 10 MG/ML; UG/ML
10 INJECTION, SOLUTION INFILTRATION; PERINEURAL ONCE
Status: COMPLETED | OUTPATIENT
Start: 2024-01-19 | End: 2024-01-19

## 2024-01-19 RX ORDER — LIDOCAINE HYDROCHLORIDE 10 MG/ML
5 INJECTION, SOLUTION INFILTRATION; PERINEURAL ONCE
Status: COMPLETED | OUTPATIENT
Start: 2024-01-19 | End: 2024-01-19

## 2024-01-19 RX ADMIN — Medication 1 ML: at 15:20

## 2024-01-19 RX ADMIN — LIDOCAINE HYDROCHLORIDE,EPINEPHRINE BITARTRATE 10 ML: 10; .01 INJECTION, SOLUTION INFILTRATION; PERINEURAL at 15:20

## 2024-01-19 NOTE — PROGRESS NOTES
Alert and orientated. Denies discomfort, no active bleeding, steri-strips not visualized, gauze dressing intact. e. Cold packs given. Verbalizes and demonstrates understanding of post-care instructions, written copy given.

## 2024-01-21 ENCOUNTER — PATIENT MESSAGE (OUTPATIENT)
Dept: INTERNAL MEDICINE | Facility: CLINIC | Age: 49
End: 2024-01-21
Payer: COMMERCIAL

## 2024-01-22 ENCOUNTER — LAB (OUTPATIENT)
Dept: INTERNAL MEDICINE | Facility: CLINIC | Age: 49
End: 2024-01-22
Payer: COMMERCIAL

## 2024-01-22 DIAGNOSIS — R53.82 CHRONIC FATIGUE: ICD-10-CM

## 2024-01-22 LAB
ALBUMIN SERPL-MCNC: 4.1 G/DL (ref 3.5–5.2)
ALBUMIN/GLOB SERPL: 1.4 G/DL
ALP SERPL-CCNC: 65 U/L (ref 39–117)
ALT SERPL W P-5'-P-CCNC: 9 U/L (ref 1–33)
ANION GAP SERPL CALCULATED.3IONS-SCNC: 13.6 MMOL/L (ref 5–15)
AST SERPL-CCNC: 16 U/L (ref 1–32)
BASOPHILS # BLD AUTO: 0.01 10*3/MM3 (ref 0–0.2)
BASOPHILS NFR BLD AUTO: 0.2 % (ref 0–1.5)
BILIRUB SERPL-MCNC: 0.3 MG/DL (ref 0–1.2)
BUN SERPL-MCNC: 15 MG/DL (ref 6–20)
BUN/CREAT SERPL: 20.5 (ref 7–25)
CALCIUM SPEC-SCNC: 9.9 MG/DL (ref 8.6–10.5)
CHLORIDE SERPL-SCNC: 103 MMOL/L (ref 98–107)
CK SERPL-CCNC: 40 U/L (ref 20–180)
CO2 SERPL-SCNC: 22.4 MMOL/L (ref 22–29)
CORTIS AM PEAK SERPL-MCNC: 13.85 MCG/DL
CREAT SERPL-MCNC: 0.73 MG/DL (ref 0.57–1)
DEPRECATED RDW RBC AUTO: 39.9 FL (ref 37–54)
EGFRCR SERPLBLD CKD-EPI 2021: 102.2 ML/MIN/1.73
EOSINOPHIL # BLD AUTO: 0.28 10*3/MM3 (ref 0–0.4)
EOSINOPHIL NFR BLD AUTO: 4.6 % (ref 0.3–6.2)
ERYTHROCYTE [DISTWIDTH] IN BLOOD BY AUTOMATED COUNT: 12.2 % (ref 12.3–15.4)
ERYTHROCYTE [SEDIMENTATION RATE] IN BLOOD: 4 MM/HR (ref 0–20)
GLOBULIN UR ELPH-MCNC: 2.9 GM/DL
GLUCOSE SERPL-MCNC: 78 MG/DL (ref 65–99)
HCT VFR BLD AUTO: 41.2 % (ref 34–46.6)
HGB BLD-MCNC: 13.7 G/DL (ref 12–15.9)
IMM GRANULOCYTES # BLD AUTO: 0.02 10*3/MM3 (ref 0–0.05)
IMM GRANULOCYTES NFR BLD AUTO: 0.3 % (ref 0–0.5)
LYMPHOCYTES # BLD AUTO: 2.46 10*3/MM3 (ref 0.7–3.1)
LYMPHOCYTES NFR BLD AUTO: 40.3 % (ref 19.6–45.3)
MCH RBC QN AUTO: 30.2 PG (ref 26.6–33)
MCHC RBC AUTO-ENTMCNC: 33.3 G/DL (ref 31.5–35.7)
MCV RBC AUTO: 90.7 FL (ref 79–97)
MONOCYTES # BLD AUTO: 0.56 10*3/MM3 (ref 0.1–0.9)
MONOCYTES NFR BLD AUTO: 9.2 % (ref 5–12)
NEUTROPHILS NFR BLD AUTO: 2.77 10*3/MM3 (ref 1.7–7)
NEUTROPHILS NFR BLD AUTO: 45.4 % (ref 42.7–76)
NRBC BLD AUTO-RTO: 0 /100 WBC (ref 0–0.2)
PLATELET # BLD AUTO: 225 10*3/MM3 (ref 140–450)
PMV BLD AUTO: 10.7 FL (ref 6–12)
POTASSIUM SERPL-SCNC: 4.9 MMOL/L (ref 3.5–5.2)
PROT SERPL-MCNC: 7 G/DL (ref 6–8.5)
RBC # BLD AUTO: 4.54 10*6/MM3 (ref 3.77–5.28)
SODIUM SERPL-SCNC: 139 MMOL/L (ref 136–145)
T4 FREE SERPL-MCNC: 0.92 NG/DL (ref 0.93–1.7)
TSH SERPL DL<=0.05 MIU/L-ACNC: 3.78 UIU/ML (ref 0.27–4.2)
WBC NRBC COR # BLD AUTO: 6.1 10*3/MM3 (ref 3.4–10.8)

## 2024-01-22 PROCEDURE — 84439 ASSAY OF FREE THYROXINE: CPT | Performed by: STUDENT IN AN ORGANIZED HEALTH CARE EDUCATION/TRAINING PROGRAM

## 2024-01-22 PROCEDURE — 82533 TOTAL CORTISOL: CPT | Performed by: STUDENT IN AN ORGANIZED HEALTH CARE EDUCATION/TRAINING PROGRAM

## 2024-01-22 PROCEDURE — 82024 ASSAY OF ACTH: CPT | Performed by: STUDENT IN AN ORGANIZED HEALTH CARE EDUCATION/TRAINING PROGRAM

## 2024-01-22 PROCEDURE — 80050 GENERAL HEALTH PANEL: CPT | Performed by: STUDENT IN AN ORGANIZED HEALTH CARE EDUCATION/TRAINING PROGRAM

## 2024-01-22 PROCEDURE — 82550 ASSAY OF CK (CPK): CPT | Performed by: STUDENT IN AN ORGANIZED HEALTH CARE EDUCATION/TRAINING PROGRAM

## 2024-01-22 PROCEDURE — 85652 RBC SED RATE AUTOMATED: CPT | Performed by: STUDENT IN AN ORGANIZED HEALTH CARE EDUCATION/TRAINING PROGRAM

## 2024-01-22 NOTE — TELEPHONE ENCOUNTER
From: Lucille Mejía  To: Irineo Hernandez  Sent: 1/21/2024 11:05 PM EST  Subject: Test order from psychiatrist    Radha Hernandez,    I am coming in tomorrow (Monday 1/22) to get my cortisol level checked. I also have some orders for blood work from my psychiatrist, Dr. Esha Ledesma. Would it be possible to get this done at [facility] as well, either Monday or another day? I thought this might be easier than getting it done at another facility and having to pass the results around.    I attached the test order.    Thank you,    Lucille

## 2024-01-23 ENCOUNTER — TELEPHONE (OUTPATIENT)
Dept: MAMMOGRAPHY | Facility: HOSPITAL | Age: 49
End: 2024-01-23
Payer: COMMERCIAL

## 2024-01-23 LAB
ACTH PLAS-MCNC: 16.2 PG/ML (ref 7.2–63.3)
CYTO UR: NORMAL
LAB AP CASE REPORT: NORMAL
LAB AP CLINICAL INFORMATION: NORMAL
LAB AP DIAGNOSIS COMMENT: NORMAL
PATH REPORT.FINAL DX SPEC: NORMAL
PATH REPORT.GROSS SPEC: NORMAL

## 2024-01-23 NOTE — TELEPHONE ENCOUNTER
Attempted to noify patient of pathology results and recommendation.  A message was left on her voicemail to return my call.

## 2024-01-23 NOTE — TELEPHONE ENCOUNTER
Patient notified of pathology results and recommendations. Verbalizes understanding. States having some discomfort, using analgesics with some relief. Denies signs and symptoms of infection.     Patient desires to discuss the recommendation with her  and she will call back with him on the phone at a later time.  Questions answered.  Patient verbalized understanding.

## 2024-01-26 ENCOUNTER — TELEPHONE (OUTPATIENT)
Dept: MAMMOGRAPHY | Facility: HOSPITAL | Age: 49
End: 2024-01-26
Payer: COMMERCIAL

## 2024-01-26 ENCOUNTER — OFFICE VISIT (OUTPATIENT)
Dept: INTERNAL MEDICINE | Facility: CLINIC | Age: 49
End: 2024-01-26
Payer: COMMERCIAL

## 2024-01-26 VITALS
DIASTOLIC BLOOD PRESSURE: 78 MMHG | WEIGHT: 202.38 LBS | HEART RATE: 79 BPM | SYSTOLIC BLOOD PRESSURE: 118 MMHG | BODY MASS INDEX: 31.21 KG/M2 | TEMPERATURE: 97.5 F | RESPIRATION RATE: 20 BRPM

## 2024-01-26 DIAGNOSIS — G47.19 EXCESSIVE DAYTIME SLEEPINESS: ICD-10-CM

## 2024-01-26 DIAGNOSIS — E03.8 SUBCLINICAL HYPOTHYROIDISM: Primary | ICD-10-CM

## 2024-01-26 DIAGNOSIS — R92.1 BREAST CALCIFICATION, LEFT: ICD-10-CM

## 2024-01-26 DIAGNOSIS — G47.33 MILD OBSTRUCTIVE SLEEP APNEA: ICD-10-CM

## 2024-01-26 DIAGNOSIS — R53.83 OTHER FATIGUE: ICD-10-CM

## 2024-01-26 PROCEDURE — 99214 OFFICE O/P EST MOD 30 MIN: CPT | Performed by: STUDENT IN AN ORGANIZED HEALTH CARE EDUCATION/TRAINING PROGRAM

## 2024-01-26 RX ORDER — LEVOTHYROXINE SODIUM 0.03 MG/1
25 TABLET ORAL
Qty: 90 TABLET | Refills: 0 | Status: SHIPPED | OUTPATIENT
Start: 2024-01-26

## 2024-01-26 RX ORDER — LURASIDONE HYDROCHLORIDE 20 MG/1
TABLET, FILM COATED ORAL
COMMUNITY
Start: 2023-12-20

## 2024-01-26 NOTE — PROGRESS NOTES
Follow Up Office Visit      Date: 2024   Patient Name: Lucille Mejía  : 1975   MRN: [id number]     Chief Complaint:    Chief Complaint   Patient presents with   • Follow-up     3 month        History of Present Illness: Lucille Mejía is a 48 y.o. female who is here today to follow up with the following problems.    The patient is a 48-year-old female who is here for follow-up. She is accompanied by her .    Breast biopsy.   She has an appointment with the breast surgeon on 2024. She wants her incision to be checked. She denies any discharge or fevers.    Sleep apnea.   She had a sleep study done at . She was frustrated because the doctor wanted to put her on a CPAP, but he had indicated that he had not read the test. She does not want to go on CPAP. Her  uses CPAP. Her hypopnea index was 7.2 and her apnea was 0.2. Her  does not hear a lot of snoring. She rarely wakes up short of breath. She does not usually wake up feeling rested in the morning. She does not wake up with headaches in the morning. Her  has not noticed any sleep movement disorders.    Depression.   She has been battling depression. She went on a leave of absence from her job for a month. She has been getting ketamine therapy for her depression. She had 4 sessions that were good, but the 5th session turned really dark. She sees her psychiatrist, who switched her from [facility] to [facility] a couple of weeks ago. She is looking at going back to work, but it has been hard. She has thoughts of hurting herself, but she would not do anything. She denies any plan. They have a gun, but it is in a safe and she does not know the combination. They have knives, but she would not do it. She has never attempted to take her life in the past. She has never self-harmed. She feels safe at home. She is doing routine talk therapy. Her next appointment is on Monday.  She has very open lines of  communication with her .    Family history.   Her grandmother and cousin have had narcolepsy.        Subjective      Review of Systems:   Review of Systems   Psychiatric/Behavioral:  Positive for sleep disturbance, suicidal ideas and depressed mood. The patient is nervous/anxious.        I have reviewed the patients family history, social history, past medical history, past surgical history and have updated it as appropriate.     Medications:     Current Outpatient Medications:   •  ALPRAZolam (XANAX) 0.5 MG tablet, Take 1 tablet by mouth 3 (Three) Times a Day As Needed., Disp: , Rfl:   •  diphenhydrAMINE (BENADRYL) 25 MG tablet, Take 1 tablet by mouth Every 6 (Six) Hours As Needed for Itching., Disp: , Rfl:   •  divalproex (DEPAKOTE ER) 500 MG 24 hr tablet, Take 2 tablets by mouth Daily., Disp: , Rfl:   •  escitalopram (LEXAPRO) 20 MG tablet, Take  by mouth., Disp: , Rfl:   •  Ketotifen Fumarate powder, , Disp: , Rfl:   •  Lurasidone HCl (LATUDA) 20 MG tablet tablet, TAKE 1 TABLET BY MOUTH ONCE DAILY IN THE EVENING WITH FOOD, Disp: , Rfl:   •  NALTREXONE HCL PO, Take 0.25 mg by mouth Daily., Disp: , Rfl:   •  SUMAtriptan (IMITREX) 100 MG tablet, Take one tablet at onset of headache. May repeat dose one time in 2 hours if headache not relieved., Disp: 36 tablet, Rfl: 3    Allergies:   Allergies   Allergen Reactions   • Amoxicillin-Pot Clavulanate GI Intolerance   • Dexamethasone Mental Status Change     Manic episodes   • Gluten Meal Rash   • Sulfa Antibiotics Hives       Objective     Physical Exam: Please see above  Vital Signs:   Vitals:    01/26/24 1522   BP: 118/78   BP Location: Left arm   Patient Position: Sitting   Cuff Size: Adult   Pulse: 79   Resp: 20   Temp: 97.5 °F (36.4 °C)   TempSrc: Temporal   Weight: 91.8 kg (202 lb 6 oz)   PainSc: 0-No pain     Body mass index is 31.21 kg/m².          Physical Exam  Vitals reviewed.   Constitutional:       General: She is not in acute distress.      "Appearance: Normal appearance. She is obese. She is not ill-appearing or toxic-appearing.      Comments:      HENT:      Head: Normocephalic and atraumatic.      Right Ear: External ear normal.      Left Ear: External ear normal.      Nose: Nose normal.      Mouth/Throat:      Mouth: Mucous membranes are moist.   Eyes:      General: No scleral icterus.     Extraocular Movements: Extraocular movements intact.      Pupils: Pupils are equal, round, and reactive to light.   Cardiovascular:      Rate and Rhythm: Normal rate and regular rhythm.      Pulses: Normal pulses.      Heart sounds: No murmur heard.     No friction rub. No gallop.   Pulmonary:      Effort: Pulmonary effort is normal. No respiratory distress.      Breath sounds: Normal breath sounds. No stridor. No wheezing or rales.      Comments: Well-healing 5 mm biopsy site over the lateral superior left breast without surrounding erythema warmth or discharge  Abdominal:      General: Abdomen is flat. There is no distension.   Musculoskeletal:         General: No swelling or tenderness. Normal range of motion.      Cervical back: Normal range of motion. No rigidity.   Skin:     General: Skin is warm and dry.      Capillary Refill: Capillary refill takes less than 2 seconds.      Findings: No erythema or rash.   Neurological:      General: No focal deficit present.      Mental Status: She is alert and oriented to person, place, and time.      Cranial Nerves: No cranial nerve deficit.      Sensory: No sensory deficit.      Motor: No weakness.      Gait: Gait normal.   Psychiatric:         Behavior: Behavior normal.         Judgment: Judgment normal.      Comments: Flat affect.  Depressed mood.  Endorses thoughts of suicide but denies any plans.  States \"I never would do that\"         Procedures    Results:   Labs:   TSH   Date Value Ref Range Status   01/22/2024 3.780 0.270 - 4.200 uIU/mL Final        Imaging:   No valid procedures specified.     Assessment / " Plan      Assessment/Plan:   Problem List Items Addressed This Visit       Fatigue    Relevant Orders    TSH    T4, free    Excessive daytime sleepiness    Relevant Orders    Ambulatory Referral to Sleep Medicine (Completed)    Breast calcification, left     Other Visit Diagnoses       Subclinical hypothyroidism    -  Primary    Relevant Medications    levothyroxine (SYNTHROID, LEVOTHROID) 25 MCG tablet    Other Relevant Orders    TSH    T4, free    Mild obstructive sleep apnea        Relevant Orders    Ambulatory Referral to Sleep Medicine (Completed)            1. Chronic fatigue syndrome.  I will start her on a low dose of Synthroid 25 mcg once a day in the morning. She was advised to  out from other medications by a couple of hours and from eating or drinking anything with calcium or anything by at least 30 minutes to 1 hour. I will repeat labs in 6 weeks.    2. Suspected subclinical hypothyroidism.  Her free T4 was barely low at 0.92. I will start her on Synthroid 25 mcg once a day in the morning. I will repeat labs in 6 weeks to see where it is.    3. Depression.  This is chronic, this is worsening.  Currently adjusting medications with her psychiatrist Dr. Ledesma.  She endorses occasional intrusive thoughts of self-harm but denies any plans and is adamant that she would never attempt to take her life or self-harm.  Has never had any suicide attempts or self-harm in the past.  Feels safe at home and voices that she will call 911, speak with her  or seek emergency care if she has any pervasive thoughts of self-harm  She was advised to talk with Dr. Ledesma for a sooner appointment and see if she can have an earlier appointment with her talk therapist.    4. Sleep disorder.  Her sleep study showed mild sleep apnea. I will refer her to a sleep specialist.    Follow-up  The patient will follow up in 3 months.  Follow Up:   Return in about 3 months (around 4/26/2024) for Recheck fatigue, thyroid,  mood.        Irineo Hernandez MD   [facility]    Transcribed from ambient dictation for Irineo Hernandez MD by Shakira Quiroz.  01/26/24   17:08 EST    Patient or patient representative verbalized consent to the visit recording.  I have personally performed the services described in this document as transcribed by the above individual, and it is both accurate and complete.

## 2024-01-26 NOTE — TELEPHONE ENCOUNTER
Patient called in and left a message she desires to see first available surgeon with [facility] for consult appointment. Patient will be notified when an appointment is scheduled.

## 2024-01-26 NOTE — TELEPHONE ENCOUNTER
Patient notified of surgical consult appointment with Dr CARMEN Lyles on 1/26/24 @ 1400. Patient given office contact & location information. Told to bring photo ID, list of prescription & OTC medications, insurance information. May be accompanied by family member or friend for support. Encouraged pt to call back or contact surgeon's office with further questions. Patient verbalized understanding.

## 2024-01-26 NOTE — TELEPHONE ENCOUNTER
Attempted to notify patient of surgical consult appointment, left voicemail with request for return call.

## 2024-02-01 ENCOUNTER — PATIENT MESSAGE (OUTPATIENT)
Dept: INTERNAL MEDICINE | Facility: CLINIC | Age: 49
End: 2024-02-01
Payer: COMMERCIAL

## 2024-02-02 NOTE — TELEPHONE ENCOUNTER
From: Lucille Mejía  To: Irineo Hernandez  Sent: 2/1/2024 8:44 PM EST  Subject: Supplement List    Radha Hernandez,    I wanted to send you my full supplement list. This is so that you have it on file, and also so that you can check that Syntheroid does not conflict with any of them. I have it but have not taken it because I wanted to be sure there weren't any conflicts.    Thank you so much,    Lucille Mejía        In proximity to each meal:    NAME    MANNER    Pepcid AC (10 mg)  1 half-hour before each meal  Perimine    1 half-hour before each meal   Histamine digest (SAVANAH)  2 before each meal  Betaine HCL Pepsin  2 before each meal  Holozyme   3 before each meal  Ionbiome   1 teaspoon before each meal  Allathiamine   2 after each meal  Feverfew   1 after each meal      At other times (or in proximity to some but not all meals):    NAME    MANNER  Lysine    1 in the morning  Vitamin D3 (with K2, 5000 IU) 2 after lunch  5 HTP    2 after lunch and 1 after dinner  Vitamin C (Camu Camu, 1000 mg) 2 with dinner  PH Adjust   1/4 teaspoon at night  Melatonin (.5 mg)  3 one hour before bedtime  Smidge sensitive probiotic 2 at bedtime  Trimag    2 at bedtime  Magnesium Threonate  1 at bedtime  Cerafolin  1 at bedtime

## 2024-02-05 RX ORDER — FAMOTIDINE 10 MG
10 TABLET ORAL 2 TIMES DAILY PRN
COMMUNITY

## 2024-02-05 RX ORDER — ASCORBIC ACID 500 MG
2000 TABLET ORAL DAILY
COMMUNITY

## 2024-02-05 RX ORDER — UREA 10 %
0.5 LOTION (ML) TOPICAL NIGHTLY
COMMUNITY

## 2024-02-26 ENCOUNTER — TRANSCRIBE ORDERS (OUTPATIENT)
Dept: ADMINISTRATIVE | Facility: HOSPITAL | Age: 49
End: 2024-02-26
Payer: COMMERCIAL

## 2024-02-26 DIAGNOSIS — R92.8 ABNORMAL MAMMOGRAM: Primary | ICD-10-CM

## 2024-03-06 ENCOUNTER — HOSPITAL ENCOUNTER (OUTPATIENT)
Dept: MAMMOGRAPHY | Facility: HOSPITAL | Age: 49
Discharge: HOME | End: 2024-03-06
Payer: COMMERCIAL

## 2024-03-06 ENCOUNTER — PATIENT ROUNDING (BHMG ONLY) (OUTPATIENT)
Dept: URGENT CARE | Facility: CLINIC | Age: 49
End: 2024-03-06
Payer: COMMERCIAL

## 2024-03-06 DIAGNOSIS — R92.8 ABNORMAL MAMMOGRAM: ICD-10-CM

## 2024-03-06 PROCEDURE — A4648 IMPLANTABLE TISSUE MARKER: HCPCS

## 2024-03-06 PROCEDURE — 25010000002 LIDOCAINE 1 % SOLUTION: Performed by: RADIOLOGY

## 2024-03-06 RX ORDER — LIDOCAINE HYDROCHLORIDE 10 MG/ML
10 INJECTION, SOLUTION INFILTRATION; PERINEURAL ONCE
Status: COMPLETED | OUTPATIENT
Start: 2024-03-06 | End: 2024-03-06

## 2024-03-06 RX ORDER — LIDOCAINE HYDROCHLORIDE 10 MG/ML
5 INJECTION, SOLUTION INFILTRATION; PERINEURAL ONCE
Status: COMPLETED | OUTPATIENT
Start: 2024-03-06 | End: 2024-03-06

## 2024-03-06 RX ADMIN — LIDOCAINE HYDROCHLORIDE 8 ML: 10 INJECTION, SOLUTION INFILTRATION; PERINEURAL at 09:17

## 2024-03-06 RX ADMIN — Medication 5 ML: at 08:40

## 2024-03-06 NOTE — ED NOTES
Thank you for letting us care for you in your recent visit to our urgent care center. We would love to hear about your experience with us. Was this the first time you have visited our location?    We’re always looking for ways to make our patients’ experiences even better. Do you have any recommendations on ways we may improve?     I appreciate you taking the time to respond. Please be on the lookout for a survey about your recent visit from [employer] via text or email. We would greatly appreciate if you could fill that out and turn it back in. We want your voice to be heard and we value your feedback.   Thank you for choosing [facility] for your healthcare needs.

## 2024-03-12 ENCOUNTER — LAB REQUISITION (OUTPATIENT)
Dept: LAB | Facility: HOSPITAL | Age: 49
End: 2024-03-12
Payer: COMMERCIAL

## 2024-03-12 ENCOUNTER — HOSPITAL ENCOUNTER (OUTPATIENT)
Dept: MAMMOGRAPHY | Facility: HOSPITAL | Age: 49
Discharge: HOME | End: 2024-03-12
Admitting: SURGERY
Payer: COMMERCIAL

## 2024-03-12 DIAGNOSIS — R92.8 OTHER ABNORMAL AND INCONCLUSIVE FINDINGS ON DIAGNOSTIC IMAGING OF BREAST: ICD-10-CM

## 2024-03-12 DIAGNOSIS — R92.8 ABNORMAL MAMMOGRAM: ICD-10-CM

## 2024-03-12 PROCEDURE — 88307 TISSUE EXAM BY PATHOLOGIST: CPT | Performed by: SURGERY

## 2024-03-12 PROCEDURE — 76098 X-RAY EXAM SURGICAL SPECIMEN: CPT

## 2024-03-14 LAB
CYTO UR: NORMAL
LAB AP CASE REPORT: NORMAL
LAB AP CLINICAL INFORMATION: NORMAL
LAB AP DIAGNOSIS COMMENT: NORMAL
PATH REPORT.FINAL DX SPEC: NORMAL
PATH REPORT.GROSS SPEC: NORMAL

## 2024-04-04 ENCOUNTER — TRANSCRIBE ORDERS (OUTPATIENT)
Dept: ADMINISTRATIVE | Facility: HOSPITAL | Age: 49
End: 2024-04-04
Payer: COMMERCIAL

## 2024-04-04 DIAGNOSIS — R92.8 ABNORMAL MAMMOGRAM: Primary | ICD-10-CM

## 2024-04-26 ENCOUNTER — OFFICE VISIT (OUTPATIENT)
Dept: INTERNAL MEDICINE | Facility: CLINIC | Age: 49
End: 2024-04-26
Payer: COMMERCIAL

## 2024-04-26 VITALS
TEMPERATURE: 97 F | WEIGHT: 201 LBS | BODY MASS INDEX: 30.56 KG/M2 | SYSTOLIC BLOOD PRESSURE: 124 MMHG | HEART RATE: 78 BPM | DIASTOLIC BLOOD PRESSURE: 70 MMHG | RESPIRATION RATE: 20 BRPM

## 2024-04-26 DIAGNOSIS — F31.9 BIPOLAR 1 DISORDER: ICD-10-CM

## 2024-04-26 DIAGNOSIS — J30.9 ALLERGIC RHINITIS, UNSPECIFIED SEASONALITY, UNSPECIFIED TRIGGER: Primary | ICD-10-CM

## 2024-04-26 DIAGNOSIS — E03.8 SUBCLINICAL HYPOTHYROIDISM: ICD-10-CM

## 2024-04-26 PROCEDURE — 99214 OFFICE O/P EST MOD 30 MIN: CPT | Performed by: STUDENT IN AN ORGANIZED HEALTH CARE EDUCATION/TRAINING PROGRAM

## 2024-04-26 RX ORDER — CHLORCYCLIZINE HYDROCHLORIDE AND PSEUDOEPHEDRINE HYDROCHLORIDE 25; 60 MG/1; MG/1
1 TABLET ORAL DAILY PRN
Qty: 42 TABLET | Refills: 0 | Status: SHIPPED | OUTPATIENT
Start: 2024-04-26

## 2024-04-26 RX ORDER — LEVOTHYROXINE SODIUM 0.03 MG/1
25 TABLET ORAL
Qty: 90 TABLET | Refills: 1 | Status: SHIPPED | OUTPATIENT
Start: 2024-04-26

## 2024-04-26 NOTE — PROGRESS NOTES
Follow Up Office Visit      Date: 2024   Patient Name: Lucille Mejía  : 1975   MRN: [id number]     Chief Complaint:    Chief Complaint   Patient presents with    Follow-up     3 month recheck fatigue. Mood, and thyroid        History of Present Illness: Lucille Mejía is a 48 y.o. female who is here today to follow up with the following problems.    HPI  History of Present Illness  The patient presents for evaluation of multiple medical concerns.    The patient was only on levothyroxine for a single cycle post-breast biopsy a few months ago. Due to various circumstances, she was unable to undergo repeath thyroid testing and has run out of medication. She reports no discernible difference in her energy levels. She denies experiencing any side effects such as tachycardia, hyperthermia, or diarrhea while on levothyroxine.    The patient has a scheduled appointment with a sleep specialist on 2024.    The patient's mood has shown significant improvement, which she attributes to the efficacy of ketamine. She has undergone two sessions of ketamine and was advised to continue intermittently following the initial six sessions. After her last session, she was symptom-free for two days, but had to park immediately. She has two more sessions remaining, and if necessary, she can return for further sessions.    The patient experienced a cold in 2024, and since then, she has been experiencing intermittent otalgia, particularly upon waking. The pain tends to improve as the day progresses. She has been utilizing Flonase, two sprays once daily, which provides some relief but does not completely resolve the pain. She suspects she may have seasonal allergies but does not take any medication for it. Allergy testing conducted years ago revealed ragweed allergies in the fall. She has previously used Stahist. She performs daily sinus rinses and suspects a persistent fungal infection. If she  does not use the sinus rinse, she develops a sinus infection. She has previously consulted an otolaryngologist for her voice issues. She does not react to steroids. She went to urgent care in 02/2024 and was prescribed a low dose of prednisone, which provided relief for a few days, but she eventually discontinued it due to insomnia. She is no longer taking Mucinex D.      Subjective      Review of Systems:   Review of Systems    I have reviewed the patients family history, social history, past medical history, past surgical history and have updated it as appropriate.     Medications:     Current Outpatient Medications:     5-Hydroxytryptophan (5-HTP PO), Take  by mouth. 2 tablets after lunch and 1 after dinner, Disp: , Rfl:     ALPRAZolam (XANAX) 0.5 MG tablet, Take 1 tablet by mouth 3 (Three) Times a Day As Needed., Disp: , Rfl:     ascorbic acid (VITAMIN C) 500 MG tablet, Take 4 tablets by mouth Daily., Disp: , Rfl:     Digestive Enzymes (BETAINE HCL PO), Take 2 capsules by mouth 2 (Two) Times a Day With Meals., Disp: , Rfl:     diphenhydrAMINE (BENADRYL) 25 MG tablet, Take 1 tablet by mouth Every 6 (Six) Hours As Needed for Itching., Disp: , Rfl:     divalproex (DEPAKOTE ER) 500 MG 24 hr tablet, Take 2 tablets by mouth Daily., Disp: , Rfl:     escitalopram (LEXAPRO) 20 MG tablet, Take  by mouth., Disp: , Rfl:     famotidine (PEPCID) 10 MG tablet, Take 1 tablet by mouth 2 (Two) Times a Day As Needed for Heartburn., Disp: , Rfl:     FEVERFEW PO, Take 1 tablet by mouth 3 (Three) Times a Day With Meals., Disp: , Rfl:     Ketotifen Fumarate powder, , Disp: , Rfl:     levothyroxine (SYNTHROID, LEVOTHROID) 25 MCG tablet, Take 1 tablet by mouth Every Morning., Disp: 90 tablet, Rfl: 0    Lurasidone HCl (LATUDA) 20 MG tablet tablet, TAKE 1 TABLET BY MOUTH ONCE DAILY IN THE EVENING WITH FOOD, Disp: , Rfl:     LYSINE PO, Take 1 tablet by mouth Every Morning., Disp: , Rfl:     melatonin 1 MG tablet, Take 0.5 tablets by mouth  Every Night., Disp: , Rfl:     NALTREXONE HCL PO, Take 0.25 mg by mouth Daily., Disp: , Rfl:     NON FORMULARY, Perimine 1 half hour before each meal, Disp: , Rfl:     NON FORMULARY, Histamine digest (SAVANAH) 2 before each meal, Disp: , Rfl:     NON FORMULARY, Take 3 tablets by mouth 3 (Three) Times a Day Before Meals. Holozyme 3 capsule before each meal, Disp: , Rfl:     NON FORMULARY, Take 1 dose by mouth 3 (Three) Times a Day Before Meals. Ionbiome 1 Tsp before meals, Disp: , Rfl:     NON FORMULARY, Take 2 tablets by mouth 3 (Three) Times a Day With Meals. Allathiamine 2 after each meal, Disp: , Rfl:     NON FORMULARY, pH adjust, 1/4 tsp nightly, Disp: , Rfl:     NON FORMULARY, Smidge sensitive probiotic taking 2 tablet at bedtime, Disp: , Rfl:     NON FORMULARY, Trimag, 2 capsule at bedtime, Disp: , Rfl:     NON FORMULARY, Take 1 tablet by mouth Every Night. Magnesium threonate, Disp: , Rfl:     NON FORMULARY, Take 1 tablet by mouth Every Night. Cerafolin, Disp: , Rfl:     pseudoephedrine-guaifenesin (MUCINEX D)  MG per 12 hr tablet, Take 1/2 to 1 tablet every 12 hours as needed for congestion., Disp: 20 tablet, Rfl: 0    SUMAtriptan (IMITREX) 100 MG tablet, Take one tablet at onset of headache. May repeat dose one time in 2 hours if headache not relieved., Disp: 36 tablet, Rfl: 3    Vitamin D-Vitamin K (K2-D3 5000 PO), Take 2 tablets by mouth Daily., Disp: , Rfl:     Allergies:   Allergies   Allergen Reactions    Tomato Unknown - Low Severity    Amoxicillin-Pot Clavulanate GI Intolerance    Dexamethasone Mental Status Change     Manic episodes    Gluten Meal Rash    Sulfa Antibiotics Hives       Objective     Physical Exam: Please see above  Vital Signs:   Vitals:    04/26/24 1521   BP: 124/70   BP Location: Left arm   Patient Position: Sitting   Cuff Size: Adult   Pulse: 78   Resp: 20   Temp: 97 °F (36.1 °C)   TempSrc: Temporal   Weight: 91.2 kg (201 lb)   PainSc: 0-No pain     Body mass index is 30.56  kg/m².          Physical Exam  Vitals reviewed.   Constitutional:       General: She is not in acute distress.     Appearance: Normal appearance. She is obese. She is not ill-appearing or toxic-appearing.   HENT:      Head: Normocephalic and atraumatic.      Right Ear: External ear normal.      Left Ear: External ear normal.      Ears:      Comments: Mild serous effusion b/l     Nose: Congestion present.      Comments: Inflamed nasal turbinates     Mouth/Throat:      Mouth: Mucous membranes are moist.      Pharynx: No oropharyngeal exudate or posterior oropharyngeal erythema.   Eyes:      General: No scleral icterus.     Extraocular Movements: Extraocular movements intact.      Pupils: Pupils are equal, round, and reactive to light.   Cardiovascular:      Rate and Rhythm: Normal rate and regular rhythm.      Pulses: Normal pulses.      Heart sounds: Normal heart sounds. No murmur heard.     No friction rub. No gallop.   Pulmonary:      Effort: Pulmonary effort is normal. No respiratory distress.      Breath sounds: Normal breath sounds. No stridor. No wheezing, rhonchi or rales.   Abdominal:      General: Abdomen is flat. There is no distension.      Palpations: Abdomen is soft.   Musculoskeletal:      Cervical back: Normal range of motion. No rigidity.   Skin:     General: Skin is warm and dry.      Capillary Refill: Capillary refill takes less than 2 seconds.   Neurological:      General: No focal deficit present.      Mental Status: She is alert and oriented to person, place, and time.   Psychiatric:         Mood and Affect: Mood normal.         Behavior: Behavior normal.         Judgment: Judgment normal.      Comments: Improved mood from prior, improved eye contact       Physical Exam  The patient's ears are mildly inflamed and have clear fluid behind it. The patient's nose is inflamed. There is a little postnasal drip in the throat.      Procedures    Results:   Labs:   TSH   Date Value Ref Range Status    01/22/2024 3.780 0.270 - 4.200 uIU/mL Final      Results        Imaging:   No valid procedures specified.     Assessment / Plan      Assessment/Plan:   Problem List Items Addressed This Visit       Allergic rhinitis - Primary    Relevant Medications    Chlorcyclizine-Pseudoephed (Stahist AD) 25-60 MG tablet    Bipolar 1 disorder    Overview     on [facility]ium; Dr Ledesma  on [place]; Dr Ledesma          Other Visit Diagnoses       Subclinical hypothyroidism        Relevant Medications    levothyroxine (SYNTHROID, LEVOTHROID) 25 MCG tablet    Other Relevant Orders    TSH    T4, free            Assessment & Plan  1. Hypothyroidism.  Chronic, unchanged.  The patient will recommence her levothyroxine regimen, with a prescription refill for a duration of 3 months. Concurrently, laboratory tests will be conducted in 6 weeks to monitor her condition. Should her laboratory results remain within the appropriate range, the current treatment plan will be maintained. However, if the results are not within the desired range, the dosage will be escalated or adjusted accordingly.    2. Chronic sinusitis.  Chronic worsening  The patient has been advised to administer Flonase and sinus rinses. Additionally, a prescription for Stahist has been provided. Should her condition deteriorate, she has been advised to inform me so that a short course of steroids may be considered.    Follow-up  The patient is scheduled for a follow-up visit in 3.5 months.       Follow Up:   Return in about 4 months (around 8/19/2024) for Annual, repeat labs June 3rd..        Irnieo Hernandez MD   [facility]    Patient or patient representative verbalized consent for the use of Ambient Listening during the visit with  Irineo Hernandez MD for chart documentation. 4/26/2024  16:54 EDT

## 2024-06-03 ENCOUNTER — OFFICE VISIT (OUTPATIENT)
Dept: NEUROLOGY | Facility: CLINIC | Age: 49
End: 2024-06-03
Payer: COMMERCIAL

## 2024-06-03 ENCOUNTER — LAB (OUTPATIENT)
Dept: INTERNAL MEDICINE | Facility: CLINIC | Age: 49
End: 2024-06-03
Payer: COMMERCIAL

## 2024-06-03 VITALS
OXYGEN SATURATION: 98 % | HEART RATE: 98 BPM | BODY MASS INDEX: 30.28 KG/M2 | SYSTOLIC BLOOD PRESSURE: 126 MMHG | HEIGHT: 68 IN | WEIGHT: 199.8 LBS | DIASTOLIC BLOOD PRESSURE: 68 MMHG

## 2024-06-03 DIAGNOSIS — E03.8 SUBCLINICAL HYPOTHYROIDISM: ICD-10-CM

## 2024-06-03 DIAGNOSIS — G43.009 MIGRAINE WITHOUT AURA AND WITHOUT STATUS MIGRAINOSUS, NOT INTRACTABLE: Primary | Chronic | ICD-10-CM

## 2024-06-03 PROCEDURE — 84443 ASSAY THYROID STIM HORMONE: CPT | Performed by: STUDENT IN AN ORGANIZED HEALTH CARE EDUCATION/TRAINING PROGRAM

## 2024-06-03 PROCEDURE — 99213 OFFICE O/P EST LOW 20 MIN: CPT | Performed by: PSYCHIATRY & NEUROLOGY

## 2024-06-03 PROCEDURE — 84439 ASSAY OF FREE THYROXINE: CPT | Performed by: STUDENT IN AN ORGANIZED HEALTH CARE EDUCATION/TRAINING PROGRAM

## 2024-06-03 RX ORDER — DIVALPROEX SODIUM 500 MG/1
1000 TABLET, EXTENDED RELEASE ORAL DAILY
Qty: 180 TABLET | Refills: 3 | Status: SHIPPED | OUTPATIENT
Start: 2024-06-03

## 2024-06-03 RX ORDER — CARIPRAZINE 1.5 MG/1
CAPSULE, GELATIN COATED ORAL
COMMUNITY
Start: 2024-05-31 | End: 2024-06-03

## 2024-06-03 RX ORDER — SUMATRIPTAN 100 MG/1
TABLET, FILM COATED ORAL
Qty: 36 TABLET | Refills: 3 | Status: SHIPPED | OUTPATIENT
Start: 2024-06-03

## 2024-06-03 NOTE — PROGRESS NOTES
"Chief Complaint    Migraine    Subjective        [facility] to [facility] NEUROLOGY  History of Present Illness    48 y.o. female returns in follow up.  Last visit on 11/28/22 continued Depakote, Nurtec, Imitrex.       Imitrex aborts in 30 minutes.       Four HA days a month.       Holocranial location.       Quality is throbbing.  Intensity is moderate to severe.     Imitrex and Cephaly.       Depakote ER 1000 mg daily 6 weeks ago.       Reviewed medical records:     Migraines can last hours to days.  Depakote for BPAD and migraines.       Depakote effective in past.       Started in 2015.  Quality is pressure, stabbing, throbbing.      5 - 6 x month.       Assoc sx of photo/phono.          Objective   Vital Signs:  /68   Pulse 98   Ht 172.7 cm (67.99\")   Wt 90.6 kg (199 lb 12.8 oz)   SpO2 98%   BMI 30.39 kg/m²   Estimated body mass index is 30.39 kg/m² as calculated from the following:    Height as of this encounter: 172.7 cm (67.99\").    Weight as of this encounter: 90.6 kg (199 lb 12.8 oz).       Neurologic Exam     Mental Status   Oriented to person, place, and time.   Speech: speech is normal   Level of consciousness: alert  Knowledge: good and consistent with education.   Normal comprehension.     Cranial Nerves   Cranial nerves II through XII intact.     CN II   Visual fields full to confrontation.   Visual acuity: normal  Right visual field deficit: none  Left visual field deficit: none     CN III, IV, VI   Pupils are equal, round, and reactive to light.  Extraocular motions are normal.   Nystagmus: none   Diplopia: none  Ophthalmoparesis: none  Upgaze: normal  Downgaze: normal  Conjugate gaze: present    CN V   Facial sensation intact.   Right corneal reflex: normal  Left corneal reflex: normal    CN VII   Right facial weakness: none  Left facial weakness: none    CN VIII   Hearing: intact    CN IX, X   Palate: symmetric  Right gag reflex: normal  Left " gag reflex: normal    CN XI   Right sternocleidomastoid strength: normal  Left sternocleidomastoid strength: normal    CN XII   Tongue: not atrophic  Fasciculations: absent  Tongue deviation: none    Motor Exam   Muscle bulk: normal  Overall muscle tone: normal    Strength   Strength 5/5 throughout.     Sensory Exam   Light touch normal.     Gait, Coordination, and Reflexes     Gait  Gait: normal    Tremor   Resting tremor: absent  Intention tremor: absent  Action tremor: absent    Reflexes   Reflexes 2+ except as noted.            Physical Exam  Eyes:      Extraocular Movements: EOM normal.      Pupils: Pupils are equal, round, and reactive to light.   Neurological:      Mental Status: She is oriented to person, place, and time.      Cranial Nerves: Cranial nerves 2-12 are intact.      Motor: Motor strength is normal.     Gait: Gait is intact.   Psychiatric:         Speech: Speech normal.        Result Review :    The following data was reviewed by: Jesus Singh MD on 06/03/2024:  Common labs          8/18/2023    16:01 1/22/2024    08:41   Common Labs   Glucose 119  78    BUN 12  15    Creatinine 0.58  0.73    Sodium 139  139    Potassium 4.3  4.9    Chloride 106  103    Calcium 9.5  9.9    Albumin 4.0  4.1    Total Bilirubin 0.3  0.3    Alkaline Phosphatase 61  65    AST (SGOT) 13  16    ALT (SGPT) 11  9    WBC 5.37  6.10    Hemoglobin 13.2  13.7    Hematocrit 38.8  41.2    Platelets 246  225    Total Cholesterol 205     Triglycerides 212     HDL Cholesterol 33     LDL Cholesterol  134                    Assessment and Plan     Diagnoses and all orders for this visit:    1. Migraine without aura and without status migrainosus, not intractable (Primary)  Assessment & Plan:  Controlled on Depakote and Imitrex.            Orders:  -     SUMAtriptan (IMITREX) 100 MG tablet; Take one tablet at onset of headache. May repeat dose one time in 2 hours if headache not relieved.  Dispense: 36 tablet; Refill: 3    Other  orders  -     divalproex (DEPAKOTE ER) 500 MG 24 hr tablet; Take 2 tablets by mouth Daily.  Dispense: 180 tablet; Refill: 3             Follow Up     No follow-ups on file.  Patient was given instructions and counseling regarding her condition or for health maintenance advice. Please see specific information pulled into the AVS if appropriate.

## 2024-06-04 LAB
T4 FREE SERPL-MCNC: 1.15 NG/DL (ref 0.92–1.68)
TSH SERPL DL<=0.05 MIU/L-ACNC: 1.2 UIU/ML (ref 0.27–4.2)

## 2024-07-03 ENCOUNTER — APPOINTMENT (OUTPATIENT)
Facility: HOSPITAL | Age: 49
End: 2024-07-03
Payer: COMMERCIAL

## 2024-07-03 ENCOUNTER — HOSPITAL ENCOUNTER (EMERGENCY)
Facility: HOSPITAL | Age: 49
Discharge: HOME | End: 2024-07-03
Attending: FAMILY MEDICINE
Payer: COMMERCIAL

## 2024-07-03 VITALS
SYSTOLIC BLOOD PRESSURE: 106 MMHG | DIASTOLIC BLOOD PRESSURE: 73 MMHG | BODY MASS INDEX: 30.31 KG/M2 | HEART RATE: 81 BPM | OXYGEN SATURATION: 99 % | RESPIRATION RATE: 16 BRPM | TEMPERATURE: 98.1 F | WEIGHT: 200 LBS | HEIGHT: 68 IN

## 2024-07-03 DIAGNOSIS — R53.81 MALAISE AND FATIGUE: Primary | ICD-10-CM

## 2024-07-03 DIAGNOSIS — G47.33 OBSTRUCTIVE SLEEP APNEA SYNDROME: ICD-10-CM

## 2024-07-03 DIAGNOSIS — R53.83 MALAISE AND FATIGUE: Primary | ICD-10-CM

## 2024-07-03 DIAGNOSIS — R06.00 DYSPNEA, UNSPECIFIED TYPE: ICD-10-CM

## 2024-07-03 LAB
ALBUMIN SERPL-MCNC: 4.5 G/DL (ref 3.5–5.2)
ALBUMIN/GLOB SERPL: 1.4 G/DL
ALP SERPL-CCNC: 93 U/L (ref 39–117)
ALT SERPL W P-5'-P-CCNC: <5 U/L (ref 1–33)
ANION GAP SERPL CALCULATED.3IONS-SCNC: 10.6 MMOL/L (ref 5–15)
AST SERPL-CCNC: 17 U/L (ref 1–32)
BASOPHILS # BLD AUTO: 0.02 10*3/MM3 (ref 0–0.2)
BASOPHILS NFR BLD AUTO: 0.2 % (ref 0–1.5)
BILIRUB SERPL-MCNC: 0.2 MG/DL (ref 0–1.2)
BUN SERPL-MCNC: 17 MG/DL (ref 6–20)
BUN/CREAT SERPL: 28.3 (ref 7–25)
CALCIUM SPEC-SCNC: 10.2 MG/DL (ref 8.6–10.5)
CHLORIDE SERPL-SCNC: 100 MMOL/L (ref 98–107)
CO2 SERPL-SCNC: 25.4 MMOL/L (ref 22–29)
CREAT SERPL-MCNC: 0.6 MG/DL (ref 0.57–1)
D DIMER PPP FEU-MCNC: 0.31 MCGFEU/ML (ref 0–0.5)
DEPRECATED RDW RBC AUTO: 42.4 FL (ref 37–54)
EGFRCR SERPLBLD CKD-EPI 2021: 111.6 ML/MIN/1.73
EOSINOPHIL # BLD AUTO: 0.35 10*3/MM3 (ref 0–0.4)
EOSINOPHIL NFR BLD AUTO: 3.9 % (ref 0.3–6.2)
ERYTHROCYTE [DISTWIDTH] IN BLOOD BY AUTOMATED COUNT: 12.4 % (ref 12.3–15.4)
FLUAV RNA RESP QL NAA+PROBE: NOT DETECTED
FLUBV RNA RESP QL NAA+PROBE: NOT DETECTED
GLOBULIN UR ELPH-MCNC: 3.2 GM/DL
GLUCOSE SERPL-MCNC: 94 MG/DL (ref 65–99)
HCT VFR BLD AUTO: 44 % (ref 34–46.6)
HGB BLD-MCNC: 14.5 G/DL (ref 12–15.9)
HOLD SPECIMEN: NORMAL
IMM GRANULOCYTES # BLD AUTO: 0.06 10*3/MM3 (ref 0–0.05)
IMM GRANULOCYTES NFR BLD AUTO: 0.7 % (ref 0–0.5)
LYMPHOCYTES # BLD AUTO: 2.78 10*3/MM3 (ref 0.7–3.1)
LYMPHOCYTES NFR BLD AUTO: 31 % (ref 19.6–45.3)
MCH RBC QN AUTO: 30.1 PG (ref 26.6–33)
MCHC RBC AUTO-ENTMCNC: 33 G/DL (ref 31.5–35.7)
MCV RBC AUTO: 91.3 FL (ref 79–97)
MONOCYTES # BLD AUTO: 0.84 10*3/MM3 (ref 0.1–0.9)
MONOCYTES NFR BLD AUTO: 9.4 % (ref 5–12)
NEUTROPHILS NFR BLD AUTO: 4.92 10*3/MM3 (ref 1.7–7)
NEUTROPHILS NFR BLD AUTO: 54.8 % (ref 42.7–76)
NT-PROBNP SERPL-MCNC: <36 PG/ML (ref 0–450)
PLATELET # BLD AUTO: 258 10*3/MM3 (ref 140–450)
PMV BLD AUTO: 10.1 FL (ref 6–12)
POTASSIUM SERPL-SCNC: 4.4 MMOL/L (ref 3.5–5.2)
PROT SERPL-MCNC: 7.7 G/DL (ref 6–8.5)
RBC # BLD AUTO: 4.82 10*6/MM3 (ref 3.77–5.28)
RSV RNA RESP QL NAA+PROBE: NOT DETECTED
SARS-COV-2 RNA RESP QL NAA+PROBE: NOT DETECTED
SODIUM SERPL-SCNC: 136 MMOL/L (ref 136–145)
TROPONIN T SERPL HS-MCNC: <6 NG/L
WBC NRBC COR # BLD AUTO: 8.97 10*3/MM3 (ref 3.4–10.8)
WHOLE BLOOD HOLD COAG: NORMAL
WHOLE BLOOD HOLD SPECIMEN: NORMAL

## 2024-07-03 PROCEDURE — 71045 X-RAY EXAM CHEST 1 VIEW: CPT

## 2024-07-03 PROCEDURE — 93005 ELECTROCARDIOGRAM TRACING: CPT

## 2024-07-03 PROCEDURE — 83880 ASSAY OF NATRIURETIC PEPTIDE: CPT | Performed by: FAMILY MEDICINE

## 2024-07-03 PROCEDURE — 84484 ASSAY OF TROPONIN QUANT: CPT | Performed by: FAMILY MEDICINE

## 2024-07-03 PROCEDURE — 85025 COMPLETE CBC W/AUTO DIFF WBC: CPT | Performed by: FAMILY MEDICINE

## 2024-07-03 PROCEDURE — 93005 ELECTROCARDIOGRAM TRACING: CPT | Performed by: FAMILY MEDICINE

## 2024-07-03 PROCEDURE — 85379 FIBRIN DEGRADATION QUANT: CPT | Performed by: FAMILY MEDICINE

## 2024-07-03 PROCEDURE — 99284 EMERGENCY DEPT VISIT MOD MDM: CPT

## 2024-07-03 PROCEDURE — 80053 COMPREHEN METABOLIC PANEL: CPT | Performed by: FAMILY MEDICINE

## 2024-07-03 PROCEDURE — 87637 SARSCOV2&INF A&B&RSV AMP PRB: CPT | Performed by: FAMILY MEDICINE

## 2024-07-03 RX ORDER — SODIUM CHLORIDE 0.9 % (FLUSH) 0.9 %
10 SYRINGE (ML) INJECTION AS NEEDED
Status: DISCONTINUED | OUTPATIENT
Start: 2024-07-03 | End: 2024-07-04 | Stop reason: HOSPADM

## 2024-07-04 NOTE — FSED PROVIDER NOTE
"Subjective  History of Present Illness:    48-year-old female with medical history concerning for bipolar disorder presents today for evaluation of worsening of malaise, fatigue and shortness of breath over the past several months.  Patient was recently diagnosed with sleep apnea but has not gotten her CPAP yet.  She has worsening shortness of breath with ambulation, going up steps with activity.  Her symptoms are relieved by rest.  Patient denies fever, chills, headache, cough, runny nose, chest pain, nausea, vomiting, diarrhea, abdominal pain, weight gain, weight loss or dysuria      Nurses Notes reviewed and agree, including vitals, allergies, social history and prior medical history.     REVIEW OF SYSTEMS: All systems reviewed and not pertinent unless noted.  Review of Systems   Constitutional:  Positive for fatigue. Negative for chills and fever.   HENT:  Negative for ear pain, rhinorrhea, sinus pain and sore throat.    Respiratory:  Positive for shortness of breath. Negative for cough.    Cardiovascular:  Negative for chest pain, palpitations and leg swelling.   Gastrointestinal:  Negative for diarrhea, nausea and vomiting.   Neurological:  Negative for syncope, weakness, light-headedness and headaches.   All other systems reviewed and are negative.      Past Medical History:   Diagnosis Date    Ankle sprain 1997    Left ankle.  Fell down a flight of stairs, bad sprain.    Asthma 2015    Bipolar 1 disorder     Cervical disc disorder     Depression     Fracture of ankle 2020    Left fibula    Fracture, fibula Nov 2020    Headache 2019    Hyperlipidemia 2022    Knee sprain 1998    Left knee    Low back strain 2020    Pain in sciatic nerve    Neuroma of foot 1996    Right foot, four cryosurgeries.    Personal history of other medical treatment 12/2020    pt states she has \"histamine intolerance\"    Scoliosis 2004    Possible    Stress fracture 2020    Of left fibula    Tennis elbow 2004    From playing piano, " "severe injury       Allergies:    Tomato, Amoxicillin-pot clavulanate, Dexamethasone, Gluten meal, and Sulfa antibiotics      Past Surgical History:   Procedure Laterality Date    FOOT SURGERY Right     Love's neuroma    LIPOMA EXCISION           Social History     Socioeconomic History    Marital status:    Tobacco Use    Smoking status: Never     Passive exposure: Never    Smokeless tobacco: Never   Vaping Use    Vaping status: Never Used   Substance and Sexual Activity    Alcohol use: No    Drug use: Never    Sexual activity: Yes     Partners: Male     Comment: TTC         Family History   Problem Relation Age of Onset    Depression Mother     Vision loss Mother     Anxiety disorder Father     Depression Father     Hearing loss Father     Hyperlipidemia Father     Stroke Father     Breast cancer Paternal Grandmother     Cancer Paternal Grandmother         post-menopausal    Narcolepsy Paternal Grandmother     Breast cancer Maternal Aunt     Cancer Maternal Aunt         post-menopausal    Breast cancer Paternal Aunt     Cancer Paternal Aunt         post-menopausal    Mental illness Paternal Aunt         Bipolar    Mental illness Paternal Aunt         Cyclothemia    Developmental Disability Paternal Uncle     Narcolepsy Paternal Cousin        Objective  Physical Exam:  /73 (BP Location: Left arm, Patient Position: Sitting)   Pulse 74   Temp 98.1 °F (36.7 °C) (Oral)   Resp 16   Ht 172.7 cm (68\")   Wt 90.7 kg (200 lb)   LMP 06/25/2024 (Approximate)   SpO2 96%   BMI 30.41 kg/m²      Physical Exam  Vitals and nursing note reviewed.   Constitutional:       Appearance: Normal appearance. She is well-developed. She is obese.   HENT:      Head: Normocephalic and atraumatic.      Right Ear: Tympanic membrane, ear canal and external ear normal.      Left Ear: Tympanic membrane, ear canal and external ear normal.      Nose: Nose normal.      Mouth/Throat:      Mouth: Mucous membranes are moist.      " Pharynx: Oropharynx is clear.   Eyes:      Extraocular Movements: Extraocular movements intact.      Conjunctiva/sclera: Conjunctivae normal.      Pupils: Pupils are equal, round, and reactive to light.   Cardiovascular:      Rate and Rhythm: Normal rate and regular rhythm.      Pulses: Normal pulses.      Heart sounds: Normal heart sounds.   Pulmonary:      Effort: Pulmonary effort is normal.      Breath sounds: Normal breath sounds. No decreased breath sounds, wheezing or rhonchi.   Abdominal:      General: Abdomen is flat. Bowel sounds are normal.      Palpations: Abdomen is soft.   Musculoskeletal:         General: Normal range of motion.      Cervical back: Normal range of motion and neck supple.   Skin:     General: Skin is warm.      Capillary Refill: Capillary refill takes less than 2 seconds.   Neurological:      General: No focal deficit present.      Mental Status: She is alert and oriented to person, place, and time. Mental status is at baseline.   Psychiatric:         Mood and Affect: Mood normal.         Behavior: Behavior normal.         Thought Content: Thought content normal.         Judgment: Judgment normal.         Procedures    ED Course:    ED Course as of 07/03/24 2234 Wed Jul 03, 2024 2048 EKG interpreted by me-sinus rhythm, rate 84 intervals within normal limits, T wave inversions noted lead III and V3; no ST changes concerning for ischemia.  Abnormal EKG without acute change [HR]      ED Course User Index  [HR] Augusto Scott MD       Lab Results (last 24 hours)       Procedure Component Value Units Date/Time    CBC & Differential [[id number]]  (Abnormal) Collected: 07/03/24 2101    Specimen: Blood Updated: 07/03/24 2110    Narrative:      The following orders were created for panel order CBC & Differential.  Procedure                               Abnormality         Status                     ---------                               -----------         ------                     CBC  Auto Differential[[id number]]        Abnormal            Final result                 Please view results for these tests on the individual orders.    Comprehensive Metabolic Panel [[id number]]  (Abnormal) Collected: 07/03/24 2101    Specimen: Blood Updated: 07/03/24 2129     Glucose 94 mg/dL      BUN 17 mg/dL      Creatinine 0.60 mg/dL      Sodium 136 mmol/L      Potassium 4.4 mmol/L      Chloride 100 mmol/L      CO2 25.4 mmol/L      Calcium 10.2 mg/dL      Total Protein 7.7 g/dL      Albumin 4.5 g/dL      ALT (SGPT) <5 U/L      AST (SGOT) 17 U/L      Alkaline Phosphatase 93 U/L      Total Bilirubin 0.2 mg/dL      Globulin 3.2 gm/dL      A/G Ratio 1.4 g/dL      BUN/Creatinine Ratio 28.3     Anion Gap 10.6 mmol/L      eGFR 111.6 mL/min/1.73     Narrative:      GFR Normal >60  Chronic Kidney Disease <60  Kidney Failure <15      BNP [[id number]]  (Normal) Collected: 07/03/24 2101    Specimen: Blood Updated: 07/03/24 2127     proBNP <36.0 pg/mL     Narrative:      This assay is used as an aid in the diagnosis of individuals suspected of having heart failure. It can be used as an aid in the diagnosis of acute decompensated heart failure (ADHF) in patients presenting with signs and symptoms of ADHF to the emergency department (ED). In addition, NT-proBNP of <300 pg/mL indicates ADHF is not likely.    Age Range Result Interpretation  NT-proBNP Concentration (pg/mL:      <50             Positive            >450                   Gray                 300-450                    Negative             <300    50-75           Positive            >900                  Gray                300-900                  Negative            <300      >75             Positive            >1800                  Gray                300-1800                  Negative            <300    Single High Sensitivity Troponin T [[id number]]  (Normal) Collected: 07/03/24 2101    Specimen: Blood Updated: 07/03/24 2126     HS Troponin T <6 ng/L     CBC Auto  "Differential [[id number]]  (Abnormal) Collected: 07/03/24 2101    Specimen: Blood Updated: 07/03/24 2110     WBC 8.97 10*3/mm3      RBC 4.82 10*6/mm3      Hemoglobin 14.5 g/dL      Hematocrit 44.0 %      MCV 91.3 fL      MCH 30.1 pg      MCHC 33.0 g/dL      RDW 12.4 %      RDW-SD 42.4 fl      MPV 10.1 fL      Platelets 258 10*3/mm3      Neutrophil % 54.8 %      Lymphocyte % 31.0 %      Monocyte % 9.4 %      Eosinophil % 3.9 %      Basophil % 0.2 %      Immature Grans % 0.7 %      Neutrophils, Absolute 4.92 10*3/mm3      Lymphocytes, Absolute 2.78 10*3/mm3      Monocytes, Absolute 0.84 10*3/mm3      Eosinophils, Absolute 0.35 10*3/mm3      Basophils, Absolute 0.02 10*3/mm3      Immature Grans, Absolute 0.06 10*3/mm3     D-dimer, Quantitative [[id number]]  (Normal) Collected: 07/03/24 2101    Specimen: Blood Updated: 07/03/24 2142     D-Dimer, Quantitative 0.31 MCGFEU/mL     Narrative:      According to the assay 's published package insert, a normal (<0.50 MCGFEU/mL) D-dimer result in conjunction with a non-high clinical probability assessment, excludes deep vein thrombosis (DVT) and pulmonary embolism (PE) with high sensitivity.    D-dimer values increase with age and this can make VTE exclusion of an older population difficult. To address this, the [employer], based on best available evidence and recent guidelines, recommends that clinicians use age-adjusted D-dimer thresholds in patients greater than 50 years of age with: a) a low probability of PE who do not meet all Pulmonary Embolism Rule Out Criteria, or b) in those with intermediate probability of PE.   The formula for an age-adjusted D-dimer cut-off is \"age/100\".  For example, a 60 year old patient would have an age-adjusted cut-off of 0.60 MCGFEU/mL and an 80 year old 0.80 MCGFEU/mL.    COVID-19, FLU A/B, RSV PCR 1 HR TAT - Swab, Nasopharynx [[id number]]  (Normal) Collected: 07/03/24 2145    Specimen: Swab from Nasopharynx " Updated: 07/03/24 2228     COVID19 Not Detected     Influenza A PCR Not Detected     Influenza B PCR Not Detected     RSV, PCR Not Detected    Narrative:      Fact sheet for providers: https://www.fda.gov/media/437984/download    Fact sheet for patients: https://www.fda.gov/media/220307/download    Test performed by PCR.             XR Chest 1 View    Result Date: 7/3/2024  XR CHEST 1 VW Date of Exam: 7/3/2024 8:51 PM EDT Indication: SOA triage protocol Comparison: None available Findings: The cardiomediastinal silhouette is within normal limits. Pulmonary vascularity appears normal. There is no focal airspace consolidation, pleural effusion, or pneumothorax. There are no acute osseous findings of the chest.     Impression: Impression: 1. No acute cardiopulmonary abnormality. Electronically Signed: Haresh Claire  7/3/2024 9:26 PM EDT  Workstation ID: [id number]        [facility]     Amount and/or Complexity of Data Reviewed  Clinical lab tests: reviewed  Tests in the radiology section of CPT®: reviewed  Tests in the medicine section of CPT®: reviewed        Initial impression of presenting illness: 48-year-old female with dyspnea on exertion and fatigue    DDX: includes but is not limited to: Heart failure, pneumonia,    Patient arrives ambulatory, afebrile, normotensive with heart rate 85 with vitals interpreted by myself.     Pertinent features from physical exam: Normal exam.    Initial diagnostic plan: Basic labs, cardiac labs, chest x-ray    Results from initial plan were reviewed and interpreted by me revealing normal workup    Diagnostic information from other sources: None    Interventions / Re-evaluation: None    Medications   sodium chloride 0.9 % flush 10 mL (has no administration in time range)       Results/clinical rationale were discussed with patient    Consultations/Discussion of results with other physicians: None    Data interpreted: Nursing notes reviewed, vital signs reviewed.  Labs independently  interpreted by me (CBC, CMP, troponin).  Imaging independently interpreted by me (x-ray).  EKG independently interpreted by me.  O2 saturation: 96% room air    Counseling: Discussed the results above with the patient regarding need for admission or discharge.  Patient understands and agrees plan of care.      -----  ED Disposition       ED Disposition   Discharge    Condition   Stable    Comment   --             Final diagnoses:   Malaise and fatigue   Dyspnea, unspecified type   Obstructive sleep apnea syndrome      Your Follow-Up Providers       Irineo Hernandez MD. Schedule an appointment as soon as possible for a visit in 3 days.    Specialty: Internal Medicine  Follow up details: If symptoms worsen, For further evaluation  [address]  [place] [id number] [phone]                       Contact information for after-discharge care    Follow-up information has not been specified.                    Your medication list        CONTINUE taking these medications        Instructions Last Dose Given Next Dose Due   5-HTP PO      Take  by mouth. 2 tablets after lunch and 1 after dinner       ALPRAZolam 0.5 MG tablet  Commonly known as: XANAX      Take 1 tablet by mouth 3 (Three) Times a Day As Needed.       ascorbic acid 500 MG tablet  Commonly known as: VITAMIN C      Take 4 tablets by mouth Daily.       diphenhydrAMINE 25 MG tablet  Commonly known as: BENADRYL      Take 1 tablet by mouth Every 6 (Six) Hours As Needed for Itching.       divalproex 500 MG 24 hr tablet  Commonly known as: DEPAKOTE ER      Take 2 tablets by mouth Daily.       escitalopram 20 MG tablet  Commonly known as: LEXAPRO      Take  by mouth.       famotidine 10 MG tablet  Commonly known as: PEPCID      Take 1 tablet by mouth 2 (Two) Times a Day As Needed for Heartburn.       FEVERFEW PO      Take 1 tablet by mouth 3 (Three) Times a Day With Meals.       K2-D3 5000 PO      Take 2 tablets by mouth Daily.       levothyroxine  25 MCG tablet  Commonly known as: SYNTHROID, LEVOTHROID      Take 1 tablet by mouth Every Morning.       Lurasidone HCl 20 MG tablet tablet  Commonly known as: LATUDA      TAKE 1 TABLET BY MOUTH ONCE DAILY IN THE EVENING WITH FOOD       melatonin 1 MG tablet      Take 0.5 tablets by mouth Every Night.       Stahist AD 25-60 MG tablet  Generic drug: Chlorcyclizine-Pseudoephed      Take 1 tablet by mouth Daily As Needed (allergies).       SUMAtriptan 100 MG tablet  Commonly known as: IMITREX      Take one tablet at onset of headache. May repeat dose one time in 2 hours if headache not relieved.

## 2024-07-07 LAB
QT INTERVAL: 364 MS
QTC INTERVAL: 430 MS

## 2024-07-26 ENCOUNTER — OFFICE VISIT (OUTPATIENT)
Dept: INTERNAL MEDICINE | Facility: CLINIC | Age: 49
End: 2024-07-26
Payer: COMMERCIAL

## 2024-07-26 VITALS
RESPIRATION RATE: 14 BRPM | HEART RATE: 62 BPM | OXYGEN SATURATION: 96 % | WEIGHT: 208.8 LBS | DIASTOLIC BLOOD PRESSURE: 62 MMHG | BODY MASS INDEX: 31.75 KG/M2 | SYSTOLIC BLOOD PRESSURE: 108 MMHG | TEMPERATURE: 97.1 F

## 2024-07-26 DIAGNOSIS — R06.00 DYSPNEA, UNSPECIFIED TYPE: ICD-10-CM

## 2024-07-26 DIAGNOSIS — D89.40 MAST CELL ACTIVATION SYNDROME: Primary | ICD-10-CM

## 2024-07-26 PROCEDURE — 99214 OFFICE O/P EST MOD 30 MIN: CPT | Performed by: PHYSICIAN ASSISTANT

## 2024-07-26 RX ORDER — CEPHALEXIN 500 MG/1
500 CAPSULE ORAL
COMMUNITY
Start: 2024-07-20 | End: 2024-07-27

## 2024-07-26 NOTE — PROGRESS NOTES
"    Office Note     Name: Lucille Mejía    : 1975     MRN: [id number]     Chief Complaint  Follow-up (Hospital follow up for shortness of breath)    Subjective     History of Present Illness:  Lucille Mejía is a 48 y.o. female who presents today for ER follow up.    Patient reports she is overall doing well, she thinks her ER visit was likely secondary to Mast cell activation flare of symptoms.  Patient reports she still has some dyspnea with exertion.     D-dimer, BNP, tropinin were within normal limits.     Chest xray and EKG were within normal limits.    Lab Results   Component Value Date    GLUCOSE 94 2024    BUN 17 2024    CREATININE 0.60 2024    EGFR 111.6 2024    BCR 28.3 (H) 2024    K 4.4 2024    CO2 25.4 2024    CALCIUM 10.2 2024    ALBUMIN 4.5 2024    BILITOT 0.2 2024    AST 17 2024    ALT <5 2024       Lab Results   Component Value Date    WBC 8.97 2024    HGB 14.5 2024    HCT 44.0 2024    MCV 91.3 2024     2024         Review of Systems:   Review of Systems    Past Medical History:   Past Medical History:   Diagnosis Date    Ankle sprain     Left ankle.  Fell down a flight of stairs, bad sprain.    Asthma     Bipolar 1 disorder     Cervical disc disorder     Depression     Fracture of ankle     Left fibula    Fracture, fibula 2020    Headache 2019    Hyperlipidemia     Knee sprain     Left knee    Low back strain     Pain in sciatic nerve    Neuroma of foot     Right foot, four cryosurgeries.    Personal history of other medical treatment 2020    pt states she has \"histamine intolerance\"    Scoliosis 2004    Possible    Stress fracture     Of left fibula    Tennis elbow     From playing piano, severe injury       Past Surgical History:   Past Surgical History:   Procedure Laterality Date    FOOT SURGERY Right     Love's " neuroma    LIPOMA EXCISION         Immunizations:   Immunization History   Administered Date(s) Administered    COVID-19 (PFIZER) Purple Cap Monovalent 02/27/2021, 03/20/2021, 10/07/2021    DTaP 05/06/1977, 07/01/1977, 06/23/1978, 07/28/1992    Flu Vaccine Quad PF 6-35MO 11/05/2018    Fluzone Quad >6mos (Multi-dose) 11/05/2018    MMR 03/17/1978    OPV 07/01/1977, 06/23/1978, 05/06/2010    OPV UF 07/01/1977, 06/23/1978, 05/06/2010        Medications:     Current Outpatient Medications:     ALPRAZolam (XANAX) 0.5 MG tablet, Take 1 tablet by mouth 3 (Three) Times a Day As Needed., Disp: , Rfl:     ascorbic acid (VITAMIN C) 500 MG tablet, Take 4 tablets by mouth Daily., Disp: , Rfl:     diphenhydrAMINE (BENADRYL) 25 MG tablet, Take 1 tablet by mouth Every 6 (Six) Hours As Needed for Itching., Disp: , Rfl:     divalproex (DEPAKOTE ER) 500 MG 24 hr tablet, Take 2 tablets by mouth Daily., Disp: 180 tablet, Rfl: 3    escitalopram (LEXAPRO) 20 MG tablet, Take  by mouth., Disp: , Rfl:     famotidine (PEPCID) 10 MG tablet, Take 1 tablet by mouth 2 (Two) Times a Day As Needed for Heartburn., Disp: , Rfl:     FEVERFEW PO, Take 1 tablet by mouth 3 (Three) Times a Day With Meals., Disp: , Rfl:     levothyroxine (SYNTHROID, LEVOTHROID) 25 MCG tablet, Take 1 tablet by mouth Every Morning., Disp: 90 tablet, Rfl: 1    Lurasidone HCl (LATUDA) 20 MG tablet tablet, TAKE 1 TABLET BY MOUTH ONCE DAILY IN THE EVENING WITH FOOD, Disp: , Rfl:     melatonin 1 MG tablet, Take 0.5 tablets by mouth Every Night., Disp: , Rfl:     mupirocin (BACTROBAN) 2 % ointment, APPLY 2-3 TIMES DAILY FOR 7 DAYS, Disp: , Rfl:     SUMAtriptan (IMITREX) 100 MG tablet, Take one tablet at onset of headache. May repeat dose one time in 2 hours if headache not relieved., Disp: 36 tablet, Rfl: 3    Vitamin D-Vitamin K (K2-D3 5000 PO), Take 2 tablets by mouth Daily., Disp: , Rfl:     5-Hydroxytryptophan (5-HTP PO), Take  by mouth. 2 tablets after lunch and 1 after dinner  "(Patient not taking: Reported on 6/3/2024), Disp: , Rfl:     Naltrexone HCl, Pain, 1.5 MG capsule, Take 1.5 mg by mouth Daily., Disp: , Rfl:     Allergies:   Allergies   Allergen Reactions    Tomato Unknown - Low Severity    Amoxicillin-Pot Clavulanate GI Intolerance    Dexamethasone Mental Status Change     Manic episodes    Gluten Meal Rash    Sulfa Antibiotics Hives       Family History:   Family History   Problem Relation Age of Onset    Depression Mother     Vision loss Mother     Anxiety disorder Father     Depression Father     Hearing loss Father     Hyperlipidemia Father     Stroke Father     Breast cancer Paternal Grandmother     Cancer Paternal Grandmother         post-menopausal    Narcolepsy Paternal Grandmother     Breast cancer Maternal Aunt     Cancer Maternal Aunt         post-menopausal    Breast cancer Paternal Aunt     Cancer Paternal Aunt         post-menopausal    Mental illness Paternal Aunt         Bipolar    Mental illness Paternal Aunt         Cyclothemia    Developmental Disability Paternal Uncle     Narcolepsy Paternal Cousin        Social History:   Social History     Socioeconomic History    Marital status:    Tobacco Use    Smoking status: Never     Passive exposure: Never    Smokeless tobacco: Never   Vaping Use    Vaping status: Never Used   Substance and Sexual Activity    Alcohol use: No    Drug use: Never    Sexual activity: Yes     Partners: Male     Comment: TTC         Objective     Vital Signs  /62 (BP Location: Right arm, Patient Position: Sitting, Cuff Size: Adult)   Pulse 62   Temp 97.1 °F (36.2 °C) (Infrared)   Resp 14   Wt 94.7 kg (208 lb 12.8 oz)   SpO2 96%   BMI 31.75 kg/m²   Estimated body mass index is 31.75 kg/m² as calculated from the following:    Height as of 7/3/24: 172.7 cm (68\").    Weight as of this encounter: 94.7 kg (208 lb 12.8 oz).            Physical Exam  Vitals and nursing note reviewed.   Constitutional:       Appearance: Normal " appearance.   Cardiovascular:      Rate and Rhythm: Normal rate and regular rhythm.      Pulses: Normal pulses.      Heart sounds: Normal heart sounds.   Pulmonary:      Effort: Pulmonary effort is normal.      Breath sounds: Normal breath sounds.   Skin:     General: Skin is warm and dry.   Neurological:      General: No focal deficit present.      Mental Status: She is alert.   Psychiatric:         Mood and Affect: Mood normal.         Behavior: Behavior normal.          Assessment and Plan     1. Mast cell activation syndrome      2. Dyspnea, unspecified type    - Complete PFT - Pre & Post Bronchodilator; Future       Reviewed medications, potential side effects and signs and symptoms to report. Discussed risk versus benefits of treatment plan with patient and/or family-including medications, labs and radiology that may be ordered. Addressed questions and concerns during visit. Patient and/or family verbalized understanding and agree with plan. Instructed to call the office with any questions and report to ER with any life-threatening symptoms.       Follow Up  No follow-ups on file.    KRYSTEN [facility]  [address] [address] [phone]

## 2024-08-13 ENCOUNTER — OFFICE VISIT (OUTPATIENT)
Dept: INTERNAL MEDICINE | Facility: CLINIC | Age: 49
End: 2024-08-13
Payer: COMMERCIAL

## 2024-08-13 VITALS
HEART RATE: 80 BPM | RESPIRATION RATE: 14 BRPM | BODY MASS INDEX: 31.37 KG/M2 | DIASTOLIC BLOOD PRESSURE: 70 MMHG | TEMPERATURE: 97.7 F | WEIGHT: 207 LBS | HEIGHT: 68 IN | SYSTOLIC BLOOD PRESSURE: 122 MMHG

## 2024-08-13 DIAGNOSIS — R09.81 NASAL CONGESTION: Primary | ICD-10-CM

## 2024-08-13 PROCEDURE — 99213 OFFICE O/P EST LOW 20 MIN: CPT | Performed by: PHYSICIAN ASSISTANT

## 2024-08-13 RX ORDER — CHLORCYCLIZINE HYDROCHLORIDE AND PSEUDOEPHEDRINE HYDROCHLORIDE 25; 60 MG/1; MG/1
1 TABLET ORAL NIGHTLY PRN
Qty: 30 TABLET | Refills: 3 | Status: SHIPPED | OUTPATIENT
Start: 2024-08-13

## 2024-08-13 NOTE — PROGRESS NOTES
"    Office Note     Name: Lucille Mejía    : 1975     MRN: [id number]     Chief Complaint  Earache (Right ear pain, started a few days ago.) and Nasal Congestion    Subjective     History of Present Illness:  Lucille Mejaí is a 48 y.o. female who presents today for right ear pain that is since resolved.  Patient also reports nasal congestion that is preventing her from using her CPAP.  She reports in the past Dr. Hernandez Prescribed her Stahist which did resolve the symptoms when they occur.  Patient does need the medication daily, but is out of the medication for as needed at this time.  As any additional symptoms or concerns at this time.  Review of Systems:   Review of Systems    Past Medical History:   Past Medical History:   Diagnosis Date    Ankle sprain     Left ankle.  Fell down a flight of stairs, bad sprain.    Asthma     Bipolar 1 disorder     Cervical disc disorder     Depression     Fracture of ankle     Left fibula    Fracture, fibula 2020    Headache     Hyperlipidemia     Knee sprain     Left knee    Low back strain     Pain in sciatic nerve    Neuroma of foot     Right foot, four cryosurgeries.    Personal history of other medical treatment 2020    pt states she has \"histamine intolerance\"    Scoliosis     Possible    Stress fracture     Of left fibula    Tennis elbow     From playing piano, severe injury       Past Surgical History:   Past Surgical History:   Procedure Laterality Date    FOOT SURGERY Right     Love's neuroma    LIPOMA EXCISION         Immunizations:   Immunization History   Administered Date(s) Administered    COVID-19 (PFIZER) Purple Cap Monovalent 2021, 2021, 10/07/2021    DTaP 1977, 1977, 1978, 1992    Flu Vaccine Quad PF 6-35MO 2018    Fluzone Quad >6mos (Multi-dose) 2018    MMR 1978    OPV 1977, 1978, 2010    OPV UF " 07/01/1977, 06/23/1978, 05/06/2010        Medications:     Current Outpatient Medications:     ALPRAZolam (XANAX) 0.5 MG tablet, Take 1 tablet by mouth 3 (Three) Times a Day As Needed., Disp: , Rfl:     ascorbic acid (VITAMIN C) 1000 MG tablet, Take 1 tablet by mouth Daily., Disp: , Rfl:     diphenhydrAMINE (BENADRYL) 25 MG tablet, Take 1 tablet by mouth Every 6 (Six) Hours As Needed for Itching., Disp: , Rfl:     divalproex (DEPAKOTE ER) 500 MG 24 hr tablet, Take 2 tablets by mouth Daily., Disp: 180 tablet, Rfl: 3    escitalopram (LEXAPRO) 20 MG tablet, Take  by mouth., Disp: , Rfl:     famotidine (PEPCID) 10 MG tablet, Take 1 tablet by mouth 2 (Two) Times a Day As Needed for Heartburn., Disp: , Rfl:     FEVERFEW PO, Take 1 tablet by mouth 3 (Three) Times a Day With Meals., Disp: , Rfl:     levothyroxine (SYNTHROID, LEVOTHROID) 25 MCG tablet, Take 1 tablet by mouth Every Morning., Disp: 90 tablet, Rfl: 1    Lurasidone HCl (LATUDA) 20 MG tablet tablet, TAKE 1 TABLET BY MOUTH ONCE DAILY IN THE EVENING WITH FOOD, Disp: , Rfl:     melatonin 1 MG tablet, Take 0.5 tablets by mouth Every Night., Disp: , Rfl:     Naltrexone HCl, Pain, 1.5 MG capsule, Take 1.5 mg by mouth Daily., Disp: , Rfl:     SUMAtriptan (IMITREX) 100 MG tablet, Take one tablet at onset of headache. May repeat dose one time in 2 hours if headache not relieved., Disp: 36 tablet, Rfl: 3    Vitamin D-Vitamin K (K2-D3 5000 PO), Take 2 tablets by mouth Daily., Disp: , Rfl:     5-Hydroxytryptophan (5-HTP PO), Take  by mouth. 2 tablets after lunch and 1 after dinner (Patient not taking: Reported on 6/3/2024), Disp: , Rfl:     Chlorcyclizine-Pseudoephed (Stahist AD) 25-60 MG tablet, Take 1 tablet by mouth At Night As Needed (nasal congestion)., Disp: 30 tablet, Rfl: 3    mupirocin (BACTROBAN) 2 % ointment, APPLY 2-3 TIMES DAILY FOR 7 DAYS (Patient not taking: Reported on 8/13/2024), Disp: , Rfl:     Allergies:   Allergies   Allergen Reactions    Tomato Unknown  "- Low Severity    Amoxicillin-Pot Clavulanate GI Intolerance    Dexamethasone Mental Status Change     Manic episodes    Gluten Meal Rash    Histamine Phosphate Diarrhea, Itching and Other (See Comments)     Allergy type reactions, reflux.    Sulfa Antibiotics Hives       Family History:   Family History   Problem Relation Age of Onset    Depression Mother     Vision loss Mother     Anxiety disorder Father     Depression Father     Hearing loss Father     Hyperlipidemia Father     Stroke Father     Breast cancer Paternal Grandmother     Cancer Paternal Grandmother         post-menopausal    Narcolepsy Paternal Grandmother     Breast cancer Maternal Aunt     Cancer Maternal Aunt         post-menopausal    Breast cancer Paternal Aunt     Cancer Paternal Aunt         post-menopausal    Mental illness Paternal Aunt         Bipolar    Mental illness Paternal Aunt         Cyclothemia    Developmental Disability Paternal Uncle     Narcolepsy Paternal Cousin        Social History:   Social History     Socioeconomic History    Marital status:    Tobacco Use    Smoking status: Never     Passive exposure: Never    Smokeless tobacco: Never   Vaping Use    Vaping status: Never Used   Substance and Sexual Activity    Alcohol use: No    Drug use: Never    Sexual activity: Yes     Partners: Male     Comment: TTC         Objective     Vital Signs  /70   Pulse 80   Temp 97.7 °F (36.5 °C) (Temporal)   Resp 14   Ht 172.7 cm (68\")   Wt 93.9 kg (207 lb)   BMI 31.47 kg/m²   Estimated body mass index is 31.47 kg/m² as calculated from the following:    Height as of this encounter: 172.7 cm (68\").    Weight as of this encounter: 93.9 kg (207 lb).            Physical Exam  Vitals and nursing note reviewed.   Constitutional:       Appearance: Normal appearance.   HENT:      Head: Normocephalic and atraumatic.      Right Ear: Tympanic membrane normal.      Left Ear: Tympanic membrane normal.      Nose: Congestion present. No " rhinorrhea.      Mouth/Throat:      Mouth: Mucous membranes are moist.      Pharynx: Oropharynx is clear.   Eyes:      Extraocular Movements: Extraocular movements intact.      Conjunctiva/sclera: Conjunctivae normal.      Pupils: Pupils are equal, round, and reactive to light.   Cardiovascular:      Rate and Rhythm: Normal rate and regular rhythm.   Pulmonary:      Effort: Pulmonary effort is normal.      Breath sounds: Normal breath sounds.   Skin:     General: Skin is warm and dry.   Neurological:      Mental Status: She is alert.   Psychiatric:         Mood and Affect: Mood normal.         Behavior: Behavior normal.          Assessment and Plan     1. Nasal congestion    - Chlorcyclizine-Pseudoephed (Stahist AD) 25-60 MG tablet; Take 1 tablet by mouth At Night As Needed (nasal congestion).  Dispense: 30 tablet; Refill: 3     Reviewed medications, potential side effects and signs and symptoms to report. Discussed risk versus benefits of treatment plan with patient and/or family-including medications, labs and radiology that may be ordered. Addressed questions and concerns during visit. Patient and/or family verbalized understanding and agree with plan. Instructed to call the office with any questions and report to ER with any life-threatening symptoms.       Follow Up  No follow-ups on file.    [facility]  [address] [address] [phone]

## 2024-08-19 ENCOUNTER — HOSPITAL ENCOUNTER (OUTPATIENT)
Dept: PULMONOLOGY | Facility: HOSPITAL | Age: 49
Discharge: HOME | End: 2024-08-19
Admitting: PHYSICIAN ASSISTANT
Payer: COMMERCIAL

## 2024-08-19 DIAGNOSIS — R06.00 DYSPNEA, UNSPECIFIED TYPE: ICD-10-CM

## 2024-08-19 PROCEDURE — 94727 GAS DIL/WSHOT DETER LNG VOL: CPT

## 2024-08-19 PROCEDURE — 94010 BREATHING CAPACITY TEST: CPT

## 2024-08-19 PROCEDURE — 94729 DIFFUSING CAPACITY: CPT

## 2024-08-28 ENCOUNTER — TRANSCRIBE ORDERS (OUTPATIENT)
Dept: ADMINISTRATIVE | Facility: HOSPITAL | Age: 49
End: 2024-08-28
Payer: COMMERCIAL

## 2024-08-28 DIAGNOSIS — R92.8 ABNORMAL MAMMOGRAM: Primary | ICD-10-CM

## 2024-08-30 ENCOUNTER — OFFICE VISIT (OUTPATIENT)
Dept: INTERNAL MEDICINE | Facility: CLINIC | Age: 49
End: 2024-08-30
Payer: COMMERCIAL

## 2024-08-30 VITALS
BODY MASS INDEX: 31.52 KG/M2 | HEIGHT: 68 IN | HEART RATE: 82 BPM | TEMPERATURE: 97.8 F | SYSTOLIC BLOOD PRESSURE: 122 MMHG | WEIGHT: 208 LBS | RESPIRATION RATE: 20 BRPM | DIASTOLIC BLOOD PRESSURE: 78 MMHG | OXYGEN SATURATION: 97 %

## 2024-08-30 DIAGNOSIS — Z13.6 SCREENING FOR ISCHEMIC HEART DISEASE: ICD-10-CM

## 2024-08-30 DIAGNOSIS — G47.33 MODERATE OBSTRUCTIVE SLEEP APNEA: ICD-10-CM

## 2024-08-30 DIAGNOSIS — E55.9 VITAMIN D DEFICIENCY: ICD-10-CM

## 2024-08-30 DIAGNOSIS — F31.9 BIPOLAR 1 DISORDER: ICD-10-CM

## 2024-08-30 DIAGNOSIS — D89.40 MAST CELL ACTIVATION SYNDROME: ICD-10-CM

## 2024-08-30 DIAGNOSIS — E78.49 OTHER HYPERLIPIDEMIA: ICD-10-CM

## 2024-08-30 DIAGNOSIS — Z00.00 ROUTINE HEALTH MAINTENANCE: Primary | ICD-10-CM

## 2024-08-30 DIAGNOSIS — E66.09 CLASS 1 OBESITY DUE TO EXCESS CALORIES WITHOUT SERIOUS COMORBIDITY WITH BODY MASS INDEX (BMI) OF 31.0 TO 31.9 IN ADULT: ICD-10-CM

## 2024-08-30 LAB — HBA1C MFR BLD: 5.2 % (ref 4.8–5.6)

## 2024-08-30 PROCEDURE — 80050 GENERAL HEALTH PANEL: CPT | Performed by: STUDENT IN AN ORGANIZED HEALTH CARE EDUCATION/TRAINING PROGRAM

## 2024-08-30 PROCEDURE — 83036 HEMOGLOBIN GLYCOSYLATED A1C: CPT | Performed by: STUDENT IN AN ORGANIZED HEALTH CARE EDUCATION/TRAINING PROGRAM

## 2024-08-30 PROCEDURE — 80061 LIPID PANEL: CPT | Performed by: STUDENT IN AN ORGANIZED HEALTH CARE EDUCATION/TRAINING PROGRAM

## 2024-08-30 NOTE — PROGRESS NOTES
Female Physical Note      Date: 2024   Patient Name: Lucille Mejía  : 1975   MRN: [id number]     Chief Complaint:    Chief Complaint   Patient presents with    Annual Exam       History of Present Illness: Lucille Mejía is a 48 y.o. female with history of treatment resistant depression, bipolar 1, chronic fatigue, subclinical hypothyroidism who is here today for their annual health maintenance and physical.    HPI  History of Present Illness  The patient presents for an annual physical exam.    She continues to experience persistent fatigue, despite using a CPAP machine which has successfully alleviated her headaches.  Her sleep duration varies, with occasional naps during the day. She finds walking challenging due to early darkness and experiences extreme fatigue after overexertion. She struggles with climbing stairs and plans to move to a single-story house in [place] in 2025.    She has been consulting with Dr. Early at [facility]'s Functional Medicine Department, who has recommended dietary changes and exercise. She is currently on a daily dose of 3 mg naltrexone, which she believes has improved her breathing. She performs a daily sinus rinse with silver, which she finds beneficial. She is scheduled for a test for mold.    She is under the care of Dr. Ledesma for psychiatry, Dr. Singh for neurology, and Kris Escamilla, a dietitian at [facility]. She also consults Dr. Lyles for surgical follow-ups. She occasionally experiences soreness at the surgical site.    She reports an improvement in her mood, although she had a brief period of low mood a few days ago. She does not endorse any active suicidal ideation but admits to having such thoughts when feeling low. She plays piano which helps her mood.    She had a Pap smear in 2023.        -Betsy Thomason: [facility] Garner for Mast cell activation center (prev saw functional [facility])  -  "Psychiatry: Dr. Ledesma  - Neurology: Dr. Singh  - [facility]) - Mod to severe JAVI on CPAP  - Dietitian: Kris Escamilla RD ([facility])  - Shalini Prater DO ([facility]) Functional Medicine      Mast Cell activation Syndrome  - I personally reviewed note dated 6/25/24 by Dr. Shalini Prater of Functional Medicine. At that time recommended resuming low dose naltrexone (max dose 3mg). Stated \"Once stable on LDN, option to add Quercetin and Audi one cap twice daily \". F/u 3 months    - Headaches  I personally reivewed note by Dr. Jesus singh of neurology on 6/3/24. Continue current medications.     I personally reviewed the following labs dated 6/25/24  - Complement 4A, normal  - OmegaCheck: low 4.4 (unclear clinical significance to me)  - Ferritin 179, wnl  - Transferrin 261, wnl  - Iron profile wnl  - High sensitivity CRP 1.1, wnl  - Homocysteine: 5.4, wnl  - mg RBC 5.3%  - copper 87 wnl  - Zinc 64 wnl  - vit d wnl  - b12 >2000  - lyme ab: negative    Subjective      Review of Systems:   Review of Systems    Past Medical History, Social History, Family History and Care Team were all reviewed with patient and updated as appropriate.     Medications:     Current Outpatient Medications:     5-Hydroxytryptophan (5-HTP PO), Take  by mouth. 2 tablets after lunch and 1 after dinner, Disp: , Rfl:     ALPRAZolam (XANAX) 0.5 MG tablet, Take 1 tablet by mouth 3 (Three) Times a Day As Needed., Disp: , Rfl:     ascorbic acid (VITAMIN C) 1000 MG tablet, Take 1 tablet by mouth Daily., Disp: , Rfl:     Chlorcyclizine-Pseudoephed (Stahist AD) 25-60 MG tablet, Take 1 tablet by mouth At Night As Needed (nasal congestion)., Disp: 30 tablet, Rfl: 3    diphenhydrAMINE (BENADRYL) 25 MG tablet, Take 1 tablet by mouth Every 6 (Six) Hours As Needed for Itching., Disp: , Rfl:     divalproex (DEPAKOTE ER) 500 MG 24 hr tablet, Take 2 tablets by mouth Daily., Disp: 180 tablet, Rfl: 3    " escitalopram (LEXAPRO) 20 MG tablet, Take  by mouth., Disp: , Rfl:     famotidine (PEPCID) 10 MG tablet, Take 1 tablet by mouth 2 (Two) Times a Day As Needed for Heartburn., Disp: , Rfl:     FEVERFEW PO, Take 1 tablet by mouth 3 (Three) Times a Day With Meals., Disp: , Rfl:     levothyroxine (SYNTHROID, LEVOTHROID) 25 MCG tablet, Take 1 tablet by mouth Every Morning., Disp: 90 tablet, Rfl: 1    Lurasidone HCl (LATUDA) 20 MG tablet tablet, TAKE 1 TABLET BY MOUTH ONCE DAILY IN THE EVENING WITH FOOD, Disp: , Rfl:     melatonin 1 MG tablet, Take 0.5 tablets by mouth Every Night., Disp: , Rfl:     mupirocin (BACTROBAN) 2 % ointment, , Disp: , Rfl:     Naltrexone HCl, Pain, 1.5 MG capsule, Take 3 mg by mouth Daily., Disp: , Rfl:     SUMAtriptan (IMITREX) 100 MG tablet, Take one tablet at onset of headache. May repeat dose one time in 2 hours if headache not relieved., Disp: 36 tablet, Rfl: 3    Vitamin D-Vitamin K (K2-D3 5000 PO), Take 2 tablets by mouth Daily., Disp: , Rfl:     Allergies:   Allergies   Allergen Reactions    Tomato Unknown - Low Severity    Amoxicillin-Pot Clavulanate GI Intolerance    Dexamethasone Mental Status Change     Manic episodes    Gluten Meal Rash    Histamine Phosphate Diarrhea, Itching and Other (See Comments)     Allergy type reactions, reflux.    Sulfa Antibiotics Hives       Immunizations:  Td/Tdap(Booster Q 10 yrs):  due recommend  Flu (Yearly):  recommend annually in fall  Pneumonia: NA  Immunization History   Administered Date(s) Administered    COVID-19 (PFIZER) Purple Cap Monovalent 02/27/2021, 03/20/2021, 10/07/2021    DTaP 05/06/1977, 07/01/1977, 06/23/1978, 07/28/1992    Flu Vaccine Quad PF 6-35MO 11/05/2018    Fluzone Quad >6mos (Multi-dose) 11/05/2018    MMR 03/17/1978    OPV 07/01/1977, 06/23/1978, 05/06/2010    OPV UF 07/01/1977, 06/23/1978, 05/06/2010       Colorectal Screening:   UTD   Last Completed Colonoscopy            COLORECTAL CANCER SCREENING (COLOGUARD - Every 3  Years) Next due on 3/18/2026      03/18/2023  Cologuard component of Cologuard - Stool, Per Rectum                  Pap:   reports UTD ([facility]) 11/2023  Last Completed Pap Smear       This patient has no relevant Health Maintenance data.           Mammogram:   due for repeat, upcoming appt 9/6/24  Last Completed Mammogram            Scheduled - MAMMOGRAM (Every 2 Years) Scheduled for 9/6/2024 12/18/2023  Mammo Diagnostic Digital Tomosynthesis Bilateral With CAD    11/13/2023  Mammo Screening Digital Tomosynthesis Bilateral With CAD                       Hep C (Age 18-79 once):  NR in past    A1c: due  Lipid panel: due  The 10-year ASCVD risk score (Chata MIRELES, et al., 2019) is: 1.9%    Values used to calculate the score:      Age: 48 years      Sex: Female      Is Non- : No      Diabetic: No      Tobacco smoker: No      Systolic Blood Pressure: 122 mmHg      Is BP treated: No      HDL Cholesterol: 33 mg/dL      Total Cholesterol: 205 mg/dL      Ophthalmologist: regularly  Dentist: regularly    Tobacco Use: Low Risk  (8/30/2024)    Patient History     Smoking Tobacco Use: Never     Smokeless Tobacco Use: Never     Passive Exposure: Never       Social History     Substance and Sexual Activity   Alcohol Use No        Social History     Substance and Sexual Activity   Drug Use Never        Diet/Physical activity:   Minimal physical activity, but trying to walk more.    Sexual Health: monogamous with     PHQ-2 Depression Screening  Little interest or pleasure in doing things? 1-->several days   Feeling down, depressed, or hopeless? 1-->several days   PHQ-2 Total Score 11     PHQ-9 Total Score: 11     PHQ-9 Depression Screening  Little interest or pleasure in doing things? 1-->several days   Feeling down, depressed, or hopeless? 1-->several days   Trouble falling or staying asleep, or sleeping too much? 3-->nearly every day   Feeling tired or having little energy?  "3-->nearly every day   Poor appetite or overeating? 0-->not at all   Feeling bad about yourself - or that you are a failure or have let yourself or your family down? 1-->several days   Trouble concentrating on things, such as reading the newspaper or watching television? 0-->not at all   Moving or speaking so slowly that other people could have noticed? Or the opposite - being so fidgety or restless that you have been moving around a lot more than usual? 1-->several days (slowly)   Thoughts that you would be better off dead, or of hurting yourself in some way? 1-->several days   PHQ-9 Total Score 11   If you checked off any problems, how difficult have these problems made it for you to do your work, take care of things at home, or get along with other people? somewhat difficult         Objective     Physical Exam:  Vital Signs:   Vitals:    08/30/24 1424   BP: 122/78   BP Location: Left arm   Patient Position: Sitting   Cuff Size: Adult   Pulse: 82   Resp: 20   Temp: 97.8 °F (36.6 °C)   TempSrc: Temporal   SpO2: 97%   Weight: 94.3 kg (208 lb)   Height: 173 cm (68.11\")   PainSc: 0-No pain     Body mass index is 31.52 kg/m².     Physical Exam  Vitals reviewed.   Constitutional:       General: She is not in acute distress.     Appearance: Normal appearance. She is obese. She is not ill-appearing or toxic-appearing.      Comments:      HENT:      Head: Normocephalic and atraumatic.      Right Ear: External ear normal.      Left Ear: External ear normal.      Nose: Nose normal.      Mouth/Throat:      Mouth: Mucous membranes are moist.   Eyes:      General: No scleral icterus.     Extraocular Movements: Extraocular movements intact.   Cardiovascular:      Rate and Rhythm: Normal rate and regular rhythm.      Pulses: Normal pulses.      Heart sounds: No murmur heard.     No friction rub. No gallop.   Pulmonary:      Effort: Pulmonary effort is normal. No respiratory distress.      Breath sounds: Normal breath sounds. No " "stridor. No wheezing or rales.   Abdominal:      General: Abdomen is flat. Bowel sounds are normal. There is no distension.      Palpations: Abdomen is soft. There is no mass.      Tenderness: There is no abdominal tenderness. There is no guarding or rebound.   Musculoskeletal:         General: No swelling or tenderness. Normal range of motion.      Cervical back: Normal range of motion and neck supple. No rigidity or tenderness.   Lymphadenopathy:      Cervical: No cervical adenopathy.   Skin:     General: Skin is warm and dry.      Capillary Refill: Capillary refill takes less than 2 seconds.      Findings: No erythema or rash.   Neurological:      General: No focal deficit present.      Mental Status: She is alert and oriented to person, place, and time.      Cranial Nerves: No cranial nerve deficit.      Sensory: No sensory deficit.      Motor: No weakness.      Gait: Gait normal.   Psychiatric:         Mood and Affect: Mood normal.         Behavior: Behavior normal.         Judgment: Judgment normal.      Comments: Flat affect. Denies active si or HI       Physical Exam  Normal lung function.  Normal heart function.      Procedures    Results  Laboratory Studies  High sensitivity CRP was normal. Homocysteine was normal. Comprehensin levels were normal. Vitamin D level and B12 both normal.      Assessment / Plan      Assessment/Plan:   Problem List Items Addressed This Visit       Bipolar 1 disorder    Overview     Dr. Ledesma: psych         Current Assessment & Plan     Chronic improving. PHQ9 elevated, but denies any active SI or HI and reports she is doing \"pretty good\". Counseled on seeking ER care, calling 911 etc if any intrusive thoughts. Continue current medications and f/u with psychiatry.          Mast cell activation syndrome    Current Assessment & Plan     Chronic, improving. Following with [facility]. Continue naltrexone as ordered. Continue follow up with functional medicine.         " Vitamin D deficiency    Current Assessment & Plan     Chronic, improved. Continue OTC supplement 1000 IU daily         Other hyperlipidemia    Current Assessment & Plan     Chronic, stable. Will repeat fasting lipid today. Counseled on healthy diet and exercise         Moderate obstructive sleep apnea     Other Visit Diagnoses       Routine health maintenance    -  Primary    Relevant Orders    CBC Auto Differential    Comprehensive Metabolic Panel    Screening for ischemic heart disease        Relevant Orders    Lipid Panel    Class 1 obesity due to excess calories without serious comorbidity with body mass index (BMI) of 31.0 to 31.9 in adult        Relevant Orders    TSH Rfx On Abnormal To Free T4    Hemoglobin A1c          Assessment & Plan  1. Chronic Fatigue Syndrome.  She reports persistent fatigue despite using a CPAP machine, which has helped with her headaches but not with her overall tiredness. She is currently on Naltrexone 3 mg daily. A referral will be made to a specialist for chronic fatigue syndrome after consulting with a colleague who has extensive experience in the area. Blood work, including thyroid function tests, will be ordered to rule out other potential causes of fatigue. She is advised to continue her current medication regimen and to try to separate her thyroid medication from other medications to ensure proper absorption.    2. Sleep Apnea.  Chronic, improving  She has been using a CPAP machine, which has improved her headaches. She should continue using the CPAP machine as prescribed.    3. Health Maintenance.  She is up to date with her colon cancer screening, which is not due until 2026. Her Pap smear was last done in November and is current. A follow-up for her mammogram is scheduled for September 6. She is advised to continue regular visits to the eye doctor and dentist.    Follow-up  The patient will follow up in 6 months.      Healthcare Maintenance:  Counseling provided based on  age appropriate USPSTF guidelines.  BMI is >= 30 and <35. (Class 1 Obesity). The following options were offered after discussion;: exercise counseling/recommendations and nutrition counseling/recommendations        [place] Mejía voices understanding and acceptance of this advice and will call back with any further questions or concerns. AVS with preventive healthcare tips printed for patient.     “Discussed risks/benefits to vaccination, reviewed components of the vaccine, discussed VIS, discussed informed consent, informed consent obtained. Patient/Parent was allowed to accept or refuse vaccine. Questions answered to satisfactory state of patient/Parent. We reviewed typical age appropriate and seasonally appropriate vaccinations. Reviewed immunization history and updated state vaccination form as needed. Patient was counseled on Influenza  Tdap    Follow Up:   Return in about 6 months (around 2/28/2025) for Recheck fatigue, thyroid.        Irineo Hernandez MD   [facility] Fatima    Patient or patient representative verbalized consent for the use of Ambient Listening during the visit with  Irineo Hernandez MD for chart documentation. 8/30/2024  15:00 EDT

## 2024-08-30 NOTE — PATIENT INSTRUCTIONS
Health Maintenance, Female  Adopting a healthy lifestyle and getting preventive care can go a long way to promote health and wellness. Talk with your health care provider about what schedule of regular examinations is right for you. This is a good chance for you to check in with your provider about disease prevention and staying healthy.  In between checkups, there are plenty of things you can do on your own. Experts have done a lot of research about which lifestyle changes and preventive measures are most likely to keep you healthy. Ask your health care provider for more information.  Weight and diet  Eat a healthy diet  Be sure to include plenty of vegetables, fruits, low-fat dairy products, and lean protein.  Do not eat a lot of foods high in solid fats, added sugars, or salt.  Get regular exercise. This is one of the most important things you can do for your health.  Most adults should exercise for at least 150 minutes each week. The exercise should increase your heart rate and make you sweat (moderate-intensity exercise).  Most adults should also do strengthening exercises at least twice a week. This is in addition to the moderate-intensity exercise.     Maintain a healthy weight  Body mass index (BMI) is a measurement that can be used to identify possible weight problems. It estimates body fat based on height and weight. Your health care provider can help determine your BMI and help you achieve or maintain a healthy weight.  For females 20 years of age and older:  A BMI below 18.5 is considered underweight.  A BMI of 18.5 to 24.9 is normal.  A BMI of 25 to 29.9 is considered overweight.  A BMI of 30 and above is considered obese.     Watch levels of cholesterol and blood lipids  You should start having your blood tested for lipids and cholesterol at 20 years of age, then have this test every 5 years.  You may need to have your cholesterol levels checked more often if:  Your lipid or cholesterol levels are  high.  You are older than 50 years of age.  You are at high risk for heart disease.     Cancer screening  Lung Cancer  Lung cancer screening is recommended for adults 55-80 years old who are at high risk for lung cancer because of a history of smoking.  A yearly low-dose CT scan of the lungs is recommended for people who:  Currently smoke.  Have quit within the past 15 years.  Have at least a 30-pack-year history of smoking. A pack year is smoking an average of one pack of cigarettes a day for 1 year.  Yearly screening should continue until it has been 15 years since you quit.  Yearly screening should stop if you develop a health problem that would prevent you from having lung cancer treatment.     Breast Cancer  Practice breast self-awareness. This means understanding how your breasts normally appear and feel.  It also means doing regular breast self-exams. Let your health care provider know about any changes, no matter how small.  If you are in your 20s or 30s, you should have a clinical breast exam (CBE) by a health care provider every 1-3 years as part of a regular health exam.  If you are 40 or older, have a CBE every year. Also consider having a breast X-ray (mammogram) every year.  If you have a family history of breast cancer, talk to your health care provider about genetic screening.  If you are at high risk for breast cancer, talk to your health care provider about having an MRI and a mammogram every year.  Breast cancer gene (BRCA) assessment is recommended for women who have family members with BRCA-related cancers. BRCA-related cancers include:  Breast.  Ovarian.  Tubal.  Peritoneal cancers.  Results of the assessment will determine the need for genetic counseling and BRCA1 and BRCA2 testing.     Cervical Cancer  Your health care provider may recommend that you be screened regularly for cancer of the pelvic organs (ovaries, uterus, and vagina). This screening involves a pelvic examination, including  checking for microscopic changes to the surface of your cervix (Pap test). You may be encouraged to have this screening done every 3 years, beginning at age 21.  For women ages 30-65, health care providers may recommend pelvic exams and Pap testing every 3 years, or they may recommend the Pap and pelvic exam, combined with testing for human papilloma virus (HPV), every 5 years. Some types of HPV increase your risk of cervical cancer. Testing for HPV may also be done on women of any age with unclear Pap test results.  Other health care providers may not recommend any screening for nonpregnant women who are considered low risk for pelvic cancer and who do not have symptoms. Ask your health care provider if a screening pelvic exam is right for you.  If you have had past treatment for cervical cancer or a condition that could lead to cancer, you need Pap tests and screening for cancer for at least 20 years after your treatment. If Pap tests have been discontinued, your risk factors (such as having a new sexual partner) need to be reassessed to determine if screening should resume. Some women have medical problems that increase the chance of getting cervical cancer. In these cases, your health care provider may recommend more frequent screening and Pap tests.     Colorectal Cancer  This type of cancer can be detected and often prevented.  Routine colorectal cancer screening usually begins at 50 years of age and continues through 75 years of age.  Your health care provider may recommend screening at an earlier age if you have risk factors for colon cancer.  Your health care provider may also recommend using home test kits to check for hidden blood in the stool.  A small camera at the end of a tube can be used to examine your colon directly (sigmoidoscopy or colonoscopy). This is done to check for the earliest forms of colorectal cancer.  Routine screening usually begins at age 50.  Direct examination of the colon should  be repeated every 5-10 years through 75 years of age. However, you may need to be screened more often if early forms of precancerous polyps or small growths are found.     Skin Cancer  Check your skin from head to toe regularly.  Tell your health care provider about any new moles or changes in moles, especially if there is a change in a mole's shape or color.  Also tell your health care provider if you have a mole that is larger than the size of a pencil eraser.  Always use sunscreen. Apply sunscreen liberally and repeatedly throughout the day.  Protect yourself by wearing long sleeves, pants, a wide-brimmed hat, and sunglasses whenever you are outside.     Heart disease, diabetes, and high blood pressure  High blood pressure causes heart disease and increases the risk of stroke. High blood pressure is more likely to develop in:  People who have blood pressure in the high end of the normal range (130-139/85-89 mm Hg).  People who are overweight or obese.  People who are .  If you are 18-39 years of age, have your blood pressure checked every 3-5 years. If you are 40 years of age or older, have your blood pressure checked every year. You should have your blood pressure measured twice--once when you are at a hospital or clinic, and once when you are not at a hospital or clinic. Record the average of the two measurements. To check your blood pressure when you are not at a hospital or clinic, you can use:  An automated blood pressure machine at a pharmacy.  A home blood pressure monitor.  If you are between 55 years and 79 years old, ask your health care provider if you should take aspirin to prevent strokes.  Have regular diabetes screenings. This involves taking a blood sample to check your fasting blood sugar level.  If you are at a normal weight and have a low risk for diabetes, have this test once every three years after 45 years of age.  If you are overweight and have a high risk for diabetes,  consider being tested at a younger age or more often.  Preventing infection  Hepatitis B  If you have a higher risk for hepatitis B, you should be screened for this virus. You are considered at high risk for hepatitis B if:  You were born in a country where hepatitis B is common. Ask your health care provider which countries are considered high risk.  Your parents were born in a high-risk country, and you have not been immunized against hepatitis B (hepatitis B vaccine).  You have HIV or AIDS.  You use needles to inject street drugs.  You live with someone who has hepatitis B.  You have had sex with someone who has hepatitis B.  You get hemodialysis treatment.  You take certain medicines for conditions, including cancer, organ transplantation, and autoimmune conditions.     Hepatitis C  Blood testing is recommended for:  Everyone born from 1945 through 1965.  Anyone with known risk factors for hepatitis C.     Sexually transmitted infections (STIs)  You should be screened for sexually transmitted infections (STIs) including gonorrhea and chlamydia if:  You are sexually active and are younger than 24 years of age.  You are older than 24 years of age and your health care provider tells you that you are at risk for this type of infection.  Your sexual activity has changed since you were last screened and you are at an increased risk for chlamydia or gonorrhea. Ask your health care provider if you are at risk.  If you do not have HIV, but are at risk, it may be recommended that you take a prescription medicine daily to prevent HIV infection. This is called pre-exposure prophylaxis (PrEP). You are considered at risk if:  You are sexually active and do not regularly use condoms or know the HIV status of your partner(s).  You take drugs by injection.  You are sexually active with a partner who has HIV.     Talk with your health care provider about whether you are at high risk of being infected with HIV. If you choose to  begin PrEP, you should first be tested for HIV. You should then be tested every 3 months for as long as you are taking PrEP.  Pregnancy  If you are premenopausal and you may become pregnant, ask your health care provider about preconception counseling.  If you may become pregnant, take 400 to 800 micrograms (mcg) of folic acid every day.  If you want to prevent pregnancy, talk to your health care provider about birth control (contraception).  Osteoporosis and menopause  Osteoporosis is a disease in which the bones lose minerals and strength with aging. This can result in serious bone fractures. Your risk for osteoporosis can be identified using a bone density scan.  If you are 65 years of age or older, or if you are at risk for osteoporosis and fractures, ask your health care provider if you should be screened.  Ask your health care provider whether you should take a calcium or vitamin D supplement to lower your risk for osteoporosis.  Menopause may have certain physical symptoms and risks.  Hormone replacement therapy may reduce some of these symptoms and risks.  Talk to your health care provider about whether hormone replacement therapy is right for you.  Follow these instructions at home:  Schedule regular health, dental, and eye exams.  Stay current with your immunizations.  Do not use any tobacco products including cigarettes, chewing tobacco, or electronic cigarettes.  If you are pregnant, do not drink alcohol.  If you are breastfeeding, limit how much and how often you drink alcohol.  Limit alcohol intake to no more than 1 drink per day for nonpregnant women. One drink equals 12 ounces of beer, 5 ounces of wine, or 1½ ounces of hard liquor.  Do not use street drugs.  Do not share needles.  Ask your health care provider for help if you need support or information about quitting drugs.  Tell your health care provider if you often feel depressed.  Tell your health care provider if you have ever been abused or do  not feel safe at home.  This information is not intended to replace advice given to you by your health care provider. Make sure you discuss any questions you have with your health care provider.  Document Released: 07/02/2012 Document Revised: 05/25/2017 Document Reviewed: 09/20/2016  [employer] Interactive Patient Education © 2018 [employer] Healthy Eating  Following a healthy eating pattern may help you to achieve and maintain a healthy body weight, reduce the risk of chronic disease, and live a long and productive life. It is important to follow a healthy eating pattern at an appropriate calorie level for your body. Your nutritional needs should be met primarily through food by choosing a variety of nutrient-rich foods.  What are tips for following this plan?  Reading food labels  Read labels and choose the following:  Reduced or low sodium.  Juices with 100% fruit juice.  Foods with low saturated fats and high polyunsaturated and monounsaturated fats.  Foods with whole grains, such as whole wheat, cracked wheat, brown rice, and wild rice.  Whole grains that are fortified with folic acid. This is recommended for women who are pregnant or who want to become pregnant.  Read labels and avoid the following:  Foods with a lot of added sugars. These include foods that contain brown sugar, corn sweetener, corn syrup, dextrose, fructose, glucose, high-fructose corn syrup, honey, invert sugar, lactose, malt syrup, maltose, molasses, raw sugar, sucrose, trehalose, or turbinado sugar.  Do not eat more than the following amounts of added sugar per day:  6 teaspoons (25 g) for women.  9 teaspoons (38 g) for men.  Foods that contain processed or refined starches and grains.  Refined grain products, such as white flour, degermed cornmeal, white bread, and white rice.  Shopping  Choose nutrient-rich snacks, such as vegetables, whole fruits, and nuts. Avoid high-calorie and high-sugar snacks, such as potato chips, fruit snacks,  "and candy.  Use oil-based dressings and spreads on foods instead of solid fats such as butter, stick margarine, or cream cheese.  Limit pre-made sauces, mixes, and \"instant\" products such as flavored rice, instant noodles, and ready-made pasta.  Try more plant-protein sources, such as tofu, tempeh, black beans, edamame, lentils, nuts, and seeds.  Explore eating plans such as the Mediterranean diet or vegetarian diet.  Cooking  Use oil to sauté or stir-patel foods instead of solid fats such as butter, stick margarine, or lard.  Try baking, boiling, grilling, or broiling instead of frying.  Remove the fatty part of meats before cooking.  Steam vegetables in water or broth.  Meal planning    At meals, imagine dividing your plate into fourths:  One-half of your plate is fruits and vegetables.  One-fourth of your plate is whole grains.  One-fourth of your plate is protein, especially lean meats, poultry, eggs, tofu, beans, or nuts.  Include low-fat dairy as part of your daily diet.     Lifestyle  Choose healthy options in all settings, including home, work, school, restaurants, or stores.  Prepare your food safely:  Wash your hands after handling raw meats.  Keep food preparation surfaces clean by regularly washing with hot, soapy water.  Keep raw meats separate from ready-to-eat foods, such as fruits and vegetables.  , meat, poultry, and eggs to the recommended internal temperature.  Store foods at safe temperatures. In general:  Keep cold foods at 40°F (4.4°C) or below.  Keep hot foods at 140°F (60°C) or above.  Keep your freezer at 0°F (-17.8°C) or below.  Foods are no longer safe to eat when they have been between the temperatures of 40°-140°F (4.4-60°C) for more than 2 hours.  What foods should I eat?  Fruits  Aim to eat 2 cup-equivalents of fresh, canned (in natural juice), or frozen fruits each day. Examples of 1 cup-equivalent of fruit include 1 small apple, 8 large strawberries, 1 cup canned fruit, ½ " cup dried fruit, or 1 cup 100% juice.  Vegetables  Aim to eat 2½-3 cup-equivalents of fresh and frozen vegetables each day, including different varieties and colors. Examples of 1 cup-equivalent of vegetables include 2 medium carrots, 2 cups raw, leafy greens, 1 cup chopped vegetable (raw or cooked), or 1 medium baked potato.  Grains  Aim to eat 6 ounce-equivalents of whole grains each day. Examples of 1 ounce-equivalent of grains include 1 slice of bread, 1 cup ready-to-eat cereal, 3 cups popcorn, or ½ cup cooked rice, pasta, or cereal.  Meats and other proteins  Aim to eat 5-6 ounce-equivalents of protein each day. Examples of 1 ounce-equivalent of protein include 1 egg, 1/2 cup nuts or seeds, or 1 tablespoon (16 g) peanut butter. A cut of meat or fish that is the size of a deck of cards is about 3-4 ounce-equivalents.  Of the protein you eat each week, try to have at least 8 ounces come from seafood. This includes salmon, trout, herring, and anchovies.  Dairy  Aim to eat 3 cup-equivalents of fat-free or low-fat dairy each day. Examples of 1 cup-equivalent of dairy include 1 cup (240 mL) milk, 8 ounces (250 g) yogurt, 1½ ounces (44 g) natural cheese, or 1 cup (240 mL) fortified soy milk.  Fats and oils  Aim for about 5 teaspoons (21 g) per day. Choose monounsaturated fats, such as canola and olive oils, avocados, peanut butter, and most nuts, or polyunsaturated fats, such as sunflower, corn, and soybean oils, walnuts, pine nuts, sesame seeds, sunflower seeds, and flaxseed.  Beverages  Aim for six 8-oz glasses of water per day. Limit coffee to three to five 8-oz cups per day.  Limit caffeinated beverages that have added calories, such as soda and energy drinks.  Limit alcohol intake to no more than 1 drink a day for nonpregnant women and 2 drinks a day for men. One drink equals 12 oz of beer (355 mL), 5 oz of wine (148 mL), or 1½ oz of hard liquor (44 mL).  Seasoning and other foods  Avoid adding excess amounts of  salt to your foods. Try flavoring foods with herbs and spices instead of salt.  Avoid adding sugar to foods.  Try using oil-based dressings, sauces, and spreads instead of solid fats.  This information is based on general [place] nutrition guidelines. For more information, visit choosemyplate.gov. Exact amounts may vary based on your nutrition needs.  Summary  A healthy eating plan may help you to maintain a healthy weight, reduce the risk of chronic diseases, and stay active throughout your life.  Plan your meals. Make sure you eat the right portions of a variety of nutrient-rich foods.  Try baking, boiling, grilling, or broiling instead of frying.  Choose healthy options in all settings, including home, work, school, restaurants, or stores.  This information is not intended to replace advice given to you by your health care provider. Make sure you discuss any questions you have with your health care provider.  Document Revised: 04/01/2019 Document Reviewed: 04/01/2019  [employer] Patient Education © 2021 [employer]    Exercising to Stay Healthy  To become healthy and stay healthy, it is recommended that you do moderate-intensity and vigorous-intensity exercise. You can tell that you are exercising at a moderate intensity if your heart starts beating faster and you start breathing faster but can still hold a conversation. You can tell that you are exercising at a vigorous intensity if you are breathing much harder and faster and cannot hold a conversation while exercising.  Exercising regularly is important. It has many health benefits, such as:  Improving overall fitness, flexibility, and endurance.  Increasing bone density.  Helping with weight control.  Decreasing body fat.  Increasing muscle strength.  Reducing stress and tension.  Improving overall health.  How often should I exercise?  Choose an activity that you enjoy, and set realistic goals. Your health care provider can help you make an activity plan that  works for you.  Exercise regularly as told by your health care provider. This may include:  Doing strength training two times a week, such as:  Lifting weights.  Using resistance bands.  Push-ups.  Sit-ups.  Yoga.  Doing a certain intensity of exercise for a given amount of time. Choose from these options:  A total of 150 minutes of moderate-intensity exercise every week.  A total of 75 minutes of vigorous-intensity exercise every week.  A mix of moderate-intensity and vigorous-intensity exercise every week.  Children, pregnant women, people who have not exercised regularly, people who are overweight, and older adults may need to talk with a health care provider about what activities are safe to do. If you have a medical condition, be sure to talk with your health care provider before you start a new exercise program.  What are some exercise ideas?    Moderate-intensity exercise ideas include:  Walking 1 mile (1.6 km) in about 15 minutes.  Biking.  Hiking.  Golfing.  Dancing.  Water aerobics.  Vigorous-intensity exercise ideas include:  Walking 4.5 miles (7.2 km) or more in about 1 hour.  Jogging or running 5 miles (8 km) in about 1 hour.  Biking 10 miles (16.1 km) or more in about 1 hour.  Lap swimming.  Roller-skating or in-line skating.  Cross-country skiing.  Vigorous competitive sports, such as football, basketball, and soccer.  Jumping rope.  Aerobic dancing.  What are some everyday activities that can help me to get exercise?  Yard work, such as:  Pushing a .  Raking and bagging leaves.  Washing your car.  Pushing a stroller.  Shoveling snow.  Gardening.  Washing windows or floors.  How can I be more active in my day-to-day activities?  Use stairs instead of an elevator.  Take a walk during your lunch break.  If you drive, park your car farther away from your work or school.  If you take public transportation, get off one stop early and walk the rest of the way.  Stand up or walk around during all  of your indoor phone calls.  Get up, stretch, and walk around every 30 minutes throughout the day.  Enjoy exercise with a friend. Support to continue exercising will help you keep a regular routine of activity.  What guidelines can I follow while exercising?  Before you start a new exercise program, talk with your health care provider.  Do not exercise so much that you hurt yourself, feel dizzy, or get very short of breath.  Wear comfortable clothes and wear shoes with good support.  Drink plenty of water while you exercise to prevent dehydration or heat stroke.  Work out until your breathing and your heartbeat get faster.  Where to find more information  [place] Department of Health and Human Services: www.hhs.gov  Centers for Disease Control and Prevention (CDC): www.cdc.gov  Summary  Exercising regularly is important. It will improve your overall fitness, flexibility, and endurance.  Regular exercise also will improve your overall health. It can help you control your weight, reduce stress, and improve your bone density.  Do not exercise so much that you hurt yourself, feel dizzy, or get very short of breath.  Before you start a new exercise program, talk with your health care provider.  This information is not intended to replace advice given to you by your health care provider. Make sure you discuss any questions you have with your health care provider.  Document Revised: 11/30/2018 Document Reviewed: 11/08/2018  [employer] Patient Education © 2021 [employer]        Healthy Delicious Recipes from [employer]: https://eParachute.org/  Citizen of [place] Plant Based Meal Recipes: https://Mercantila.Oja.la/  Heart Healthy Recipes from [employer]: https://abcardio.org/wp-content/uploads/2020/06/ABC_Cookbook.pdf  [employer] Healthy Living Guide: https://[email or web address].[employer].edu/nutritionsource/2022/01/06/healthy-living-guide-3411-7889/  SNAP Cookbook for Budgets:  http://ongov.net/dss/documents/good-and-cheap.pdf

## 2024-08-30 NOTE — ASSESSMENT & PLAN NOTE
"Chronic improving. PHQ9 elevated, but denies any active SI or HI and reports she is doing \"pretty good\". Counseled on seeking ER care, calling 911 etc if any intrusive thoughts. Continue current medications and f/u with psychiatry.   "

## 2024-08-30 NOTE — ASSESSMENT & PLAN NOTE
Chronic, improving. Following with [facility]. Continue naltrexone as ordered. Continue follow up with functional medicine.

## 2024-08-31 LAB
ALBUMIN SERPL-MCNC: 4 G/DL (ref 3.5–5.2)
ALBUMIN/GLOB SERPL: 1.5 G/DL
ALP SERPL-CCNC: 69 U/L (ref 39–117)
ALT SERPL W P-5'-P-CCNC: 10 U/L (ref 1–33)
ANION GAP SERPL CALCULATED.3IONS-SCNC: 10.4 MMOL/L (ref 5–15)
AST SERPL-CCNC: 14 U/L (ref 1–32)
BASOPHILS # BLD AUTO: 0.02 10*3/MM3 (ref 0–0.2)
BASOPHILS NFR BLD AUTO: 0.3 % (ref 0–1.5)
BILIRUB SERPL-MCNC: <0.2 MG/DL (ref 0–1.2)
BUN SERPL-MCNC: 10 MG/DL (ref 6–20)
BUN/CREAT SERPL: 15.2 (ref 7–25)
CALCIUM SPEC-SCNC: 10.6 MG/DL (ref 8.6–10.5)
CHLORIDE SERPL-SCNC: 105 MMOL/L (ref 98–107)
CHOLEST SERPL-MCNC: 191 MG/DL (ref 0–200)
CO2 SERPL-SCNC: 22.6 MMOL/L (ref 22–29)
CREAT SERPL-MCNC: 0.66 MG/DL (ref 0.57–1)
DEPRECATED RDW RBC AUTO: 40.8 FL (ref 37–54)
EGFRCR SERPLBLD CKD-EPI 2021: 109 ML/MIN/1.73
EOSINOPHIL # BLD AUTO: 0.21 10*3/MM3 (ref 0–0.4)
EOSINOPHIL NFR BLD AUTO: 3.5 % (ref 0.3–6.2)
ERYTHROCYTE [DISTWIDTH] IN BLOOD BY AUTOMATED COUNT: 12.4 % (ref 12.3–15.4)
GLOBULIN UR ELPH-MCNC: 2.7 GM/DL
GLUCOSE SERPL-MCNC: 98 MG/DL (ref 65–99)
HCT VFR BLD AUTO: 38.8 % (ref 34–46.6)
HDLC SERPL-MCNC: 32 MG/DL (ref 40–60)
HGB BLD-MCNC: 12.9 G/DL (ref 12–15.9)
IMM GRANULOCYTES # BLD AUTO: 0.04 10*3/MM3 (ref 0–0.05)
IMM GRANULOCYTES NFR BLD AUTO: 0.7 % (ref 0–0.5)
LDLC SERPL CALC-MCNC: 120 MG/DL (ref 0–100)
LDLC/HDLC SERPL: 3.59 {RATIO}
LYMPHOCYTES # BLD AUTO: 1.97 10*3/MM3 (ref 0.7–3.1)
LYMPHOCYTES NFR BLD AUTO: 32.9 % (ref 19.6–45.3)
MCH RBC QN AUTO: 30.2 PG (ref 26.6–33)
MCHC RBC AUTO-ENTMCNC: 33.2 G/DL (ref 31.5–35.7)
MCV RBC AUTO: 90.9 FL (ref 79–97)
MONOCYTES # BLD AUTO: 0.59 10*3/MM3 (ref 0.1–0.9)
MONOCYTES NFR BLD AUTO: 9.8 % (ref 5–12)
NEUTROPHILS NFR BLD AUTO: 3.16 10*3/MM3 (ref 1.7–7)
NEUTROPHILS NFR BLD AUTO: 52.8 % (ref 42.7–76)
NRBC BLD AUTO-RTO: 0 /100 WBC (ref 0–0.2)
PLATELET # BLD AUTO: 239 10*3/MM3 (ref 140–450)
PMV BLD AUTO: 10.6 FL (ref 6–12)
POTASSIUM SERPL-SCNC: 4.2 MMOL/L (ref 3.5–5.2)
PROT SERPL-MCNC: 6.7 G/DL (ref 6–8.5)
RBC # BLD AUTO: 4.27 10*6/MM3 (ref 3.77–5.28)
SODIUM SERPL-SCNC: 138 MMOL/L (ref 136–145)
TRIGL SERPL-MCNC: 221 MG/DL (ref 0–150)
TSH SERPL DL<=0.05 MIU/L-ACNC: 2.35 UIU/ML (ref 0.27–4.2)
VLDLC SERPL-MCNC: 39 MG/DL (ref 5–40)
WBC NRBC COR # BLD AUTO: 5.99 10*3/MM3 (ref 3.4–10.8)

## 2024-09-06 ENCOUNTER — HOSPITAL ENCOUNTER (OUTPATIENT)
Facility: HOSPITAL | Age: 49
Discharge: HOME | End: 2024-09-06
Admitting: SURGERY
Payer: COMMERCIAL

## 2024-09-06 DIAGNOSIS — R92.8 ABNORMAL MAMMOGRAM: ICD-10-CM

## 2024-09-06 PROCEDURE — 77066 DX MAMMO INCL CAD BI: CPT

## 2024-09-06 PROCEDURE — G0279 TOMOSYNTHESIS, MAMMO: HCPCS

## 2025-02-20 DIAGNOSIS — R09.81 NASAL CONGESTION: ICD-10-CM

## 2025-02-20 RX ORDER — CHLORCYCLIZINE HYDROCHLORIDE AND PSEUDOEPHEDRINE HYDROCHLORIDE 25; 60 MG/1; MG/1
1 TABLET ORAL NIGHTLY PRN
Qty: 30 TABLET | Refills: 3 | Status: SHIPPED | OUTPATIENT
Start: 2025-02-20

## 2025-02-20 NOTE — TELEPHONE ENCOUNTER
Caller: Lucille Mejía    Relationship: Self    Best call back number: [phone]     Requested Prescriptions:   Requested Prescriptions     Pending Prescriptions Disp Refills    Chlorcyclizine-Pseudoephed (Stahist AD) 25-60 MG tablet 30 tablet 3     Sig: Take 1 tablet by mouth At Night As Needed (nasal congestion).        Pharmacy where request should be sent: [address] [phone] [facility] [phone]      Last office visit with prescribing clinician: 8/30/2024   Last telemedicine visit with prescribing clinician: Visit date not found   Next office visit with prescribing clinician: 4/1/2025     Additional details provided by patient: PATIENT HAS CALLED REQUESTING A NEW PRESCRIPTION ON ABOVE MEDICATION.     Does the patient have less than a 3 day supply:  [x] Yes  [] No    Would you like a call back once the refill request has been completed: [] Yes [x] No    If the office needs to give you a call back, can they leave a voicemail: [] Yes [x] No    Leonel Cleary   02/20/25 15:25 EST

## 2025-04-01 ENCOUNTER — OFFICE VISIT (OUTPATIENT)
Dept: INTERNAL MEDICINE | Facility: CLINIC | Age: 50
End: 2025-04-01
Payer: COMMERCIAL

## 2025-04-01 VITALS
OXYGEN SATURATION: 97 % | HEART RATE: 88 BPM | RESPIRATION RATE: 20 BRPM | BODY MASS INDEX: 32.15 KG/M2 | SYSTOLIC BLOOD PRESSURE: 124 MMHG | TEMPERATURE: 97.5 F | DIASTOLIC BLOOD PRESSURE: 70 MMHG | WEIGHT: 212.13 LBS

## 2025-04-01 DIAGNOSIS — J30.9 ALLERGIC RHINITIS, UNSPECIFIED SEASONALITY, UNSPECIFIED TRIGGER: ICD-10-CM

## 2025-04-01 DIAGNOSIS — G43.009 MIGRAINE WITHOUT AURA AND WITHOUT STATUS MIGRAINOSUS, NOT INTRACTABLE: Chronic | ICD-10-CM

## 2025-04-01 DIAGNOSIS — G47.33 MODERATE OBSTRUCTIVE SLEEP APNEA: ICD-10-CM

## 2025-04-01 DIAGNOSIS — G93.32 MYALGIC ENCEPHALOMYELITIS/CHRONIC FATIGUE SYNDROME (ME/CFS): ICD-10-CM

## 2025-04-01 DIAGNOSIS — E03.8 SUBCLINICAL HYPOTHYROIDISM: Primary | ICD-10-CM

## 2025-04-01 DIAGNOSIS — D89.40 MAST CELL ACTIVATION SYNDROME: ICD-10-CM

## 2025-04-01 DIAGNOSIS — N64.4 BREAST PAIN, LEFT: ICD-10-CM

## 2025-04-01 NOTE — ASSESSMENT & PLAN NOTE
Chronic, worsening.  Continue Stahist as needed.  Recommend resuming Flonase, 2 sprays in each nostril once daily.  Counseled on appropriate administration.  Offered prescription but patient states she has this at home.

## 2025-04-01 NOTE — ASSESSMENT & PLAN NOTE
Chronic, stable.  Notes slight improvement with treatment of JAVI.  No longer taking levothyroxine.  Reviewed recent TSH, free T4 and free T3 dated 3/19/2025, all normal.  Agree with discontinuing levothyroxine.  Recommend exercise as tolerated.  Continue follow-up with psychiatry for mood management.  Continue CPAP

## 2025-04-01 NOTE — PROGRESS NOTES
Follow Up Office Visit      Date: 2025   Patient Name: Lucille Mejía  : 1975   MRN: [id number]     Chief Complaint:    Chief Complaint   Patient presents with    Hyperlipidemia    Fatigue     Follow up        History of Present Illness: Lucille Mejía is a 49 y.o. female  with history of treatment resistant depression, bipolar 1, chronic fatigue, subclinical hypothyroidism  who is here today to follow up with the following problems.    HPI  History of Present Illness  The patient presents for evaluation of migraines, breast incision pain, and allergies.    She has been experiencing an increase in the frequency of her migraines, which she attributes to potential spring allergies. She is currently on Imitrex, prescribed by her neurologist, Dr. Singh, whom she has not consulted recently. Her sister was previously provided with Nurtec samples and is considering alternating between Imitrex and Nurtec, as suggested by her sister who also suffers from migraines. She plans to schedule an appointment with Dr. Singh in the near future. She recalls trying another medication a few years ago, but it induced nausea. Imitrex has been effective in managing her migraines, but she expresses concern about potential dependency, as her father experienced rebound headaches every 36 hours due to frequent use.    She reports persistent pain at the site of a previous breast incision. She has been applying vitamin E lotion or cream as recommended by her surgeon, Dr. Lyles, which provides some relief. However, the pain remains. She had a follow-up consultation with Dr. Lyles, who advised her to return if she experienced any issues. He also mentioned the possibility of a cortisone injection if the discomfort persists.    She continues to take Stahist for her allergies. She tested positive for MARCoNS and was prescribed a nasal spray (BEG spray??), which she believes has slightly improved her  "condition. She uses a CPAP machine, which has generally improved  her headaches, but she notes that it exacerbates her symptoms when her nasal passages are congested. She performs a sinus rinse in the morning and has previously used Flonase, but not recently.    She has been engaging in mild physical activity, specifically walking.    FAMILY HISTORY  Her sister also gets migraines and alternates between Imitrex and Nurtec.       Chronic fatigue  She has been consulting with Dr. Early at [facility]'s Functional Medicine Department, I personally reviewed note dated 3/19/25  - doing accupuncture  - taking \"Quicksilver binder\" once weekly, taking NAC  - ordered G6PD level for evaluation prior to considering methylene blue for \"mitochondrial support\"  - performed OMT for somatic dysfuntctions.     Hypothyroidism    JAVI  - on CPAP following with [facility], last saw 2/5/25, I personally reviewed this note.         Subjective      Review of Systems:   Review of Systems    I have reviewed the patients family history, social history, past medical history, past surgical history and have updated it as appropriate.     Medications:     Current Outpatient Medications:     5-Hydroxytryptophan (5-HTP PO), Take  by mouth. 2 tablets after lunch and 1 after dinner, Disp: , Rfl:     ALPRAZolam (XANAX) 0.5 MG tablet, Take 1 tablet by mouth 3 (Three) Times a Day As Needed., Disp: , Rfl:     ascorbic acid (VITAMIN C) 1000 MG tablet, Take 1 tablet by mouth Daily., Disp: , Rfl:     Chlorcyclizine-Pseudoephed (Stahist AD) 25-60 MG tablet, Take 1 tablet by mouth At Night As Needed (nasal congestion)., Disp: 30 tablet, Rfl: 3    diphenhydrAMINE (BENADRYL) 25 MG tablet, Take 1 tablet by mouth Every 6 (Six) Hours As Needed for Itching., Disp: , Rfl:     divalproex (DEPAKOTE ER) 500 MG 24 hr tablet, Take 2 tablets by mouth Daily., Disp: 180 tablet, Rfl: 3    escitalopram (LEXAPRO) 20 MG tablet, Take  by mouth., Disp: , Rfl: "     famotidine (PEPCID) 10 MG tablet, Take 1 tablet by mouth 2 (Two) Times a Day As Needed for Heartburn., Disp: , Rfl:     FEVERFEW PO, Take 1 tablet by mouth 3 (Three) Times a Day With Meals., Disp: , Rfl:     levothyroxine (SYNTHROID, LEVOTHROID) 25 MCG tablet, Take 1 tablet by mouth Every Morning., Disp: 90 tablet, Rfl: 1    Lurasidone HCl (LATUDA) 20 MG tablet tablet, TAKE 1 TABLET BY MOUTH ONCE DAILY IN THE EVENING WITH FOOD, Disp: , Rfl:     melatonin 1 MG tablet, Take 0.5 tablets by mouth Every Night., Disp: , Rfl:     Naltrexone HCl, Pain, 1.5 MG capsule, Take 3 mg by mouth Daily., Disp: , Rfl:     SUMAtriptan (IMITREX) 100 MG tablet, Take one tablet at onset of headache. May repeat dose one time in 2 hours if headache not relieved., Disp: 36 tablet, Rfl: 3    Vitamin D-Vitamin K (K2-D3 5000 PO), Take 2 tablets by mouth Daily., Disp: , Rfl:     Allergies:   Allergies   Allergen Reactions    Tomato Unknown - Low Severity    Amoxicillin-Pot Clavulanate GI Intolerance    Dexamethasone Mental Status Change     Manic episodes    Gluten Meal Rash    Histamine Phosphate Diarrhea, Itching and Other (See Comments)     Allergy type reactions, reflux.    Sulfa Antibiotics Hives       Objective     Physical Exam: Please see above  Vital Signs:   Vitals:    04/01/25 1459   BP: 124/70   BP Location: Left arm   Patient Position: Sitting   Cuff Size: Adult   Pulse: 88   Resp: 20   Temp: 97.5 °F (36.4 °C)   TempSrc: Temporal   SpO2: 97%   Weight: 96.2 kg (212 lb 2 oz)   PainSc: 0-No pain     Body mass index is 32.15 kg/m².          Physical Exam  Vitals reviewed. Exam conducted with a chaperone present (AKUA Vega).   Constitutional:       General: She is not in acute distress.     Appearance: Normal appearance. She is obese. She is not ill-appearing or toxic-appearing.      Comments: Appears to have more energy/more refreshed today     HENT:      Head: Normocephalic and atraumatic.      Right Ear: External ear  normal.      Left Ear: External ear normal.      Nose: Nose normal. Congestion present.      Mouth/Throat:      Mouth: Mucous membranes are moist.   Eyes:      General: No scleral icterus.     Extraocular Movements: Extraocular movements intact.   Cardiovascular:      Rate and Rhythm: Normal rate and regular rhythm.      Pulses: Normal pulses.      Heart sounds: No murmur heard.     No friction rub. No gallop.   Pulmonary:      Effort: Pulmonary effort is normal. No respiratory distress.      Breath sounds: Normal breath sounds. No stridor. No wheezing or rales.   Chest:          Comments: Well healed linear incision over lateral left breast without erythema warmth discharge or fluctuance. No appreciable mass lesion. Possible slight scar hypertrophy  Abdominal:      General: Abdomen is flat. There is no distension.   Skin:     General: Skin is warm and dry.      Capillary Refill: Capillary refill takes less than 2 seconds.   Neurological:      General: No focal deficit present.      Mental Status: She is alert and oriented to person, place, and time.   Psychiatric:         Behavior: Behavior normal.         Judgment: Judgment normal.      Comments: Improved affect       Physical Exam        Procedures    Results:   Labs:   Hemoglobin A1C   Date Value Ref Range Status   08/30/2024 5.20 4.80 - 5.60 % Final     TSH   Date Value Ref Range Status   08/30/2024 2.350 0.270 - 4.200 uIU/mL Final      Results  Laboratory Studies  Thyroid levels are normal. Iron levels are normal. Vitamin D level is slightly high. Magnesium levels are normal. B6 levels are normal.      Imaging:   No valid procedures specified.     Assessment / Plan      Assessment/Plan:   Problem List Items Addressed This Visit       Fatigue    Current Assessment & Plan   Chronic, stable.  Notes slight improvement with treatment of JAVI.  No longer taking levothyroxine.  Reviewed recent TSH, free T4 and free T3 dated 3/19/2025, all normal.  Agree with  discontinuing levothyroxine.  Recommend exercise as tolerated.  Continue follow-up with psychiatry for mood management.  Continue CPAP         Allergic rhinitis    Current Assessment & Plan   Chronic, worsening.  Continue Stahist as needed.  Recommend resuming Flonase, 2 sprays in each nostril once daily.  Counseled on appropriate administration.  Offered prescription but patient states she has this at home.         Mast cell activation syndrome    Current Assessment & Plan   Chronic, improving. Following with [facility]. Continue naltrexone as ordered. Continue follow up with functional medicine.  Viewed recent labs dated 3/19/2025, all grossly normal other than slightly elevated vitamin D.         Migraine without aura and without status migrainosus, not intractable (Chronic)    Overview   Started age 36. Worsened over past 3 years. Triggers thought to be stress and allergies. Takes Benadryl regularly for mast cell activation syndrome (following with [facility])         Current Assessment & Plan   Chronic, worsening.  Patient feels she is having increased headaches due to poorly controlled allergic rhinitis.  Will optimize allergic rhinitis treatment as above.  Continue Depakote 1000 mg daily as prescribed by neurology.  Patient reports that Imitrex seems to help but is not as effective as it previously was.  Previously failed rizatriptan.  Provided with samples for Nurtec ODT, 75 mg once daily.  Prescription sent.    Sample information  Provided 4 pills from Lot number [id number] expiration 7/2027  Provided 2 pills from Lot #[id number] expiration 11/27.               Relevant Medications    rimegepant sulfate ODT (NURTEC-ODT) 75 MG disintegrating tablet    Moderate obstructive sleep apnea     Other Visit Diagnoses         Subclinical hypothyroidism    -  Primary      Breast pain, left        Scar well-healed without any signs of infection.  No palpable abnormalities other than prominent granulation tissue.   Recommend  w/breast surgeon Dr. Lyles            Assessment & Plan  1. Migraines.  She reports increased migraines and currently uses Imitrex, which helps but causes concern for rebound headaches. A prescription for Nurtec 25 mg as needed for migraines has been issued. She is advised not to combine Nurtec with Imitrex and to use Nurtec as a replacement for Imitrex. Samples of Nurtec have been provided.    2. Breast incision pain.  She experiences occasional pain at the site of a previous breast surgery incision. Examination reveals fibrous tissue buildup and scarring, but nothing concerning. She has been using vitamin E lotion, which provides some relief. If the pain worsens, she is advised to consult Dr. Lyles for a potential cortisone injection.     Chronic fatigue syndrome.  She reports ongoing fatigue. Labs show normal iron, thyroid, magnesium, and B6 levels, with slightly elevated vitamin D. She is encouraged to engage in mild physical activity, such as walking, without overexerting herself to avoid fatigue crashes.       Follow Up:   Return if symptoms worsen or fail to improve.    I spent 46 minutes caring for Lucille on this date of service. This time includes time spent by me in the following activities: preparing for the visit, reviewing tests, performing a medically appropriate examination and/or evaluation, counseling and educating the patient/family/caregiver, documenting information in the medical record, independently interpreting results and communicating that information with the patient/family/caregiver, and ordering medications        Irineo Hernandez MD   [facility]    Patient or patient representative verbalized consent for the use of Ambient Listening during the visit with  Irineo Hernanedz MD for chart documentation. 4/1/2025  15:42 EDT

## 2025-04-01 NOTE — ASSESSMENT & PLAN NOTE
Chronic, improving. Following with [facility]. Continue naltrexone as ordered. Continue follow up with functional medicine.  Viewed recent labs dated 3/19/2025, all grossly normal other than slightly elevated vitamin D.

## 2025-04-01 NOTE — ASSESSMENT & PLAN NOTE
Chronic, worsening.  Patient feels she is having increased headaches due to poorly controlled allergic rhinitis.  Will optimize allergic rhinitis treatment as above.  Continue Depakote 1000 mg daily as prescribed by neurology.  Patient reports that Imitrex seems to help but is not as effective as it previously was.  Previously failed rizatriptan.  Provided with samples for Nurtec ODT, 75 mg once daily.  Prescription sent.    Sample information  Provided 4 pills from Lot number [id number] expiration 7/2027  Provided 2 pills from Lot #[id number] expiration 11/27.

## 2025-04-09 ENCOUNTER — TELEPHONE (OUTPATIENT)
Dept: INTERNAL MEDICINE | Facility: CLINIC | Age: 50
End: 2025-04-09
Payer: COMMERCIAL